# Patient Record
Sex: FEMALE | Race: WHITE | Employment: FULL TIME | URBAN - METROPOLITAN AREA
[De-identification: names, ages, dates, MRNs, and addresses within clinical notes are randomized per-mention and may not be internally consistent; named-entity substitution may affect disease eponyms.]

---

## 2017-01-12 DIAGNOSIS — R09.89 LABILE HYPERTENSION: Primary | ICD-10-CM

## 2017-01-12 DIAGNOSIS — E03.9 HYPOTHYROIDISM, UNSPECIFIED TYPE: ICD-10-CM

## 2017-01-12 RX ORDER — LISINOPRIL 10 MG/1
10 TABLET ORAL 2 TIMES DAILY
Qty: 180 TABLET | Refills: 0 | Status: SHIPPED | OUTPATIENT
Start: 2017-01-12 | End: 2017-01-13

## 2017-01-12 RX ORDER — LEVOTHYROXINE SODIUM 0.2 MG/1
200 TABLET ORAL
Qty: 90 TABLET | Refills: 0 | Status: SHIPPED | OUTPATIENT
Start: 2017-01-12 | End: 2017-01-13

## 2017-01-13 DIAGNOSIS — R09.89 LABILE HYPERTENSION: Primary | ICD-10-CM

## 2017-01-13 DIAGNOSIS — E03.9 HYPOTHYROIDISM, UNSPECIFIED TYPE: ICD-10-CM

## 2017-01-13 NOTE — TELEPHONE ENCOUNTER
Fax request received from [address] [place] [address] requesting refills on Lisinopril 10mg & Levothyroxine 200mcg. These were just filled 1-12-17 to [facility] in [place]. Phone call to [facility]. Patient has not picked up. Cancelled Rx's.   Last office visi

## 2017-01-16 RX ORDER — LISINOPRIL 10 MG/1
10 TABLET ORAL 2 TIMES DAILY
Qty: 180 TABLET | Refills: 0 | OUTPATIENT
Start: 2017-01-16 | End: 2017-12-02

## 2017-01-16 RX ORDER — LEVOTHYROXINE SODIUM 0.2 MG/1
200 TABLET ORAL
Qty: 90 TABLET | Refills: 0 | OUTPATIENT
Start: 2017-01-16 | End: 2017-12-02

## 2017-01-16 NOTE — TELEPHONE ENCOUNTER
Pharmacist calls inquiring about status of refills. Advised we have been trying to get ahold of patient. She is overdue for fasting labs. Advised can authorize #30 until labs are done.   States her insurance will not cover 30, only 90 because these are m

## 2017-01-19 ENCOUNTER — HOSPITAL ENCOUNTER (OUTPATIENT)
Age: 57
Discharge: HOME | End: 2017-01-19
Attending: FAMILY MEDICINE
Payer: COMMERCIAL

## 2017-01-19 ENCOUNTER — TELEPHONE (OUTPATIENT)
Dept: FAMILY MEDICINE CLINIC | Facility: CLINIC | Age: 57
End: 2017-01-19

## 2017-01-19 ENCOUNTER — APPOINTMENT (OUTPATIENT)
Dept: CT IMAGING | Age: 57
End: 2017-01-19
Attending: FAMILY MEDICINE
Payer: COMMERCIAL

## 2017-01-19 VITALS
DIASTOLIC BLOOD PRESSURE: 75 MMHG | WEIGHT: 239 LBS | HEART RATE: 78 BPM | SYSTOLIC BLOOD PRESSURE: 124 MMHG | BODY MASS INDEX: 36.22 KG/M2 | OXYGEN SATURATION: 95 % | HEIGHT: 68 IN | TEMPERATURE: 99 F | RESPIRATION RATE: 16 BRPM

## 2017-01-19 DIAGNOSIS — R10.9 ABDOMINAL PAIN, ACUTE: Primary | ICD-10-CM

## 2017-01-19 DIAGNOSIS — R19.7 DIARRHEA, UNSPECIFIED TYPE: Primary | ICD-10-CM

## 2017-01-19 DIAGNOSIS — R11.0 NAUSEA: ICD-10-CM

## 2017-01-19 DIAGNOSIS — R19.7 DIARRHEA, UNSPECIFIED TYPE: ICD-10-CM

## 2017-01-19 DIAGNOSIS — R50.9 FEVER, UNSPECIFIED FEVER CAUSE: ICD-10-CM

## 2017-01-19 LAB
#LYMPHOCYTE IC: 1.1 X10ˆ3/UL (ref 0.9–3.2)
#MXD IC: 0.7 X10ˆ3/UL (ref 0.1–1)
#NEUTROPHIL IC: 2 X10ˆ3/UL (ref 1.3–6.7)
HCT IC: 43.3 % (ref 37–54)
HGB IC: 14.5 G/DL (ref 11.7–16)
MCH IC: 28 PG (ref 27–33.2)
MCHC IC: 33.5 G/DL (ref 31–37)
MCV IC: 83.6 FL (ref 81–100)
PLT IC: 191 X10ˆ3/UL (ref 150–450)
POCT BILIRUBIN URINE: NEGATIVE
POCT BLOOD URINE: NEGATIVE
POCT GLUCOSE URINE: NEGATIVE MG/DL
POCT KETONE URINE: NEGATIVE MG/DL
POCT NITRITE URINE: NEGATIVE
POCT PH URINE: 5 (ref 5–8)
POCT PROTEIN URINE: NEGATIVE MG/DL
POCT SPECIFIC GRAVITY URINE: 1.03
POCT URINE CLARITY: CLEAR
POCT URINE COLOR: YELLOW
POCT UROBILINOGEN URINE: 0.2 MG/DL
RBC IC: 5.18 X10ˆ6/UL (ref 3.8–5.1)
WBC IC: 3.8 X10ˆ3/UL (ref 4–13)

## 2017-01-19 PROCEDURE — 87086 URINE CULTURE/COLONY COUNT: CPT | Performed by: FAMILY MEDICINE

## 2017-01-19 PROCEDURE — 96374 THER/PROPH/DIAG INJ IV PUSH: CPT

## 2017-01-19 PROCEDURE — 81002 URINALYSIS NONAUTO W/O SCOPE: CPT | Performed by: FAMILY MEDICINE

## 2017-01-19 PROCEDURE — 85025 COMPLETE CBC W/AUTO DIFF WBC: CPT | Performed by: FAMILY MEDICINE

## 2017-01-19 PROCEDURE — 74176 CT ABD & PELVIS W/O CONTRAST: CPT

## 2017-01-19 PROCEDURE — 99215 OFFICE O/P EST HI 40 MIN: CPT

## 2017-01-19 PROCEDURE — 96361 HYDRATE IV INFUSION ADD-ON: CPT

## 2017-01-19 PROCEDURE — 99204 OFFICE O/P NEW MOD 45 MIN: CPT

## 2017-01-19 RX ORDER — ONDANSETRON 8 MG/1
8 TABLET, ORALLY DISINTEGRATING ORAL EVERY 12 HOURS PRN
Qty: 10 TABLET | Refills: 0 | Status: SHIPPED | OUTPATIENT
Start: 2017-01-19 | End: 2017-01-29

## 2017-01-19 RX ORDER — METRONIDAZOLE 500 MG/1
500 TABLET ORAL 3 TIMES DAILY
Qty: 30 TABLET | Refills: 0 | Status: SHIPPED | OUTPATIENT
Start: 2017-01-19 | End: 2017-01-29

## 2017-01-19 RX ORDER — CIPROFLOXACIN 500 MG/1
500 TABLET, FILM COATED ORAL 2 TIMES DAILY
Qty: 20 TABLET | Refills: 0 | Status: SHIPPED | OUTPATIENT
Start: 2017-01-19 | End: 2017-01-29

## 2017-01-19 RX ORDER — SODIUM CHLORIDE 9 MG/ML
1000 INJECTION, SOLUTION INTRAVENOUS ONCE
Status: COMPLETED | OUTPATIENT
Start: 2017-01-19 | End: 2017-01-19

## 2017-01-19 RX ORDER — ONDANSETRON 2 MG/ML
4 INJECTION INTRAMUSCULAR; INTRAVENOUS ONCE
Status: COMPLETED | OUTPATIENT
Start: 2017-01-19 | End: 2017-01-19

## 2017-01-19 NOTE — ED INITIAL ASSESSMENT (HPI)
Since Sunday night fever 102, diarrhea, nausea . States not eating or drinking. Diarrhea has slowed down since she is not eating much. [facility] has been caring for her granddaughter who was diagnosed with cdiff after 4 months of antibiotics.  [facility] she has d

## 2017-01-19 NOTE — TELEPHONE ENCOUNTER
Has had diarrhea since Sunday, ran fever as high as 102 on Sun and Mon. This morning drenched in sweat, feels weak. Ate an egg and then had diarrhea again. Her grandaughter tested positive for c-diff, and she has been babysitting her.   She does feel she

## 2017-01-20 NOTE — ED PROVIDER NOTES
Patient Seen in: [address] [facility]    History   Patient presents with:  Diarrhea  Fever  Nausea    Stated Complaint: diarrhea/dehydration    HPI    58-year-old female who presents to the clinic today with chief complaints of fever, chills, ENDOSCOPY,DIAGNOSIS  1995    Comment minimla gastritis [address]    Comment wnl    OTHER SURGICAL HISTORY  10/2007    Comment C2-6 spine fusion w/ plate    UPPER GI ENDOSCOPY,DIAGNOSIS  5/27/08    Comment wnl    COLONOSCOPY,DIAG hours as needed for Wheezing.    Levothyroxine Sodium (SYNTHROID, LEVOTHROID) 25 MCG Oral Tab,  Take 1 tablet (25 mcg total) by mouth before breakfast.   PRILOSEC 40 MG OR CPDR,  1 CAPSULE DAILY       Family History   Problem Relation Age of Onset   • Hyper without murmur  GI: soft, non distended. No visible peristalsis. No masses. No organomegaly. Tenderness in left side of the abdomen, this infraumbilical/suprapubic area,. Umbilical area. Normoactive bowel sounds. No guarding or rigidity.   Normal to per AORTA/VASCULAR:  Normal.  No aneurysm. RETROPERITONEUM:  Normal.  No mass or adenopathy. BOWEL/MESENTERY:  Normal.  No visible mass, obstruction, or bowel wall thickening.  There is no free intraperitoneal air, free fluid or inflammatory changes of the pe intra-abdominal or pelvic process. However a small rounded encapsulated focus of fat necrosis was identified just superior to the bladder. Discussed the case with Dr. Miguel Winchester who is on-call for surgery.   She recommends that I also speak to urology to R-0    Ciprofloxacin HCl 500 MG Oral Tab  Take 1 tablet (500 mg total) by mouth 2 (two) times daily. , Normal, Disp-20 tablet, R-0    ondansetron 8 MG Oral Tablet Dispersible  Take 1 tablet (8 mg total) by mouth every 12 (twelve) hours as needed for Nausea.

## 2017-01-20 NOTE — ED NOTES
Discussed discharge instructions and need for hydration. Given stool packet. Will return stool tomorrow. States she feels better.

## 2017-01-21 ENCOUNTER — LAB ENCOUNTER (OUTPATIENT)
Dept: LAB | Age: 57
End: 2017-01-21
Attending: FAMILY MEDICINE
Payer: COMMERCIAL

## 2017-01-21 DIAGNOSIS — R19.7 DIARRHEA, UNSPECIFIED TYPE: ICD-10-CM

## 2017-01-21 DIAGNOSIS — R10.9 ABDOMINAL PAIN, UNSPECIFIED LOCATION: Primary | ICD-10-CM

## 2017-01-21 PROCEDURE — 87015 SPECIMEN INFECT AGNT CONCNTJ: CPT

## 2017-01-21 PROCEDURE — 89055 LEUKOCYTE ASSESSMENT FECAL: CPT

## 2017-01-21 PROCEDURE — 87045 FECES CULTURE AEROBIC BACT: CPT

## 2017-01-21 PROCEDURE — 87427 SHIGA-LIKE TOXIN AG IA: CPT

## 2017-01-21 PROCEDURE — 87077 CULTURE AEROBIC IDENTIFY: CPT

## 2017-01-21 PROCEDURE — 87493 C DIFF AMPLIFIED PROBE: CPT

## 2017-01-21 PROCEDURE — 87046 STOOL CULTR AEROBIC BACT EA: CPT

## 2017-01-25 ENCOUNTER — TELEPHONE (OUTPATIENT)
Dept: FAMILY MEDICINE CLINIC | Facility: CLINIC | Age: 57
End: 2017-01-25

## 2017-01-25 NOTE — TELEPHONE ENCOUNTER
Left detailed message advising patient of below including Dr. Vilma Allen phone number (from discharge summary).

## 2017-01-25 NOTE — TELEPHONE ENCOUNTER
Confused about her discharge instructions from immediate care. Was told to see surgeon, but wasn't sure if she could just follow up with Dr. Yan Calero. Advised will have Dr. Yan Calero review and will get back to her.

## 2017-02-02 ENCOUNTER — HOSPITAL ENCOUNTER (OUTPATIENT)
Dept: CT IMAGING | Age: 57
Discharge: HOME | End: 2017-02-02
Attending: SURGERY
Payer: COMMERCIAL

## 2017-02-02 DIAGNOSIS — R10.9 ABDOMINAL PAIN, UNSPECIFIED LOCATION: ICD-10-CM

## 2017-02-02 PROCEDURE — 74177 CT ABD & PELVIS W/CONTRAST: CPT

## 2017-02-03 NOTE — PROGRESS NOTES
Quick Note:    Sherice Roberts, have him make an appointment with me to go over the CT scan  I have never met the patient in person.  He was an outpatient ER consult  ______

## 2017-03-07 ENCOUNTER — OFFICE VISIT (OUTPATIENT)
Dept: FAMILY MEDICINE CLINIC | Facility: CLINIC | Age: 57
End: 2017-03-07

## 2017-03-07 ENCOUNTER — APPOINTMENT (OUTPATIENT)
Dept: LAB | Age: 57
End: 2017-03-07
Attending: FAMILY MEDICINE
Payer: COMMERCIAL

## 2017-03-07 VITALS
BODY MASS INDEX: 36.68 KG/M2 | DIASTOLIC BLOOD PRESSURE: 82 MMHG | HEIGHT: 68 IN | TEMPERATURE: 98 F | SYSTOLIC BLOOD PRESSURE: 132 MMHG | WEIGHT: 242 LBS | RESPIRATION RATE: 16 BRPM

## 2017-03-07 DIAGNOSIS — E03.9 HYPOTHYROIDISM, UNSPECIFIED TYPE: ICD-10-CM

## 2017-03-07 DIAGNOSIS — Z51.81 ENCOUNTER FOR THERAPEUTIC DRUG MONITORING: ICD-10-CM

## 2017-03-07 DIAGNOSIS — M67.879 MASS OF ACHILLES TENDON: ICD-10-CM

## 2017-03-07 DIAGNOSIS — E78.00 HYPERCHOLESTEREMIA: ICD-10-CM

## 2017-03-07 DIAGNOSIS — Z12.31 ENCOUNTER FOR SCREENING MAMMOGRAM FOR BREAST CANCER: ICD-10-CM

## 2017-03-07 DIAGNOSIS — Z51.81 ENCOUNTER FOR THERAPEUTIC DRUG MONITORING: Primary | ICD-10-CM

## 2017-03-07 DIAGNOSIS — N95.1 VASOMOTOR SYMPTOMS DUE TO MENOPAUSE: ICD-10-CM

## 2017-03-07 PROBLEM — K21.9 GERD (GASTROESOPHAGEAL REFLUX DISEASE): Status: ACTIVE | Noted: 2017-03-07

## 2017-03-07 LAB
ALBUMIN SERPL-MCNC: 3.9 G/DL (ref 3.5–4.8)
ALP LIVER SERPL-CCNC: 87 U/L (ref 46–118)
ALT SERPL-CCNC: 17 U/L (ref 14–54)
AST SERPL-CCNC: 16 U/L (ref 15–41)
BILIRUB SERPL-MCNC: 0.4 MG/DL (ref 0.1–2)
BUN BLD-MCNC: 11 MG/DL (ref 8–20)
CALCIUM BLD-MCNC: 8.8 MG/DL (ref 8.3–10.3)
CHLORIDE: 106 MMOL/L (ref 101–111)
CHOLEST SMN-MCNC: 201 MG/DL (ref ?–200)
CK: 143 IU/L (ref 26–192)
CO2: 25 MMOL/L (ref 22–32)
CREAT BLD-MCNC: 0.87 MG/DL (ref 0.55–1.02)
FREE T4: 1.1 NG/DL (ref 0.9–1.8)
GLUCOSE BLD-MCNC: 87 MG/DL (ref 70–99)
HDLC SERPL-MCNC: 49 MG/DL (ref 45–?)
HDLC SERPL: 4.1 {RATIO} (ref ?–4.44)
LDLC SERPL CALC-MCNC: 114 MG/DL (ref ?–130)
M PROTEIN MFR SERPL ELPH: 8 G/DL (ref 6.1–8.3)
NONHDLC SERPL-MCNC: 152 MG/DL (ref ?–130)
POTASSIUM SERPL-SCNC: 3.7 MMOL/L (ref 3.6–5.1)
SODIUM SERPL-SCNC: 140 MMOL/L (ref 136–144)
TRIGLYCERIDES: 190 MG/DL (ref ?–150)
TSI SER-ACNC: 1.79 MIU/ML (ref 0.35–5.5)
VLDL: 38 MG/DL (ref 5–40)

## 2017-03-07 PROCEDURE — 84443 ASSAY THYROID STIM HORMONE: CPT

## 2017-03-07 PROCEDURE — 99215 OFFICE O/P EST HI 40 MIN: CPT | Performed by: FAMILY MEDICINE

## 2017-03-07 PROCEDURE — 80053 COMPREHEN METABOLIC PANEL: CPT

## 2017-03-07 PROCEDURE — 82550 ASSAY OF CK (CPK): CPT

## 2017-03-07 PROCEDURE — 36415 COLL VENOUS BLD VENIPUNCTURE: CPT

## 2017-03-07 PROCEDURE — 80061 LIPID PANEL: CPT

## 2017-03-07 PROCEDURE — 84439 ASSAY OF FREE THYROXINE: CPT

## 2017-03-07 RX ORDER — CLONIDINE HYDROCHLORIDE 0.1 MG/1
0.1 TABLET ORAL 2 TIMES DAILY
Qty: 60 TABLET | Refills: 0 | Status: SHIPPED | OUTPATIENT
Start: 2017-03-07 | End: 2017-05-23

## 2017-03-07 NOTE — PROGRESS NOTES
Brian Barber is a 61year old female. HPI:   Here for thyroid test. taking synthroid 200 mcg daily. Want to have mass on achilles evaluated. Has bad night hot flashes. Struggles with weight loss. Rarely eats all day. Did stop drinking pop.   Denies eati Take 25 mcg by mouth before breakfast. Pt only taking 200mcg ) Disp: 90 tablet Rfl: 0      Past Medical History   Diagnosis Date   • Chronic pain syndrome 10/07     post C-spine surgery   • Restless legs syndrome (RLS)    • ASTHMA    • GERD    • HYPERTENSI with tender palpable mass    ASSESSMENT AND PLAN:   Encounter for therapeutic drug monitoring  (primary encounter diagnosis)  Mass of achilles tendon  Hypothyroidism, unspecified type  Encounter for screening mammogram for breast cancer  Hypercholesteremia

## 2017-03-07 NOTE — PATIENT INSTRUCTIONS
Hypothyroidism       You have hypothyroidism. This means your thyroid gland is not making enough thyroid hormone. This hormone is vital to body growth and metabolism. If you don’t make enough, many body processes slow down.  This can cause symptoms throug · Tell your provider if you have any side effects from your medicines that bother you.   · Never change the dosage or stop taking your thyroid pills without talking to your provider first.  General care  · Always talk with your provider before trying other · Urinary changes including incontinence and frequency  Postmenopause  After menopause, you make very little estrogen. As a result, the uterine lining does not thicken and your periods have ended.   Symptoms you may have:  · No periods  · Vaginal dryness  ·

## 2017-03-20 ENCOUNTER — TELEPHONE (OUTPATIENT)
Dept: FAMILY MEDICINE CLINIC | Facility: CLINIC | Age: 57
End: 2017-03-20

## 2017-03-20 NOTE — TELEPHONE ENCOUNTER
Future Appointments  Date Time Provider Jania Montoya   3/21/2017 2:00 PM Cary Espinoza, DO [facility] [place]   3/28/2017 9:00 AM Dorothy Tamez, DO [facility] [place]     Pt states that she typically gets prednisone called in for her.  Advised pt wilfredo

## 2017-03-21 ENCOUNTER — OFFICE VISIT (OUTPATIENT)
Dept: FAMILY MEDICINE CLINIC | Facility: CLINIC | Age: 57
End: 2017-03-21

## 2017-03-21 VITALS
RESPIRATION RATE: 16 BRPM | SYSTOLIC BLOOD PRESSURE: 134 MMHG | TEMPERATURE: 99 F | DIASTOLIC BLOOD PRESSURE: 84 MMHG | WEIGHT: 242 LBS | BODY MASS INDEX: 37 KG/M2

## 2017-03-21 DIAGNOSIS — E78.00 PURE HYPERCHOLESTEROLEMIA: Primary | ICD-10-CM

## 2017-03-21 DIAGNOSIS — J01.40 ACUTE NON-RECURRENT PANSINUSITIS: Primary | ICD-10-CM

## 2017-03-21 DIAGNOSIS — I10 ESSENTIAL HYPERTENSION WITH GOAL BLOOD PRESSURE LESS THAN 140/90: ICD-10-CM

## 2017-03-21 DIAGNOSIS — J45.20 REACTIVE AIRWAY DISEASE, MILD INTERMITTENT, UNCOMPLICATED: ICD-10-CM

## 2017-03-21 DIAGNOSIS — S86.012S ACHILLES TENDON TEAR, LEFT, SEQUELA: ICD-10-CM

## 2017-03-21 PROCEDURE — 99214 OFFICE O/P EST MOD 30 MIN: CPT | Performed by: FAMILY MEDICINE

## 2017-03-21 RX ORDER — CEFDINIR 300 MG/1
300 CAPSULE ORAL 2 TIMES DAILY
Qty: 20 CAPSULE | Refills: 0 | Status: SHIPPED | OUTPATIENT
Start: 2017-03-21 | End: 2017-03-31

## 2017-03-21 RX ORDER — ACETAMINOPHEN AND CODEINE PHOSPHATE 300; 30 MG/1; MG/1
TABLET ORAL
Refills: 1 | COMMUNITY
Start: 2017-03-09 | End: 2017-12-08 | Stop reason: ALTCHOICE

## 2017-03-21 NOTE — PROGRESS NOTES
HPI:   Maggi Joseph is a 61year old female who presents for upper respiratory symptoms for  7  days.  Patient reports congestion, fever with Tmax to 100, cough is keeping pt up at night, sinus pain, wheezing doing albuterol nebs and prednisone 40 mg helpe Take 1 tablet (25 mcg total) by mouth before breakfast. (Patient taking differently: Take 25 mcg by mouth before breakfast. Pt only taking 200mcg ) Disp: 90 tablet Rfl: 0   Acetaminophen-Codeine #3 300-30 MG Oral Tab TK ONE T PO Q 4 TO 6 H PRN P Disp:  Rfl Hypertension Mother    • Other Mother      S/P krys   • Cancer Maternal Grandmother      colon cancer   • Cancer Sister      rectal cancer   • Other Sister      GERD        Smoking Status: Never Smoker                      Smokeless Status: Never Used worsen.

## 2017-03-24 ENCOUNTER — TELEPHONE (OUTPATIENT)
Dept: FAMILY MEDICINE CLINIC | Facility: CLINIC | Age: 57
End: 2017-03-24

## 2017-03-27 NOTE — PROGRESS NOTES
HPI:   Gareth Freeman is a 61year old female who presents for upper respiratory symptoms - seens 3/21/2017 for sinusitis. Treated with predniosne 20 mg bid x 5 days, duoneb, and albuterol. Here for follow up. Has asthma and has flared up .  Did get treated (2.5 mg total) by nebulization every 4 (four) hours as needed for Wheezing.  Disp: 50 ampule Rfl: 3   Levothyroxine Sodium (SYNTHROID, LEVOTHROID) 25 MCG Oral Tab Take 1 tablet (25 mcg total) by mouth before breakfast. (Patient taking differently: Take 25 m (CORONARY)        Family History   Problem Relation Age of Onset   • Hypertension Mother    • Other Mother      S/P krys   • Cancer Maternal Grandmother      colon cancer   • Cancer Sister      rectal cancer   • Other Sister      GERD        Smoking Statu bid x 10 days  Continue singulair 10 mg  Phenergan with codeine 5 ml every 4-6 hour      The patient indicates understanding of these issues and agrees to the plan. The patient is asked to return if sx's persist or worsen.

## 2017-03-28 ENCOUNTER — OFFICE VISIT (OUTPATIENT)
Dept: FAMILY MEDICINE CLINIC | Facility: CLINIC | Age: 57
End: 2017-03-28

## 2017-03-28 VITALS
TEMPERATURE: 98 F | SYSTOLIC BLOOD PRESSURE: 114 MMHG | OXYGEN SATURATION: 98 % | DIASTOLIC BLOOD PRESSURE: 72 MMHG | HEART RATE: 84 BPM

## 2017-03-28 DIAGNOSIS — J01.40 ACUTE NON-RECURRENT PANSINUSITIS: ICD-10-CM

## 2017-03-28 DIAGNOSIS — J45.41 ASTHMATIC BRONCHITIS, MODERATE PERSISTENT, WITH ACUTE EXACERBATION: Primary | ICD-10-CM

## 2017-03-28 PROCEDURE — 99213 OFFICE O/P EST LOW 20 MIN: CPT | Performed by: FAMILY MEDICINE

## 2017-03-28 RX ORDER — AMOXICILLIN AND CLAVULANATE POTASSIUM 875; 125 MG/1; MG/1
1 TABLET, FILM COATED ORAL 2 TIMES DAILY
Qty: 20 TABLET | Refills: 0 | Status: SHIPPED | OUTPATIENT
Start: 2017-03-28 | End: 2017-05-23

## 2017-03-28 RX ORDER — PROMETHAZINE HYDROCHLORIDE AND CODEINE PHOSPHATE 6.25; 1 MG/5ML; MG/5ML
2.5 SYRUP ORAL EVERY 4 HOURS PRN
Qty: 118 ML | Refills: 0 | Status: SHIPPED | OUTPATIENT
Start: 2017-03-28 | End: 2018-01-17 | Stop reason: ALTCHOICE

## 2017-03-28 RX ORDER — PREDNISONE 20 MG/1
TABLET ORAL
Qty: 21 TABLET | Refills: 0 | Status: SHIPPED | OUTPATIENT
Start: 2017-03-28 | End: 2017-06-09

## 2017-03-28 NOTE — PATIENT INSTRUCTIONS
Prednisone taper  duoneb q6  Albuterol q 3-4 hours prn   Saline nares  Finish augmentin 875 mg bid x 10 days  Continue singulair 10 mg  Phenergan with codeine 5 ml every 4-6 hour

## 2017-04-07 RX ORDER — OMEPRAZOLE 40 MG/1
40 CAPSULE, DELAYED RELEASE ORAL DAILY
Qty: 90 CAPSULE | Refills: 0 | Status: SHIPPED | OUTPATIENT
Start: 2017-04-07 | End: 2017-11-25

## 2017-04-07 RX ORDER — SIMVASTATIN 20 MG
20 TABLET ORAL NIGHTLY
Qty: 90 TABLET | Refills: 1 | Status: SHIPPED | OUTPATIENT
Start: 2017-04-07 | End: 2018-07-11

## 2017-05-16 ENCOUNTER — TELEPHONE (OUTPATIENT)
Dept: FAMILY MEDICINE CLINIC | Facility: CLINIC | Age: 57
End: 2017-05-16

## 2017-05-16 RX ORDER — AZITHROMYCIN 250 MG/1
TABLET, FILM COATED ORAL
Qty: 6 TABLET | Refills: 0 | Status: SHIPPED | OUTPATIENT
Start: 2017-05-16 | End: 2017-06-09

## 2017-05-16 RX ORDER — BENZONATATE 200 MG/1
200 CAPSULE ORAL 3 TIMES DAILY PRN
Qty: 20 CAPSULE | Refills: 0 | Status: SHIPPED | OUTPATIENT
Start: 2017-05-16 | End: 2017-12-08 | Stop reason: ALTCHOICE

## 2017-05-16 NOTE — TELEPHONE ENCOUNTER
Patient states that she continues to have issues with asthma. Wheezing, green/yellow mucous. She is using inhalers (Qvar, and advair), singulair with no relief. Patient is asking for permission to start prednisone, she has at home.   She is also requesti

## 2017-05-19 ENCOUNTER — TELEPHONE (OUTPATIENT)
Dept: FAMILY MEDICINE CLINIC | Facility: CLINIC | Age: 57
End: 2017-05-19

## 2017-05-23 ENCOUNTER — OFFICE VISIT (OUTPATIENT)
Dept: FAMILY MEDICINE CLINIC | Facility: CLINIC | Age: 57
End: 2017-05-23

## 2017-05-23 VITALS
SYSTOLIC BLOOD PRESSURE: 136 MMHG | DIASTOLIC BLOOD PRESSURE: 88 MMHG | OXYGEN SATURATION: 98 % | HEART RATE: 86 BPM | TEMPERATURE: 98 F

## 2017-05-23 DIAGNOSIS — M76.62 TENDONITIS, ACHILLES, LEFT: ICD-10-CM

## 2017-05-23 DIAGNOSIS — J01.01 ACUTE RECURRENT MAXILLARY SINUSITIS: ICD-10-CM

## 2017-05-23 DIAGNOSIS — D22.72 MELANOCYTIC NEVUS OF LEFT LOWER EXTREMITY: Primary | ICD-10-CM

## 2017-05-23 DIAGNOSIS — K66.8 MESENTERIC CYST: ICD-10-CM

## 2017-05-23 DIAGNOSIS — J45.41 ASTHMATIC BRONCHITIS, MODERATE PERSISTENT, WITH ACUTE EXACERBATION: ICD-10-CM

## 2017-05-23 PROCEDURE — 99214 OFFICE O/P EST MOD 30 MIN: CPT | Performed by: FAMILY MEDICINE

## 2017-05-23 NOTE — PROGRESS NOTES
HPI:   Andrez Jimenes is a 61year old female who presents for upper respiratory symptoms for  2  weeks.  Patient reports congestion, cough is keeping pt up at night, sinus pain, wheezing, prior history of bronchitis, prior history of sinusitis started zpack MG Oral Tab Take 1 tablet (10 mg total) by mouth 3 (three) times daily. Disp: 90 tablet Rfl: 0   ipratropium-albuterol (DUONEB) 0.5-2.5 (3) MG/3ML Inhalation Solution Take 3 mL by nebulization 4 (four) times daily as needed.  Disp: 120 mL Rfl: 0   Monteluka gastritis TARAN -    COLONOSCOPY,DIAGNOSTIC  1999    Comment wnl    OTHER SURGICAL HISTORY  10/2007    Comment C2-6 spine fusion w/ plate    UPPER GI ENDOSCOPY,DIAGNOSIS  5/27/08    Comment wnl    COLONOSCOPY,DIAGNOSTIC  5/27/08    Comment wnl    CHOLECYSTEC with Dr. Chantelle Mondragon for surgery 6/21/2017    3. Mesenteric cyst  - call urology to get follow up MRI to assess cyst on bladder    4.  Asthmatic bronchitis, moderate persistent, with acute exacerbation  - prednisone 20 mg bid  - symbicort 1 puff bid  - duoneb

## 2017-05-23 NOTE — PATIENT INSTRUCTIONS
ASSESSMENT AND PLAN:   Ha Davies is a 61year old female who presents with     1. Melanocytic nevus of left lower extremity    - Referral to Dermatology- [facility])    2.  Tendonitis, Achilles, left  - follow up with Dr. Shaheen Bragg

## 2017-06-01 ENCOUNTER — TELEPHONE (OUTPATIENT)
Dept: FAMILY MEDICINE CLINIC | Facility: CLINIC | Age: 57
End: 2017-06-01

## 2017-06-01 NOTE — TELEPHONE ENCOUNTER
Patient returned phone call and states that she felt better for about 3 days after finishing antibiotics. Patient was seen in office on 5/23/17.   Runny nose started again yesterday, last night patient had shortness of breath states she had to sleep sitting

## 2017-06-02 RX ORDER — AZITHROMYCIN 250 MG/1
TABLET, FILM COATED ORAL
Qty: 6 TABLET | Refills: 0 | Status: SHIPPED | OUTPATIENT
Start: 2017-06-02 | End: 2017-06-10 | Stop reason: ALTCHOICE

## 2017-06-02 RX ORDER — PREDNISONE 20 MG/1
20 TABLET ORAL 2 TIMES DAILY
Qty: 10 TABLET | Refills: 0 | Status: SHIPPED | OUTPATIENT
Start: 2017-06-02 | End: 2017-06-07

## 2017-06-02 NOTE — TELEPHONE ENCOUNTER
Patient notified that per wilfredo Alvarez to send in prescription for zpak and prednisone 20mg BID x 5 days. If not better, patient should be seen. Patient verbalized understanding.

## 2017-06-08 ENCOUNTER — TELEPHONE (OUTPATIENT)
Dept: FAMILY MEDICINE CLINIC | Facility: CLINIC | Age: 57
End: 2017-06-08

## 2017-06-08 NOTE — TELEPHONE ENCOUNTER
C/o cold symptoms cough xs 4 weeks nasal fullness,chest congestion, has been going on for 4 weeks , could not lay down to have MRI head fullness , could not breathe, felt claustrophobic they recommended the open  MRI, there is one in [place] , MRI order

## 2017-06-10 ENCOUNTER — HOSPITAL ENCOUNTER (OUTPATIENT)
Dept: GENERAL RADIOLOGY | Age: 57
Discharge: HOME | End: 2017-06-10
Attending: FAMILY MEDICINE
Payer: COMMERCIAL

## 2017-06-10 ENCOUNTER — OFFICE VISIT (OUTPATIENT)
Dept: FAMILY MEDICINE CLINIC | Facility: CLINIC | Age: 57
End: 2017-06-10

## 2017-06-10 VITALS
HEART RATE: 80 BPM | OXYGEN SATURATION: 98 % | DIASTOLIC BLOOD PRESSURE: 80 MMHG | SYSTOLIC BLOOD PRESSURE: 130 MMHG | TEMPERATURE: 98 F | WEIGHT: 236 LBS | BODY MASS INDEX: 36 KG/M2

## 2017-06-10 DIAGNOSIS — J45.41 ASTHMATIC BRONCHITIS, MODERATE PERSISTENT, WITH ACUTE EXACERBATION: ICD-10-CM

## 2017-06-10 DIAGNOSIS — J01.01 ACUTE RECURRENT MAXILLARY SINUSITIS: ICD-10-CM

## 2017-06-10 DIAGNOSIS — I10 ESSENTIAL HYPERTENSION WITH GOAL BLOOD PRESSURE LESS THAN 140/90: ICD-10-CM

## 2017-06-10 DIAGNOSIS — J45.41 ASTHMATIC BRONCHITIS, MODERATE PERSISTENT, WITH ACUTE EXACERBATION: Primary | ICD-10-CM

## 2017-06-10 PROCEDURE — 71020 XR CHEST PA + LAT CHEST (CPT=71020): CPT | Performed by: FAMILY MEDICINE

## 2017-06-10 PROCEDURE — 99214 OFFICE O/P EST MOD 30 MIN: CPT | Performed by: FAMILY MEDICINE

## 2017-06-10 RX ORDER — CLARITHROMYCIN 500 MG/1
500 TABLET, COATED ORAL 2 TIMES DAILY
Qty: 20 TABLET | Refills: 0 | Status: SHIPPED | OUTPATIENT
Start: 2017-06-10 | End: 2017-06-16

## 2017-06-10 RX ORDER — PREDNISONE 20 MG/1
TABLET ORAL
Qty: 21 TABLET | Refills: 0 | Status: SHIPPED | OUTPATIENT
Start: 2017-06-10 | End: 2017-12-08 | Stop reason: ALTCHOICE

## 2017-06-10 RX ORDER — BENZONATATE 100 MG/1
100 CAPSULE ORAL 3 TIMES DAILY PRN
Qty: 30 CAPSULE | Refills: 0 | Status: SHIPPED | OUTPATIENT
Start: 2017-06-10 | End: 2017-12-08 | Stop reason: ALTCHOICE

## 2017-06-10 NOTE — PATIENT INSTRUCTIONS
Prednisone 60 mg x 3, 40 mg x 3  30 mg x 3, 20 mg x 3  breo sample given once a day  Finish biaxin  Hold simvastatin.  - can cause cardiac arrythmia with biaxin  duoneb q 6  Albuterol q3 prn.  singulair 10 mg  Follow up with allergist.    New nebulizer give

## 2017-06-10 NOTE — PROGRESS NOTES
HPI:   Faby Alejandro is a 61year old female who presents for upper respiratory symptoms for  4  weeks.  Patient reports congestion, cough is keeping pt up at night, wheezing, prior history of bronchitis, prior history of pneumonia, prior history of sinusit tablet (25 mcg total) by mouth before breakfast. (Patient taking differently: Take 25 mcg by mouth before breakfast. Pt only taking 200mcg ) Disp: 90 tablet Rfl: 0   PRILOSEC 40 MG OR CPDR 1 CAPSULE DAILY Disp:  Rfl:    azithromycin (Abram Uribe) 250 M LAMINECTOMY,CERVICAL  2007/2008    Comment x 2 ( and a Redo with Dr.Doug Vidhya Avery in 2008)    Damari Freeman 994 w/ RSO for DUB    ANGIOPLASTY (CORONARY)        Family History   Problem Relation Age of Onset   • Hypertension Mother    • day  Finish biaxin  Hold simvastatin. - can cause cardiac arrythmia with biaxin  duoneb q 6  Albuterol q3 prn.  singulair 10 mg  Follow up with allergist.    New nebulizer given    The patient indicates understanding of these issues and agrees to the plan.

## 2017-06-14 ENCOUNTER — TELEPHONE (OUTPATIENT)
Dept: FAMILY MEDICINE CLINIC | Facility: CLINIC | Age: 57
End: 2017-06-14

## 2017-06-14 RX ORDER — ALPRAZOLAM 0.5 MG/1
TABLET ORAL
Qty: 5 TABLET | Refills: 0 | Status: SHIPPED | OUTPATIENT
Start: 2017-06-14 | End: 2017-12-08 | Stop reason: ALTCHOICE

## 2017-06-14 NOTE — PROGRESS NOTES
Magaly Hernandez is a 61year old female who presents for a pre-operative physical exam. Patient is to have achilles tendon repair, to be done by Dr. Rick Canchola at [facility] on 6/21/2017.       HPI:   Pt complains of needing pre op physical for achilles tendon repa nebulization 4 (four) times daily as needed. Disp: 120 mL Rfl: 0   Montelukast Sodium (SINGULAIR) 10 MG Oral Tab Take 1 tablet (10 mg total) by mouth every evening.  Disp: 180 tablet Rfl: 3   fluticasone-salmeterol 250-50 MCG/DOSE Inhalation Aerosol Powder, ENDOSCOPY,DIAGNOSIS  5/27/08    Comment wnl    COLONOSCOPY,DIAGNOSTIC  5/27/08    Comment wnl    CHOLECYSTECTOMY      TMJ RECONSTRUCTION      Comment Mandibuler reconstruction     LAMINECTOMY,CERVICAL  2007/2008    Comment x 2 ( and a Redo with D RRR without murmur  GI: good BS's,no masses, HSM or tenderness  : deferred  RECTAL: deferred  MUSCULOSKELETAL: back is not tender,FROM of the back  EXTREMITIES: no cyanosis, clubbing or edema, boot on left foot  NEURO: Oriented times three,cranial nerves

## 2017-06-15 ENCOUNTER — TELEPHONE (OUTPATIENT)
Dept: FAMILY MEDICINE CLINIC | Facility: CLINIC | Age: 57
End: 2017-06-15

## 2017-06-16 ENCOUNTER — LAB ENCOUNTER (OUTPATIENT)
Dept: LAB | Age: 57
End: 2017-06-16
Attending: FAMILY MEDICINE
Payer: COMMERCIAL

## 2017-06-16 ENCOUNTER — OFFICE VISIT (OUTPATIENT)
Dept: FAMILY MEDICINE CLINIC | Facility: CLINIC | Age: 57
End: 2017-06-16

## 2017-06-16 VITALS
HEART RATE: 80 BPM | HEIGHT: 68 IN | OXYGEN SATURATION: 98 % | BODY MASS INDEX: 35.77 KG/M2 | WEIGHT: 236 LBS | TEMPERATURE: 98 F | DIASTOLIC BLOOD PRESSURE: 86 MMHG | SYSTOLIC BLOOD PRESSURE: 144 MMHG

## 2017-06-16 DIAGNOSIS — S86.012S ACHILLES TENDON TEAR, LEFT, SEQUELA: ICD-10-CM

## 2017-06-16 DIAGNOSIS — R09.89 LABILE HYPERTENSION: ICD-10-CM

## 2017-06-16 DIAGNOSIS — Z01.818 PREOPERATIVE CLEARANCE: ICD-10-CM

## 2017-06-16 DIAGNOSIS — J45.20 REACTIVE AIRWAY DISEASE, MILD INTERMITTENT, UNCOMPLICATED: ICD-10-CM

## 2017-06-16 DIAGNOSIS — Z01.818 PREOPERATIVE CLEARANCE: Primary | ICD-10-CM

## 2017-06-16 DIAGNOSIS — E78.00 HYPERCHOLESTEREMIA: ICD-10-CM

## 2017-06-16 PROCEDURE — 99214 OFFICE O/P EST MOD 30 MIN: CPT | Performed by: FAMILY MEDICINE

## 2017-06-16 PROCEDURE — 36415 COLL VENOUS BLD VENIPUNCTURE: CPT | Performed by: FAMILY MEDICINE

## 2017-06-16 PROCEDURE — 93000 ELECTROCARDIOGRAM COMPLETE: CPT | Performed by: FAMILY MEDICINE

## 2017-06-16 PROCEDURE — 85025 COMPLETE CBC W/AUTO DIFF WBC: CPT | Performed by: FAMILY MEDICINE

## 2017-06-16 NOTE — PROGRESS NOTES
PRE-OP Physical   What testing is needed for this surgery/patient? CBC and EKG    What is the full name of procedure/ surgery? 1. Repair of Achilles Tendon with graft, left. 2. excision of soft tissue mass, left. 3. gastroc recession, left.      Date being

## 2017-06-16 NOTE — PATIENT INSTRUCTIONS
Understanding Achilles Tendon Repair Surgery  The Achilles tendon is a strong, fibrous cord in the back of your lower leg. It connects the muscles of your calf to your heel. It’s the largest tendon in your body. It helps you walk, run, and jump.  Achilles Risks depend on factors such as your age, your overall health, and the type of surgery. They also depend on the shape of your foot, muscles, and tendons. Ask your healthcare provider which risks apply most to you.  Talk with him or her about any concerns yo

## 2017-06-20 ENCOUNTER — TELEPHONE (OUTPATIENT)
Dept: FAMILY MEDICINE CLINIC | Facility: CLINIC | Age: 57
End: 2017-06-20

## 2017-08-15 DIAGNOSIS — J45.21 EXACERBATION OF INTERMITTENT ASTHMA: ICD-10-CM

## 2017-08-15 RX ORDER — ALBUTEROL SULFATE 2.5 MG/3ML
SOLUTION RESPIRATORY (INHALATION)
Qty: 150 ML | Refills: 0 | Status: SHIPPED | OUTPATIENT
Start: 2017-08-15 | End: 2017-12-08 | Stop reason: ALTCHOICE

## 2017-10-26 ENCOUNTER — TELEPHONE (OUTPATIENT)
Dept: FAMILY MEDICINE CLINIC | Facility: CLINIC | Age: 57
End: 2017-10-26

## 2017-10-26 NOTE — TELEPHONE ENCOUNTER
She said Dr Manjinder Spangler needs to sign off on EKG she had in June that it was normal. Copy to Dr Manjinder Spangler' desk.

## 2017-10-26 NOTE — TELEPHONE ENCOUNTER
DR FILLED OUT FORMS FOR RESPIRATOR SINCE PT SOMETIMES HAS TO WEAR A N91 MASK AT WORK, THEY WILL NOT LET HER WEAR MASK UNTIL THEY HAVE COPY OF HER STRESS TEST FROM JUNE & A NOTE THAT SHE IS CLEARED TO WEAR MASK, FAX TO Tino @ [phone]

## 2017-11-14 ENCOUNTER — LABORATORY ENCOUNTER (OUTPATIENT)
Dept: LAB | Age: 57
End: 2017-11-14
Attending: ORTHOPAEDIC SURGERY
Payer: COMMERCIAL

## 2017-11-14 DIAGNOSIS — M25.572 LEFT ANKLE PAIN, UNSPECIFIED CHRONICITY: ICD-10-CM

## 2017-11-14 DIAGNOSIS — S86.012A PARTIAL TEAR OF LEFT ACHILLES TENDON, INITIAL ENCOUNTER: ICD-10-CM

## 2017-11-14 DIAGNOSIS — M21.862 GASTROCNEMIUS EQUINUS OF LEFT LOWER EXTREMITY: ICD-10-CM

## 2017-11-14 DIAGNOSIS — E78.00 PURE HYPERCHOLESTEROLEMIA: ICD-10-CM

## 2017-11-14 PROCEDURE — 82550 ASSAY OF CK (CPK): CPT | Performed by: FAMILY MEDICINE

## 2017-11-14 PROCEDURE — 36415 COLL VENOUS BLD VENIPUNCTURE: CPT | Performed by: FAMILY MEDICINE

## 2017-11-14 PROCEDURE — 84450 TRANSFERASE (AST) (SGOT): CPT | Performed by: FAMILY MEDICINE

## 2017-11-14 PROCEDURE — 80061 LIPID PANEL: CPT | Performed by: FAMILY MEDICINE

## 2017-11-15 DIAGNOSIS — E03.9 HYPOTHYROIDISM, UNSPECIFIED TYPE: Primary | ICD-10-CM

## 2017-11-15 DIAGNOSIS — E78.00 PURE HYPERCHOLESTEROLEMIA: ICD-10-CM

## 2017-11-15 DIAGNOSIS — I10 ESSENTIAL HYPERTENSION: ICD-10-CM

## 2017-11-27 RX ORDER — OMEPRAZOLE 40 MG/1
40 CAPSULE, DELAYED RELEASE ORAL DAILY
Qty: 90 CAPSULE | Refills: 0 | Status: SHIPPED | OUTPATIENT
Start: 2017-11-27 | End: 2017-12-08

## 2017-11-29 ENCOUNTER — TELEPHONE (OUTPATIENT)
Dept: FAMILY MEDICINE CLINIC | Facility: CLINIC | Age: 57
End: 2017-11-29

## 2017-11-29 NOTE — TELEPHONE ENCOUNTER
Patient advised of below and verbalizes understanding.   Future Appointments  Date Time Provider Jania Montoya   11/30/2017 10:00 AM [facility] [place]

## 2017-11-29 NOTE — TELEPHONE ENCOUNTER
Symptoms x a couple days. Back discomfort, burning with urination. Back is really tender. Urine is not dark, but is cloudy. Nauseous. Has had one in the past, but it's been awhile. Just doesn't feel good. Allergies to Sulfa and Biaxin.   Lisandro Sails

## 2017-11-29 NOTE — TELEPHONE ENCOUNTER
Yes needs sample before starting antibiotic. If can get one tonight and place in frig then ok to bring in am. We can send over macrobid 100 mg bid x 10 days.

## 2017-11-30 ENCOUNTER — NURSE ONLY (OUTPATIENT)
Dept: FAMILY MEDICINE CLINIC | Facility: CLINIC | Age: 57
End: 2017-11-30

## 2017-11-30 DIAGNOSIS — M54.9 BACK PAIN, UNSPECIFIED BACK LOCATION, UNSPECIFIED BACK PAIN LATERALITY, UNSPECIFIED CHRONICITY: Primary | ICD-10-CM

## 2017-11-30 DIAGNOSIS — R30.0 BURNING WITH URINATION: ICD-10-CM

## 2017-11-30 DIAGNOSIS — R82.90 CLOUDY URINE: ICD-10-CM

## 2017-11-30 DIAGNOSIS — R11.0 NAUSEA: ICD-10-CM

## 2017-11-30 PROCEDURE — 87086 URINE CULTURE/COLONY COUNT: CPT | Performed by: FAMILY MEDICINE

## 2017-11-30 PROCEDURE — 81003 URINALYSIS AUTO W/O SCOPE: CPT | Performed by: FAMILY MEDICINE

## 2017-11-30 RX ORDER — NITROFURANTOIN 25; 75 MG/1; MG/1
100 CAPSULE ORAL 2 TIMES DAILY
Qty: 20 CAPSULE | Refills: 0 | Status: SHIPPED | OUTPATIENT
Start: 2017-11-30 | End: 2018-01-17 | Stop reason: ALTCHOICE

## 2017-11-30 NOTE — PROGRESS NOTES
Patient dropped off urine specimen, urine dip and culture completed. Macrobid sent to pharmacy per DS.

## 2017-12-02 DIAGNOSIS — R09.89 LABILE HYPERTENSION: ICD-10-CM

## 2017-12-02 DIAGNOSIS — E03.9 HYPOTHYROIDISM, UNSPECIFIED TYPE: ICD-10-CM

## 2017-12-02 RX ORDER — LISINOPRIL 10 MG/1
10 TABLET ORAL 2 TIMES DAILY
Qty: 180 TABLET | Refills: 0 | Status: SHIPPED | OUTPATIENT
Start: 2017-12-02 | End: 2018-10-23

## 2017-12-02 RX ORDER — LEVOTHYROXINE SODIUM 0.2 MG/1
200 TABLET ORAL
Qty: 90 TABLET | Refills: 0 | Status: SHIPPED | OUTPATIENT
Start: 2017-12-02 | End: 2018-08-21

## 2017-12-07 NOTE — PROGRESS NOTES
Derrick Knight is a 61year old female. HPI:   June 22 had ankle surgery. 5 days later felt had right ankle pain medial to foot. Contacted Dr. Roxy Estrada. Foot swollen achy on off since surgery. States cut off bandage , leg swollen and red.  Went to mere [facility] evening. Disp: 180 tablet Rfl: 3   PRILOSEC 40 MG OR CPDR 1 CAPSULE DAILY Disp:  Rfl:    promethazine-codeine 6.25-10 MG/5ML Oral Syrup Take 2.5 mL by mouth every 4 (four) hours as needed for cough.  Disp: 118 mL Rfl: 0   fluticasone-salmeterol 250-50 MCG/D vasculitis both shins. Back of left mid lower leg with scar and kadie erythema 1 inch in periphery of inicison site.  Lower heal incision well healed without erythema  HEENT: ears and throat are clear  NECK: supple,no adenopathy,  LUNGS: clear to auscultati

## 2017-12-08 ENCOUNTER — OFFICE VISIT (OUTPATIENT)
Dept: FAMILY MEDICINE CLINIC | Facility: CLINIC | Age: 57
End: 2017-12-08

## 2017-12-08 VITALS
DIASTOLIC BLOOD PRESSURE: 88 MMHG | SYSTOLIC BLOOD PRESSURE: 138 MMHG | WEIGHT: 249 LBS | BODY MASS INDEX: 38 KG/M2 | TEMPERATURE: 98 F

## 2017-12-08 DIAGNOSIS — G89.29 CHRONIC PAIN OF LEFT LOWER EXTREMITY: Primary | ICD-10-CM

## 2017-12-08 DIAGNOSIS — M79.605 CHRONIC PAIN OF LEFT LOWER EXTREMITY: Primary | ICD-10-CM

## 2017-12-08 DIAGNOSIS — N95.1 HOT FLASHES DUE TO MENOPAUSE: ICD-10-CM

## 2017-12-08 PROCEDURE — 99214 OFFICE O/P EST MOD 30 MIN: CPT | Performed by: FAMILY MEDICINE

## 2017-12-08 RX ORDER — DULOXETIN HYDROCHLORIDE 20 MG/1
20 CAPSULE, DELAYED RELEASE ORAL DAILY
Qty: 30 CAPSULE | Refills: 0 | Status: SHIPPED | OUTPATIENT
Start: 2017-12-08 | End: 2018-02-01

## 2017-12-08 NOTE — PATIENT INSTRUCTIONS
Leg Swelling in a Single Leg  Swelling of the arms, feet, ankles, and legs is called edema. It is caused by extra fluid collecting in the tissues. Because of gravity, extra fluid in the body settles to the lowest part.  That is why the legs and feet are m · Weakness or dizziness  · Shaking chills  · Drenching sweats  Date Last Reviewed: 4/11/2016  © 8385-7494 [employer]. [address] [facility], [address] [place] [address]. All rights reserved.  This information is not intended as a substitute for vernon

## 2018-01-17 ENCOUNTER — OFFICE VISIT (OUTPATIENT)
Dept: FAMILY MEDICINE CLINIC | Facility: CLINIC | Age: 58
End: 2018-01-17

## 2018-01-17 VITALS — TEMPERATURE: 98 F | DIASTOLIC BLOOD PRESSURE: 88 MMHG | SYSTOLIC BLOOD PRESSURE: 132 MMHG

## 2018-01-17 DIAGNOSIS — I10 ESSENTIAL HYPERTENSION: ICD-10-CM

## 2018-01-17 DIAGNOSIS — K13.0 LIP LESION: Primary | ICD-10-CM

## 2018-01-17 DIAGNOSIS — Z12.31 VISIT FOR SCREENING MAMMOGRAM: ICD-10-CM

## 2018-01-17 PROCEDURE — 99214 OFFICE O/P EST MOD 30 MIN: CPT | Performed by: FAMILY MEDICINE
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
## 2018-02-01 DIAGNOSIS — M79.605 CHRONIC PAIN OF LEFT LOWER EXTREMITY: ICD-10-CM

## 2018-02-01 DIAGNOSIS — G89.29 CHRONIC PAIN OF LEFT LOWER EXTREMITY: ICD-10-CM

## 2018-02-01 DIAGNOSIS — N95.1 HOT FLASHES DUE TO MENOPAUSE: ICD-10-CM

## 2018-02-01 RX ORDER — DULOXETIN HYDROCHLORIDE 20 MG/1
20 CAPSULE, DELAYED RELEASE ORAL DAILY
Qty: 30 CAPSULE | Refills: 0 | Status: SHIPPED | OUTPATIENT
Start: 2018-02-01 | End: 2018-07-11 | Stop reason: ALTCHOICE

## 2018-02-01 NOTE — TELEPHONE ENCOUNTER
LOV- 1/17/2018  Last refill-   DULoxetine HCl 20 MG Oral Cap DR Particles 30 capsule 0 12/8/2017   Medication pending in encounter. Received fax from [employer]

## 2018-02-08 ENCOUNTER — OFFICE VISIT (OUTPATIENT)
Dept: FAMILY MEDICINE CLINIC | Facility: CLINIC | Age: 58
End: 2018-02-08

## 2018-02-08 VITALS
WEIGHT: 249 LBS | OXYGEN SATURATION: 98 % | BODY MASS INDEX: 38 KG/M2 | TEMPERATURE: 98 F | DIASTOLIC BLOOD PRESSURE: 80 MMHG | HEART RATE: 72 BPM | SYSTOLIC BLOOD PRESSURE: 138 MMHG

## 2018-02-08 DIAGNOSIS — J45.20 MILD INTERMITTENT ASTHMA WITHOUT COMPLICATION: ICD-10-CM

## 2018-02-08 DIAGNOSIS — J02.9 PHARYNGITIS, UNSPECIFIED ETIOLOGY: Primary | ICD-10-CM

## 2018-02-08 PROCEDURE — 99214 OFFICE O/P EST MOD 30 MIN: CPT | Performed by: FAMILY MEDICINE

## 2018-02-08 RX ORDER — PREDNISONE 20 MG/1
20 TABLET ORAL 2 TIMES DAILY
Qty: 10 TABLET | Refills: 0 | Status: SHIPPED | OUTPATIENT
Start: 2018-02-08 | End: 2018-02-15

## 2018-02-08 RX ORDER — AZITHROMYCIN 250 MG/1
TABLET, FILM COATED ORAL
Qty: 6 TABLET | Refills: 0 | Status: SHIPPED | OUTPATIENT
Start: 2018-02-08 | End: 2018-07-11 | Stop reason: ALTCHOICE

## 2018-02-08 NOTE — PROGRESS NOTES
HPI:    Patient ID: Gareth Freeman is a 61year old female. X 4 days  + strep contact  + medina  + cough  HPI    Review of Systems   Constitutional: Positive for chills, fatigue and fever (? low grade).    HENT: Positive for congestion, rhinorrhea, sinus pres Iodine                Radiology Contrast *    Rash   PHYSICAL EXAM:   Physical Exam   Constitutional: She is oriented to person, place, and time. She appears well-developed and well-nourished.    Mild ill appearing   HENT:   Right Ear: External ear normal.

## 2018-02-08 NOTE — PATIENT INSTRUCTIONS
REST,FLUIDS,ADVIL / TYLENOL PRN fever / body aches  CALL NO CHANGE WORSENING  DISCUSSED WARNING SIGNS  F/U No change 2-3 days  Humidifier, Vicks.

## 2018-07-09 ENCOUNTER — TELEPHONE (OUTPATIENT)
Dept: FAMILY MEDICINE CLINIC | Facility: CLINIC | Age: 58
End: 2018-07-09

## 2018-07-09 NOTE — TELEPHONE ENCOUNTER
Pt was called regarding labs. Orders were placed. There is a mammogram order from 1/17/2018. Unsure if a new order needs to be placed.

## 2018-07-09 NOTE — TELEPHONE ENCOUNTER
NEED MAMMO ORDER, CAN SHE GO TO [facility]? HAS PX APPT HERE 8/7, CAN LAB ORDERS BE PUT IN COMPUTER SO SHE CAN GET LABS DONE PRIOR TO PX? CALL PT TO SCHED LAB APPT & LET HER KNOW ABOUT MAMMO

## 2018-07-11 ENCOUNTER — OFFICE VISIT (OUTPATIENT)
Dept: FAMILY MEDICINE CLINIC | Facility: CLINIC | Age: 58
End: 2018-07-11

## 2018-07-11 ENCOUNTER — HOSPITAL ENCOUNTER (OUTPATIENT)
Dept: GENERAL RADIOLOGY | Age: 58
Discharge: HOME | End: 2018-07-11
Attending: FAMILY MEDICINE
Payer: COMMERCIAL

## 2018-07-11 VITALS
WEIGHT: 249.31 LBS | BODY MASS INDEX: 39.6 KG/M2 | TEMPERATURE: 99 F | SYSTOLIC BLOOD PRESSURE: 128 MMHG | DIASTOLIC BLOOD PRESSURE: 84 MMHG | HEIGHT: 66.5 IN

## 2018-07-11 DIAGNOSIS — M75.41 SHOULDER IMPINGEMENT SYNDROME, RIGHT: ICD-10-CM

## 2018-07-11 DIAGNOSIS — M75.41 SHOULDER IMPINGEMENT SYNDROME, RIGHT: Primary | ICD-10-CM

## 2018-07-11 DIAGNOSIS — Z12.31 VISIT FOR SCREENING MAMMOGRAM: ICD-10-CM

## 2018-07-11 DIAGNOSIS — E78.00 PURE HYPERCHOLESTEROLEMIA: ICD-10-CM

## 2018-07-11 PROCEDURE — 73030 X-RAY EXAM OF SHOULDER: CPT | Performed by: FAMILY MEDICINE

## 2018-07-11 PROCEDURE — 99214 OFFICE O/P EST MOD 30 MIN: CPT | Performed by: FAMILY MEDICINE

## 2018-07-11 RX ORDER — SIMVASTATIN 20 MG
20 TABLET ORAL NIGHTLY
Qty: 90 TABLET | Refills: 1 | Status: SHIPPED | OUTPATIENT
Start: 2018-07-11 | End: 2018-10-23
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
## 2018-07-11 NOTE — PATIENT INSTRUCTIONS
Understanding Shoulder Impingement Syndrome    Shoulder impingement syndrome is a problem with the shoulder joint. It occurs when certain parts within the joint swell and are pinched. This can cause nagging pain and problems with moving the arm.   What ca · Active rest. This allows the shoulder to heal. It means using the arm and shoulder, but avoiding activities that cause pain. These likely include reaching overhead or sleeping on your shoulder. · Cold packs. These help reduce swelling and relieve pain.

## 2018-07-17 ENCOUNTER — TELEPHONE (OUTPATIENT)
Dept: FAMILY MEDICINE CLINIC | Facility: CLINIC | Age: 58
End: 2018-07-17

## 2018-07-17 NOTE — TELEPHONE ENCOUNTER
BILL CANNOT GET HER IN SOON, PLEASE SEND ORDER FOR OPEN MRI RT SHOULDER   TO GO TO [facility]  [facility] [facility]  [address] [phone]

## 2018-07-25 ENCOUNTER — TELEPHONE (OUTPATIENT)
Dept: FAMILY MEDICINE CLINIC | Facility: CLINIC | Age: 58
End: 2018-07-25

## 2018-07-25 ENCOUNTER — HOSPITAL ENCOUNTER (OUTPATIENT)
Dept: MAMMOGRAPHY | Age: 58
Discharge: HOME | End: 2018-07-25
Attending: FAMILY MEDICINE
Payer: COMMERCIAL

## 2018-07-25 DIAGNOSIS — Z12.31 VISIT FOR SCREENING MAMMOGRAM: ICD-10-CM

## 2018-07-25 PROCEDURE — 77067 SCR MAMMO BI INCL CAD: CPT | Performed by: FAMILY MEDICINE

## 2018-07-25 NOTE — TELEPHONE ENCOUNTER
Navya Sanchez, your MRI of your right shoulder shows a moderate to high grade partial thickness tear of the supraspinatus tendon. I am referring you to Dr. Jazmine Gore. He is a shoulder expert. Dr. Becky Olivarez is also excellent and another option at [facility] orthopedics.

## 2018-07-26 NOTE — TELEPHONE ENCOUNTER
Left detailed message on identified vm. Numbers of Dr. Isabela Valadez ([phone]) and Dr. Isabela Bailey ([phone]) provided.  Office provided as well for call back if needed

## 2018-07-27 ENCOUNTER — TELEPHONE (OUTPATIENT)
Dept: FAMILY MEDICINE CLINIC | Facility: CLINIC | Age: 58
End: 2018-07-27

## 2018-07-27 RX ORDER — MELOXICAM 15 MG/1
15 TABLET ORAL DAILY
Qty: 30 TABLET | Refills: 0 | Status: SHIPPED | OUTPATIENT
Start: 2018-07-27 | End: 2018-08-21

## 2018-07-27 RX ORDER — ONDANSETRON 4 MG/1
4 TABLET, FILM COATED ORAL EVERY 8 HOURS PRN
Qty: 20 TABLET | Refills: 1 | Status: SHIPPED | OUTPATIENT
Start: 2018-07-27 | End: 2018-11-30

## 2018-07-27 NOTE — TELEPHONE ENCOUNTER
Left message for patient to return phone call. MRI results: Moderate to high-grade partial-thickness tearing involving the supraspinatus tendon as outlined above.   A very subtle full thickness tear would be difficult to completely exclude from this ex

## 2018-07-27 NOTE — TELEPHONE ENCOUNTER
Patient is having a lot of pain which is making her nauseated. She is asking for zofran and any other recommendations Dr. Maryam Moore. Patient does not want pain medications. Per Dr. Maryam Moore, send script for mobic 15mg #30 and zofran 4mg #20.   May try heat or i

## 2018-08-07 ENCOUNTER — LAB ENCOUNTER (OUTPATIENT)
Dept: LAB | Age: 58
End: 2018-08-07
Attending: FAMILY MEDICINE
Payer: COMMERCIAL

## 2018-08-07 ENCOUNTER — HOSPITAL ENCOUNTER (OUTPATIENT)
Dept: GENERAL RADIOLOGY | Age: 58
Discharge: HOME | End: 2018-08-07
Attending: FAMILY MEDICINE
Payer: COMMERCIAL

## 2018-08-07 ENCOUNTER — OFFICE VISIT (OUTPATIENT)
Dept: FAMILY MEDICINE CLINIC | Facility: CLINIC | Age: 58
End: 2018-08-07
Payer: COMMERCIAL

## 2018-08-07 VITALS
HEART RATE: 80 BPM | SYSTOLIC BLOOD PRESSURE: 134 MMHG | TEMPERATURE: 98 F | DIASTOLIC BLOOD PRESSURE: 88 MMHG | OXYGEN SATURATION: 98 % | WEIGHT: 247.13 LBS | BODY MASS INDEX: 38.34 KG/M2 | HEIGHT: 67.5 IN

## 2018-08-07 DIAGNOSIS — J45.21 MILD INTERMITTENT ASTHMA WITH EXACERBATION: ICD-10-CM

## 2018-08-07 DIAGNOSIS — E78.00 HYPERCHOLESTEREMIA: ICD-10-CM

## 2018-08-07 DIAGNOSIS — G90.521 REFLEX SYMPATHETIC DYSTROPHY OF RIGHT LOWER EXTREMITY: ICD-10-CM

## 2018-08-07 DIAGNOSIS — Z01.419 WELL WOMAN EXAM WITH ROUTINE GYNECOLOGICAL EXAM: ICD-10-CM

## 2018-08-07 DIAGNOSIS — K21.9 GASTROESOPHAGEAL REFLUX DISEASE, ESOPHAGITIS PRESENCE NOT SPECIFIED: ICD-10-CM

## 2018-08-07 DIAGNOSIS — M75.101 ROTATOR CUFF TEAR ARTHROPATHY, RIGHT: ICD-10-CM

## 2018-08-07 DIAGNOSIS — N95.1 HOT FLASHES DUE TO MENOPAUSE: ICD-10-CM

## 2018-08-07 DIAGNOSIS — I10 ESSENTIAL HYPERTENSION: ICD-10-CM

## 2018-08-07 DIAGNOSIS — M31.0 LEUCOCYTOCLASTIC VASCULITIS (HCC): ICD-10-CM

## 2018-08-07 DIAGNOSIS — M62.830 MUSCLE SPASM OF BACK: ICD-10-CM

## 2018-08-07 DIAGNOSIS — M25.861 IMPINGEMENT OF RIGHT KNEE JOINT: ICD-10-CM

## 2018-08-07 DIAGNOSIS — Z11.59 ENCOUNTER FOR HCV SCREENING TEST FOR LOW RISK PATIENT: ICD-10-CM

## 2018-08-07 DIAGNOSIS — Z01.419 WELL WOMAN EXAM WITH ROUTINE GYNECOLOGICAL EXAM: Primary | ICD-10-CM

## 2018-08-07 DIAGNOSIS — E03.9 HYPOTHYROIDISM, UNSPECIFIED TYPE: ICD-10-CM

## 2018-08-07 DIAGNOSIS — Z23 NEED FOR VACCINATION: ICD-10-CM

## 2018-08-07 DIAGNOSIS — E78.00 PURE HYPERCHOLESTEROLEMIA: ICD-10-CM

## 2018-08-07 DIAGNOSIS — M12.811 ROTATOR CUFF TEAR ARTHROPATHY, RIGHT: ICD-10-CM

## 2018-08-07 DIAGNOSIS — J45.20 MILD INTERMITTENT ASTHMA WITHOUT COMPLICATION: ICD-10-CM

## 2018-08-07 DIAGNOSIS — M62.838 MUSCLE SPASM: ICD-10-CM

## 2018-08-07 DIAGNOSIS — J30.89 SEASONAL ALLERGIC RHINITIS DUE TO OTHER ALLERGIC TRIGGER: ICD-10-CM

## 2018-08-07 LAB
ALBUMIN SERPL-MCNC: 4.2 G/DL (ref 3.5–4.8)
ALBUMIN/GLOB SERPL: 1 {RATIO} (ref 1–2)
ALP LIVER SERPL-CCNC: 84 U/L (ref 46–118)
ALT SERPL-CCNC: 20 U/L (ref 14–54)
ANION GAP SERPL CALC-SCNC: 9 MMOL/L (ref 0–18)
AST SERPL-CCNC: 22 U/L (ref 15–41)
BASOPHILS # BLD AUTO: 0.04 X10(3) UL (ref 0–0.1)
BASOPHILS NFR BLD AUTO: 0.8 %
BILIRUB SERPL-MCNC: 0.6 MG/DL (ref 0.1–2)
BUN BLD-MCNC: 11 MG/DL (ref 8–20)
BUN/CREAT SERPL: 10.9 (ref 10–20)
CALCIUM BLD-MCNC: 9.2 MG/DL (ref 8.3–10.3)
CHLORIDE SERPL-SCNC: 108 MMOL/L (ref 101–111)
CHOLEST SMN-MCNC: 204 MG/DL (ref ?–200)
CK SERPL-CCNC: 143 IU/L (ref 26–192)
CO2 SERPL-SCNC: 24 MMOL/L (ref 22–32)
CREAT BLD-MCNC: 1.01 MG/DL (ref 0.55–1.02)
EOSINOPHIL # BLD AUTO: 0.08 X10(3) UL (ref 0–0.3)
EOSINOPHIL NFR BLD AUTO: 1.6 %
ERYTHROCYTE [DISTWIDTH] IN BLOOD BY AUTOMATED COUNT: 12.8 % (ref 11.5–16)
GLOBULIN PLAS-MCNC: 4.1 G/DL (ref 2.5–3.7)
GLUCOSE BLD-MCNC: 93 MG/DL (ref 70–99)
HCT VFR BLD AUTO: 42.9 % (ref 34–50)
HCV AB SERPL QL IA: NONREACTIVE
HDLC SERPL-MCNC: 48 MG/DL (ref 40–59)
HGB BLD-MCNC: 14.4 G/DL (ref 12–16)
IMMATURE GRANULOCYTE COUNT: 0.01 X10(3) UL (ref 0–1)
IMMATURE GRANULOCYTE RATIO %: 0.2 %
LDLC SERPL CALC-MCNC: 132 MG/DL (ref ?–100)
LYMPHOCYTES # BLD AUTO: 1.53 X10(3) UL (ref 0.9–4)
LYMPHOCYTES NFR BLD AUTO: 30.8 %
M PROTEIN MFR SERPL ELPH: 8.3 G/DL (ref 6.1–8.3)
MCH RBC QN AUTO: 28.3 PG (ref 27–33.2)
MCHC RBC AUTO-ENTMCNC: 33.6 G/DL (ref 31–37)
MCV RBC AUTO: 84.4 FL (ref 81–100)
MONOCYTES # BLD AUTO: 0.66 X10(3) UL (ref 0.1–1)
MONOCYTES NFR BLD AUTO: 13.3 %
NEUTROPHIL ABS PRELIM: 2.64 X10 (3) UL (ref 1.3–6.7)
NEUTROPHILS # BLD AUTO: 2.64 X10(3) UL (ref 1.3–6.7)
NEUTROPHILS NFR BLD AUTO: 53.3 %
NONHDLC SERPL-MCNC: 156 MG/DL (ref ?–130)
OSMOLALITY SERPL CALC.SUM OF ELEC: 291 MOSM/KG (ref 275–295)
PLATELET # BLD AUTO: 223 10(3)UL (ref 150–450)
POTASSIUM SERPL-SCNC: 4.2 MMOL/L (ref 3.6–5.1)
RBC # BLD AUTO: 5.08 X10(6)UL (ref 3.8–5.1)
RED CELL DISTRIBUTION WIDTH-SD: 38.9 FL (ref 35.1–46.3)
SODIUM SERPL-SCNC: 141 MMOL/L (ref 136–144)
T4 FREE SERPL-MCNC: 0.9 NG/DL (ref 0.9–1.8)
TRIGL SERPL-MCNC: 122 MG/DL (ref 30–149)
TSI SER-ACNC: 10.1 MIU/ML (ref 0.35–5.5)
VIT D+METAB SERPL-MCNC: 15.3 NG/ML (ref 30–100)
VLDLC SERPL CALC-MCNC: 24 MG/DL (ref 0–30)
WBC # BLD AUTO: 5 X10(3) UL (ref 4–13)

## 2018-08-07 PROCEDURE — 82550 ASSAY OF CK (CPK): CPT | Performed by: FAMILY MEDICINE

## 2018-08-07 PROCEDURE — 80061 LIPID PANEL: CPT | Performed by: FAMILY MEDICINE

## 2018-08-07 PROCEDURE — 36415 COLL VENOUS BLD VENIPUNCTURE: CPT | Performed by: FAMILY MEDICINE

## 2018-08-07 PROCEDURE — 86803 HEPATITIS C AB TEST: CPT | Performed by: FAMILY MEDICINE

## 2018-08-07 PROCEDURE — 88175 CYTOPATH C/V AUTO FLUID REDO: CPT | Performed by: FAMILY MEDICINE

## 2018-08-07 PROCEDURE — 99396 PREV VISIT EST AGE 40-64: CPT | Performed by: FAMILY MEDICINE

## 2018-08-07 PROCEDURE — 99213 OFFICE O/P EST LOW 20 MIN: CPT | Performed by: FAMILY MEDICINE

## 2018-08-07 PROCEDURE — 90715 TDAP VACCINE 7 YRS/> IM: CPT | Performed by: FAMILY MEDICINE

## 2018-08-07 PROCEDURE — 90472 IMMUNIZATION ADMIN EACH ADD: CPT | Performed by: FAMILY MEDICINE

## 2018-08-07 PROCEDURE — 90471 IMMUNIZATION ADMIN: CPT | Performed by: FAMILY MEDICINE

## 2018-08-07 PROCEDURE — 84439 ASSAY OF FREE THYROXINE: CPT | Performed by: FAMILY MEDICINE

## 2018-08-07 PROCEDURE — 73560 X-RAY EXAM OF KNEE 1 OR 2: CPT | Performed by: FAMILY MEDICINE

## 2018-08-07 PROCEDURE — 82306 VITAMIN D 25 HYDROXY: CPT | Performed by: FAMILY MEDICINE

## 2018-08-07 PROCEDURE — 80050 GENERAL HEALTH PANEL: CPT | Performed by: FAMILY MEDICINE

## 2018-08-07 PROCEDURE — 90732 PPSV23 VACC 2 YRS+ SUBQ/IM: CPT | Performed by: FAMILY MEDICINE

## 2018-08-07 RX ORDER — FLUTICASONE PROPIONATE AND SALMETEROL 250; 50 UG/1; UG/1
1 POWDER RESPIRATORY (INHALATION) EVERY 12 HOURS SCHEDULED
Qty: 60 EACH | Refills: 0 | Status: SHIPPED | OUTPATIENT
Start: 2018-08-07 | End: 2020-02-08

## 2018-08-07 RX ORDER — IPRATROPIUM BROMIDE AND ALBUTEROL SULFATE 2.5; .5 MG/3ML; MG/3ML
3 SOLUTION RESPIRATORY (INHALATION) 4 TIMES DAILY PRN
Qty: 120 ML | Refills: 0 | Status: SHIPPED | OUTPATIENT
Start: 2018-08-07 | End: 2019-08-20

## 2018-08-07 RX ORDER — HYDROCODONE BITARTRATE AND ACETAMINOPHEN 10; 325 MG/1; MG/1
1 TABLET ORAL EVERY 6 HOURS PRN
Qty: 15 TABLET | Refills: 0 | Status: SHIPPED | OUTPATIENT
Start: 2018-08-07 | End: 2019-05-28 | Stop reason: ALTCHOICE

## 2018-08-07 RX ORDER — CYCLOBENZAPRINE HCL 10 MG
10 TABLET ORAL 3 TIMES DAILY
Qty: 90 TABLET | Refills: 0 | Status: SHIPPED | OUTPATIENT
Start: 2018-08-07 | End: 2018-11-30

## 2018-08-07 RX ORDER — ALBUTEROL SULFATE 90 UG/1
2 AEROSOL, METERED RESPIRATORY (INHALATION) EVERY 4 HOURS PRN
Qty: 1 INHALER | Refills: 6 | Status: SHIPPED | OUTPATIENT
Start: 2018-08-07

## 2018-08-07 NOTE — PATIENT INSTRUCTIONS
Prevention Guidelines, Women Ages 48 to 59  Screening tests and vaccines are an important part of managing your health. A screening test is done to find possible disorders or diseases in people who don't have any symptoms.  The goal is to find a disease e Chlamydia Women at increased risk for infection At routine exams   Colorectal cancer All women in this age group Flexible sigmoidoscopy every 5 years, or colonoscopy every 10 years, or double-contrast barium enema every 5 years; yearly fecal occult blood t Hepatitis B Women at increased risk for infection – talk with your healthcare provider 3 doses over 6 months; second dose should be given 1 month after the first dose; the third dose should be given at least 2 months after the second dose and at least 4 mo Use of daily aspirin Women ages 54 and up in this age group who are at risk for cardiovascular health problems such as stroke When your risk is known   Use of tobacco and the health effects it can cause All women in this age group Every exam   1Amerjanet Ca · Stay off the injured leg as much as possible until you can walk on it without pain. If you have a lot of pain when walking, crutches or a walker may be prescribed.  (These can be rented or bought at [employer] and surgical or orthopedic supply stores · Check with your healthcare provider before returning to sports or full work duties. Follow-up care  Follow up with your healthcare provider, or as advised. This is usually within 1 to 2 weeks.  Further testing may be required to check the extent of your

## 2018-08-07 NOTE — H&P
HPI:   Minnie Devlin is a 60year old female who presents for a complete physical exam. Symptoms: is menopausal. Patient complains of  Right shoulder pain  Due to rotator cuff tear and has appt with Freya Munoz in 2 weeks. Frustrated cant get in earlier.  He (four) times daily as needed. Disp: 120 mL Rfl: 0   fluticasone-salmeterol 250-50 MCG/DOSE Inhalation Aerosol Powder, Breath Activated Inhale 1 puff into the lungs every 12 (twelve) hours.  Disp: 60 each Rfl: 0   Albuterol Sulfate HFA (PROAIR HFA) 108 (90 B PONV (postoperative nausea and vomiting)    • Pure hypercholesterolemia 10/18/2007   • Restless legs syndrome (RLS)    • Symptomatic menopausal or female climacteric states 2/7/2009   • Thyroid disease    • Ulcer    • Vasomotor symptoms due to menopause headaches  PSYCHE: denies depression or anxiety  HEMATOLOGIC: denies hx of anemia  ENDOCRINE: + thyroid history  ALL/ASTHMA:  hx of allergy  And asthma - currently stable    EXAM:   BP (!) 148/102   Pulse 80   Temp 98 °F (36.7 °C) (Tympanic)   Ht 67.5\" mg bid    6. Hypothyroidism, unspecified type  - synthroid 200 mcg daily  - TSH and T4    7. Hypercholesteremia  - simvastatin 20 mg daily   - CK, lipids, Comp  -  Low chol diet    8. Leucocytoclastic vasculitis [facility]  - follow up with travis    9.  Reflex sym

## 2018-08-08 DIAGNOSIS — E55.9 VITAMIN D DEFICIENCY: Primary | ICD-10-CM

## 2018-08-08 DIAGNOSIS — E03.9 HYPOTHYROIDISM, UNSPECIFIED TYPE: ICD-10-CM

## 2018-08-08 RX ORDER — LEVOTHYROXINE AND LIOTHYRONINE 57; 13.5 UG/1; UG/1
90 TABLET ORAL DAILY
Qty: 90 TABLET | Refills: 0 | Status: SHIPPED | OUTPATIENT
Start: 2018-08-08 | End: 2018-10-23

## 2018-08-09 LAB
LAST PAP RESULT: NORMAL
PAP HISTORY (OTHER THAN LAST PAP): NORMAL

## 2018-08-14 ENCOUNTER — HOSPITAL ENCOUNTER (OUTPATIENT)
Dept: MRI IMAGING | Age: 58
Discharge: HOME | End: 2018-08-14
Attending: FAMILY MEDICINE
Payer: COMMERCIAL

## 2018-08-14 DIAGNOSIS — M25.861 IMPINGEMENT OF RIGHT KNEE JOINT: ICD-10-CM

## 2018-08-14 PROCEDURE — 73721 MRI JNT OF LWR EXTRE W/O DYE: CPT | Performed by: FAMILY MEDICINE

## 2018-08-20 ENCOUNTER — TELEPHONE (OUTPATIENT)
Dept: FAMILY MEDICINE CLINIC | Facility: CLINIC | Age: 58
End: 2018-08-20

## 2018-08-20 NOTE — TELEPHONE ENCOUNTER
I spoke to the pt and she is aware and v/u. She states Dr Kate Bragg office needs a call back from us to let them know this time frame will be ok. Pt will come in around 1 pm today to have a knee immobilizer placed.  tanesha    I called Dr Kate Bragg office and spoke to

## 2018-08-20 NOTE — TELEPHONE ENCOUNTER
SHE SAID THAT DR EDMUNDO DE SOUZA WILL NOT BE IN THE OFFICE UNTIL NEXT WEEK AND WON'T EVEN PUT HER ON THE APPOINTMENT BOOK UNTIL DR Pardeep Llamas SAYS THAT IT IS OK TO WAIT THAT LONG.  MONI SAID THAT YOU CAN CALL HER [phone] OR HER DIRECT LINE AT WORK -

## 2018-08-20 NOTE — TELEPHONE ENCOUNTER
Notes recorded by Marcela Angelo DO on 8/17/2018 at 11:57 AM CDT  Ney, your MRI does not show a meniscal tear. It appears there may be a stress fracture to the anterior medial tibial plateau. Keep follow up with ortho for this.  If the wait is long we ca

## 2018-09-13 ENCOUNTER — TELEPHONE (OUTPATIENT)
Dept: FAMILY MEDICINE CLINIC | Facility: CLINIC | Age: 58
End: 2018-09-13

## 2018-09-13 NOTE — TELEPHONE ENCOUNTER
BUG OR SPIDER BITE ON LEG, HAS BEEN THERE FOR ABOUT A WEEK, SWOLLEN, TENDER, LEG PAIN, HEADACHES, NAUSEA, CALL PT

## 2018-10-01 PROBLEM — M75.111 INCOMPLETE TEAR OF RIGHT ROTATOR CUFF: Status: ACTIVE | Noted: 2018-10-01

## 2018-10-01 PROBLEM — M84.30XA STRESS REACTION OF BONE: Status: ACTIVE | Noted: 2018-10-01

## 2018-10-01 PROBLEM — M75.81 TENDINITIS OF RIGHT ROTATOR CUFF: Status: ACTIVE | Noted: 2018-10-01

## 2018-10-09 ENCOUNTER — APPOINTMENT (OUTPATIENT)
Dept: LAB | Age: 58
End: 2018-10-09
Attending: FAMILY MEDICINE
Payer: COMMERCIAL

## 2018-10-09 DIAGNOSIS — E03.9 HYPOTHYROIDISM, UNSPECIFIED TYPE: Primary | ICD-10-CM

## 2018-10-09 DIAGNOSIS — E03.9 HYPOTHYROIDISM, UNSPECIFIED TYPE: ICD-10-CM

## 2018-10-09 PROCEDURE — 84443 ASSAY THYROID STIM HORMONE: CPT | Performed by: FAMILY MEDICINE

## 2018-10-09 PROCEDURE — 36415 COLL VENOUS BLD VENIPUNCTURE: CPT | Performed by: FAMILY MEDICINE

## 2018-10-09 PROCEDURE — 84439 ASSAY OF FREE THYROXINE: CPT | Performed by: FAMILY MEDICINE

## 2018-10-23 ENCOUNTER — TELEPHONE (OUTPATIENT)
Dept: FAMILY MEDICINE CLINIC | Facility: CLINIC | Age: 58
End: 2018-10-23

## 2018-10-23 DIAGNOSIS — E03.9 HYPOTHYROIDISM, UNSPECIFIED TYPE: ICD-10-CM

## 2018-10-23 DIAGNOSIS — R09.89 LABILE HYPERTENSION: ICD-10-CM

## 2018-10-23 DIAGNOSIS — E78.00 PURE HYPERCHOLESTEROLEMIA: ICD-10-CM

## 2018-10-23 RX ORDER — LISINOPRIL 10 MG/1
TABLET ORAL
Qty: 90 TABLET | Refills: 0 | Status: SHIPPED | OUTPATIENT
Start: 2018-10-23 | End: 2018-11-29

## 2018-10-23 RX ORDER — SIMVASTATIN 20 MG
20 TABLET ORAL NIGHTLY
Qty: 90 TABLET | Refills: 0 | Status: SHIPPED | OUTPATIENT
Start: 2018-10-23 | End: 2018-11-29

## 2018-10-23 RX ORDER — OMEPRAZOLE 40 MG/1
40 CAPSULE, DELAYED RELEASE ORAL
Qty: 90 CAPSULE | Refills: 0 | Status: SHIPPED | OUTPATIENT
Start: 2018-10-23 | End: 2018-11-29

## 2018-10-23 RX ORDER — LEVOTHYROXINE AND LIOTHYRONINE 57; 13.5 UG/1; UG/1
90 TABLET ORAL DAILY
Qty: 90 TABLET | Refills: 0 | Status: SHIPPED | OUTPATIENT
Start: 2018-10-23 | End: 2018-11-29

## 2018-10-23 NOTE — TELEPHONE ENCOUNTER
Simvastatin 7/11/18 #90x1  Prilosec 9/2/16 #90x0  Lisinopril 12/2/17 #180x0  [place] 8/8/18 #90x0  Last ov 8/7/18  Last TSH 10/9/18 re ck in 6 mo  Last labs 8/7/18 re ck in 6 mo    No future appointments.

## 2018-10-25 ENCOUNTER — TELEPHONE (OUTPATIENT)
Dept: FAMILY MEDICINE CLINIC | Facility: CLINIC | Age: 58
End: 2018-10-25

## 2018-10-25 RX ORDER — AMOXICILLIN 875 MG/1
875 TABLET, COATED ORAL 2 TIMES DAILY
Qty: 20 TABLET | Refills: 0 | Status: SHIPPED | OUTPATIENT
Start: 2018-10-25 | End: 2018-11-04

## 2018-10-25 NOTE — TELEPHONE ENCOUNTER
Pt. Davian Lo but cannot be seen today wanted to know if she can get some meds.  Congestion, raw throat,

## 2018-10-25 NOTE — TELEPHONE ENCOUNTER
Called and spoke to patient notified of information. Instructed to call back if no improvement after abx. Patient agreeable.

## 2018-10-25 NOTE — TELEPHONE ENCOUNTER
Returned phone call to patient states that symptoms atarted 2 days ago, people at work with ST dx'd with Strep states she is to busy and unable to come in for appt. Wanting to know if you would give her something to start and she would make appt Saturday.

## 2018-11-29 DIAGNOSIS — M62.838 MUSCLE SPASM: ICD-10-CM

## 2018-11-29 DIAGNOSIS — M62.830 MUSCLE SPASM OF BACK: ICD-10-CM

## 2018-11-29 DIAGNOSIS — E03.9 HYPOTHYROIDISM, UNSPECIFIED TYPE: ICD-10-CM

## 2018-11-29 DIAGNOSIS — E78.00 PURE HYPERCHOLESTEROLEMIA: ICD-10-CM

## 2018-11-29 DIAGNOSIS — R09.89 LABILE HYPERTENSION: ICD-10-CM

## 2018-11-29 RX ORDER — SIMVASTATIN 20 MG
20 TABLET ORAL NIGHTLY
Qty: 90 TABLET | Refills: 1 | Status: SHIPPED | OUTPATIENT
Start: 2018-11-29 | End: 2019-02-27

## 2018-11-29 RX ORDER — LEVOTHYROXINE AND LIOTHYRONINE 57; 13.5 UG/1; UG/1
90 TABLET ORAL DAILY
Qty: 90 TABLET | Refills: 1 | Status: SHIPPED | OUTPATIENT
Start: 2018-11-29 | End: 2019-01-29

## 2018-11-29 RX ORDER — LISINOPRIL 10 MG/1
TABLET ORAL
Qty: 90 TABLET | Refills: 1 | Status: SHIPPED | OUTPATIENT
Start: 2018-11-29 | End: 2019-01-29

## 2018-11-29 RX ORDER — OMEPRAZOLE 40 MG/1
40 CAPSULE, DELAYED RELEASE ORAL
Qty: 90 CAPSULE | Refills: 1 | Status: SHIPPED | OUTPATIENT
Start: 2018-11-29 | End: 2019-05-28 | Stop reason: ALTCHOICE

## 2018-11-29 NOTE — TELEPHONE ENCOUNTER
Called and spoke to patient to verify what she was needing refills on.    LOV- 8/7/2018   Last labs- 8/7/2018  Omeprazole, simvastatin, Lisinopril Amour refilled per protocols #90 x 1 refill  Patient is also needing refills on zofran and her flexeril - medi

## 2018-11-30 RX ORDER — CYCLOBENZAPRINE HCL 10 MG
10 TABLET ORAL 3 TIMES DAILY
Qty: 90 TABLET | Refills: 0 | Status: SHIPPED | OUTPATIENT
Start: 2018-11-30 | End: 2019-08-20 | Stop reason: ALTCHOICE

## 2018-11-30 RX ORDER — ONDANSETRON 4 MG/1
4 TABLET, FILM COATED ORAL EVERY 8 HOURS PRN
Qty: 20 TABLET | Refills: 1 | Status: SHIPPED | OUTPATIENT
Start: 2018-11-30 | End: 2019-08-20 | Stop reason: ALTCHOICE

## 2018-12-03 ENCOUNTER — OFFICE VISIT (OUTPATIENT)
Dept: FAMILY MEDICINE CLINIC | Facility: CLINIC | Age: 58
End: 2018-12-03
Payer: COMMERCIAL

## 2018-12-03 VITALS
DIASTOLIC BLOOD PRESSURE: 88 MMHG | OXYGEN SATURATION: 96 % | HEART RATE: 78 BPM | SYSTOLIC BLOOD PRESSURE: 138 MMHG | TEMPERATURE: 98 F

## 2018-12-03 DIAGNOSIS — J01.90 ACUTE RHINOSINUSITIS: Primary | ICD-10-CM

## 2018-12-03 PROCEDURE — 99213 OFFICE O/P EST LOW 20 MIN: CPT | Performed by: FAMILY MEDICINE

## 2018-12-03 RX ORDER — AZITHROMYCIN 250 MG/1
TABLET, FILM COATED ORAL
Qty: 6 TABLET | Refills: 0 | Status: SHIPPED | OUTPATIENT
Start: 2018-12-03 | End: 2018-12-08

## 2018-12-03 NOTE — PROGRESS NOTES
HPI:   Tricia Mercado is a 60year old female who presents for upper respiratory symptoms for  3  weeks.  Patient reports sore throat, congestion, low grade fever, cough with mildly productive colored sputum, ear pain, sinus pain, OTC cold meds have not been Montelukast Sodium (SINGULAIR) 10 MG Oral Tab Take 1 tablet (10 mg total) by mouth every evening.  Disp: 180 tablet Rfl: 3      Past Medical History:   Diagnosis Date   • Allergic rhinitis, cause unspecified 4/4/2011   • ASTHMA    • Asthma    • Chronic pa status: Never Smoker      Smokeless tobacco: Never Used      Tobacco comment: Non smoker     Alcohol use: No      Alcohol/week: 0.0 oz    Drug use: No        REVIEW OF SYSTEMS:   GENERAL: feels well otherwise  SKIN: no rashes  EYES:denies blurred vision or

## 2018-12-03 NOTE — PATIENT INSTRUCTIONS
.  Azithromycin tablets  Brand Names: Zithromax, Zithromax Tri-Vincent, Zithromax Z-Vincent  What is this medicine? AZITHROMYCIN (az ith robert MYE sin) is a macrolide antibiotic. It is used to treat or prevent certain kinds of bacterial infections.  It will not work This medicine may also interact with the following medications:  · amiodarone  · antacids  · birth control pills  · cyclosporine  · digoxin  · magnesium  · nelfinavir  · phenytoin  · warfarin  What if I miss a dose?   If you miss a dose, take it as soon as

## 2018-12-08 RX ORDER — THYROID,PORK 90 MG
TABLET ORAL
Qty: 90 TABLET | Refills: 1 | Status: SHIPPED | OUTPATIENT
Start: 2018-12-08 | End: 2019-05-30

## 2019-01-07 ENCOUNTER — TELEPHONE (OUTPATIENT)
Dept: FAMILY MEDICINE CLINIC | Facility: CLINIC | Age: 59
End: 2019-01-07

## 2019-01-07 NOTE — TELEPHONE ENCOUNTER
Pt is in-pt at [facility]. She had an MRI on Friday. She states she went to  and they transferred her to [facility]. She states she got some really upsetting news.  She states they placed a stent and if Dr. Norm Carrera would like to call the pt so she can update her

## 2019-01-07 NOTE — TELEPHONE ENCOUNTER
MONI IS AT [facility], SHE HAD AN MI ON FRIDAY. SHE WOULD LIKE TO SPEAK TO Nirav Simon.

## 2019-01-09 NOTE — TELEPHONE ENCOUNTER
Srinivasa Leone was discharged to home 2 days ago for MI with stent at circumflex. Srinivasa Leone states that her granddaughter Ines Mckoy is in a custody maldonado. On Saskia day was with Margarita and she shared that she had some bruising to her legs. Complained of leg pain.  Myla Lucio

## 2019-01-11 ENCOUNTER — TELEPHONE (OUTPATIENT)
Dept: FAMILY MEDICINE CLINIC | Facility: CLINIC | Age: 59
End: 2019-01-11

## 2019-01-11 NOTE — TELEPHONE ENCOUNTER
MONI STATED SHE NEEDS TO SEE DR BEST TODAY OR TOMORROW FOR STRESS.   SHE RECENTLY HAD A HEART ATTACK AS WELL

## 2019-01-18 PROBLEM — I21.4 NSTEMI (NON-ST ELEVATED MYOCARDIAL INFARCTION) (HCC): Status: ACTIVE | Noted: 2018-01-16

## 2019-01-29 ENCOUNTER — HOSPITAL ENCOUNTER (OUTPATIENT)
Dept: GENERAL RADIOLOGY | Age: 59
Discharge: HOME | End: 2019-01-29
Attending: FAMILY MEDICINE
Payer: OTHER MISCELLANEOUS

## 2019-01-29 ENCOUNTER — OFFICE VISIT (OUTPATIENT)
Dept: FAMILY MEDICINE CLINIC | Facility: CLINIC | Age: 59
End: 2019-01-29
Payer: COMMERCIAL

## 2019-01-29 VITALS
BODY MASS INDEX: 38 KG/M2 | TEMPERATURE: 98 F | SYSTOLIC BLOOD PRESSURE: 130 MMHG | WEIGHT: 243.38 LBS | OXYGEN SATURATION: 99 % | HEART RATE: 88 BPM | DIASTOLIC BLOOD PRESSURE: 90 MMHG

## 2019-01-29 DIAGNOSIS — M25.562 ACUTE PAIN OF LEFT KNEE: Primary | ICD-10-CM

## 2019-01-29 DIAGNOSIS — M79.601 PAIN OF RIGHT UPPER EXTREMITY: ICD-10-CM

## 2019-01-29 DIAGNOSIS — M25.562 ACUTE PAIN OF LEFT KNEE: ICD-10-CM
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
## 2019-01-29 PROCEDURE — 73060 X-RAY EXAM OF HUMERUS: CPT | Performed by: FAMILY MEDICINE

## 2019-01-29 PROCEDURE — 99214 OFFICE O/P EST MOD 30 MIN: CPT | Performed by: FAMILY MEDICINE

## 2019-01-29 PROCEDURE — 73560 X-RAY EXAM OF KNEE 1 OR 2: CPT | Performed by: FAMILY MEDICINE

## 2019-01-29 RX ORDER — ASPIRIN 325 MG
325 TABLET ORAL
COMMUNITY
Start: 2019-01-16

## 2019-01-29 RX ORDER — ISOSORBIDE MONONITRATE 30 MG/1
0.5 TABLET, EXTENDED RELEASE ORAL
COMMUNITY
Start: 2019-01-07 | End: 2021-07-27

## 2019-01-29 RX ORDER — CLOPIDOGREL BISULFATE 75 MG/1
1 TABLET ORAL
COMMUNITY
Start: 2019-01-07 | End: 2021-04-16

## 2019-01-29 RX ORDER — FAMOTIDINE 20 MG/1
TABLET ORAL
COMMUNITY
End: 2020-10-23

## 2019-01-29 RX ORDER — CARVEDILOL 6.25 MG/1
4 TABLET ORAL 2 TIMES DAILY WITH MEALS
COMMUNITY
Start: 2019-01-07 | End: 2020-10-23

## 2019-01-29 NOTE — PROGRESS NOTES
Mery Wilson is a 60year old female. Patient presents with: Worker's Comp: fell yesterday. . hurt left shoulder. . landed on hands and left knee injury. . room 5    Subjective   HPI:   In  Her office yesterday  A strap of a bag was around her ankle and sh Aerosol Powder, Breath Activated Inhale 1 puff into the lungs every 12 (twelve) hours.  Disp: 60 each Rfl: 0   Albuterol Sulfate HFA (PROAIR HFA) 108 (90 Base) MCG/ACT Inhalation Aero Soln Inhale 2 puffs into the lungs every 4 (four) hours as needed for [facility] No       BP Readings from Last 6 Encounters:  01/29/19 : 130/90  12/03/18 : 138/88  08/07/18 : 134/88  07/11/18 : 128/84  02/08/18 : 138/80  01/17/18 : 132/88      Wt Readings from Last 6 Encounters:  01/29/19 : 243 lb 6 oz  08/07/18 : 247 lb 2 oz  07/11/1 recommended,    Relevant Orders    XR HUMERUS (MIN 2 VIEWS), RIGHT (CPT=73060)    Fall, initial encounter        Relevant Orders    XR KNEE (1 OR 2 VIEWS), LEFT (CPT=73560)            Return if symptoms worsen or fail to improve.           Meds & Refills fo

## 2019-05-09 ENCOUNTER — PATIENT OUTREACH (OUTPATIENT)
Dept: FAMILY MEDICINE CLINIC | Facility: CLINIC | Age: 59
End: 2019-05-09

## 2019-05-28 ENCOUNTER — OFFICE VISIT (OUTPATIENT)
Dept: FAMILY MEDICINE CLINIC | Facility: CLINIC | Age: 59
End: 2019-05-28
Payer: COMMERCIAL

## 2019-05-28 VITALS
DIASTOLIC BLOOD PRESSURE: 90 MMHG | SYSTOLIC BLOOD PRESSURE: 160 MMHG | BODY MASS INDEX: 38.61 KG/M2 | HEART RATE: 74 BPM | OXYGEN SATURATION: 98 % | HEIGHT: 67 IN | TEMPERATURE: 98 F | WEIGHT: 246 LBS

## 2019-05-28 DIAGNOSIS — R55 VASOVAGAL SYNCOPE: ICD-10-CM

## 2019-05-28 DIAGNOSIS — I21.4 NSTEMI (NON-ST ELEVATED MYOCARDIAL INFARCTION) (HCC): ICD-10-CM

## 2019-05-28 DIAGNOSIS — T88.7XXA MEDICATION SIDE EFFECT: ICD-10-CM

## 2019-05-28 DIAGNOSIS — J45.21 EXACERBATION OF INTERMITTENT ASTHMA: ICD-10-CM

## 2019-05-28 DIAGNOSIS — J30.89 OTHER ALLERGIC RHINITIS: ICD-10-CM

## 2019-05-28 DIAGNOSIS — I25.10 CORONARY ARTERIOSCLEROSIS: ICD-10-CM

## 2019-05-28 DIAGNOSIS — M75.41 IMPINGEMENT SYNDROME OF RIGHT SHOULDER: ICD-10-CM

## 2019-05-28 DIAGNOSIS — E78.00 PURE HYPERCHOLESTEROLEMIA: ICD-10-CM

## 2019-05-28 DIAGNOSIS — Z12.31 VISIT FOR SCREENING MAMMOGRAM: Primary | ICD-10-CM

## 2019-05-28 PROCEDURE — 99214 OFFICE O/P EST MOD 30 MIN: CPT | Performed by: FAMILY MEDICINE

## 2019-05-28 RX ORDER — MONTELUKAST SODIUM 10 MG/1
10 TABLET ORAL EVERY EVENING
Qty: 180 TABLET | Refills: 3 | Status: SHIPPED | OUTPATIENT
Start: 2019-05-28

## 2019-05-28 NOTE — PATIENT INSTRUCTIONS
Avoiding Slips, Trips, and Falls for Healthcare Workers    Common hazards like water spills and burned-out light bulbs can lead to serious, painful injuries—and could also limit your ability to respond to emergencies.  Protect yourself, your coworkers, an © 7574-4721 [address]. [address] [facility], [facility] [place]. All rights reserved. This information is not intended as a substitute for professional medical care. Always follow your healthcare professional's instructions.

## 2019-05-28 NOTE — PROGRESS NOTES
Esther Jacques is a 60year old female. HPI:   Earle Grijlava is here for evaluation after a fall x 2   Feels medication induced. On carvedilol 6.25 mg tid ( was on qid and took dose away). Was pushing a iron arbor away from a window.  got dizzy and fell onto arbor 180 tablet Rfl: 3   famoTIDine 20 MG Oral Tab Take by mouth. Disp:  Rfl:    Isosorbide Mononitrate ER 30 MG Oral Tablet 24 Hr Take 0.5 tablets by mouth.  Disp:  Rfl:       Past Medical History:   Diagnosis Date   • Allergic rhinitis, cause unspecified 4/4/2 normocephalic,ears and throat are clear  NECK: supple,no adenopathy, anterior cicatrix   LUNGS: clear to auscultation  CARDIO: RRR without murmur  GI: good BS's,no masses, HSM or tenderness  EXTREMITIES: right shoulder with crepitance. She is guarding.  Becky Vallejo

## 2019-05-29 ENCOUNTER — HOSPITAL ENCOUNTER (OUTPATIENT)
Dept: GENERAL RADIOLOGY | Age: 59
Discharge: HOME | End: 2019-05-29
Attending: FAMILY MEDICINE
Payer: COMMERCIAL

## 2019-05-29 DIAGNOSIS — M75.41 IMPINGEMENT SYNDROME OF RIGHT SHOULDER: ICD-10-CM

## 2019-05-29 DIAGNOSIS — S43.431A TEAR OF RIGHT GLENOID LABRUM, INITIAL ENCOUNTER: Primary | ICD-10-CM

## 2019-05-29 PROCEDURE — 73030 X-RAY EXAM OF SHOULDER: CPT | Performed by: FAMILY MEDICINE

## 2019-05-30 RX ORDER — THYROID,PORK 90 MG
90 TABLET ORAL
Qty: 90 TABLET | Refills: 3 | Status: SHIPPED | OUTPATIENT
Start: 2019-05-30 | End: 2019-10-07

## 2019-05-30 NOTE — TELEPHONE ENCOUNTER
Received refill request from [facility] pharmacy per written order on paper DS states okay to refill #90 with 3 refills. Mediation sent to pharmacy.

## 2019-06-03 ENCOUNTER — TELEPHONE (OUTPATIENT)
Dept: FAMILY MEDICINE CLINIC | Facility: CLINIC | Age: 59
End: 2019-06-03

## 2019-06-03 DIAGNOSIS — R55 VASOVAGAL SYNCOPE: Primary | ICD-10-CM

## 2019-06-04 ENCOUNTER — HOSPITAL ENCOUNTER (OUTPATIENT)
Dept: CV DIAGNOSTICS | Facility: HOSPITAL | Age: 59
Discharge: HOME | End: 2019-06-04
Attending: FAMILY MEDICINE
Payer: COMMERCIAL

## 2019-06-04 DIAGNOSIS — R55 VASOVAGAL SYNCOPE: ICD-10-CM

## 2019-06-04 PROCEDURE — 93271 ECG/MONITORING AND ANALYSIS: CPT | Performed by: FAMILY MEDICINE

## 2019-06-04 PROCEDURE — 93272 ECG/REVIEW INTERPRET ONLY: CPT | Performed by: FAMILY MEDICINE

## 2019-06-04 PROCEDURE — 93270 REMOTE 30 DAY ECG REV/REPORT: CPT | Performed by: FAMILY MEDICINE

## 2019-06-06 ENCOUNTER — TELEPHONE (OUTPATIENT)
Dept: FAMILY MEDICINE CLINIC | Facility: CLINIC | Age: 59
End: 2019-06-06

## 2019-06-06 NOTE — TELEPHONE ENCOUNTER
Returned phone call to patient she states fever started 2 days ago, fever was between 101- 102. Fever broke last night has a sore throat, cough which is not productive she has been doing neb treatments every 6 hours and using her albuterol every 4 hours.

## 2019-06-06 NOTE — TELEPHONE ENCOUNTER
HAD A FEVER, COUGHS ALL NIGHT, SWOLLEN THROAT, FEELS IT IN HER CHEST, USING NEBULIZER & INHALERS, NOT SURE IF SHE NEEDS PREDNISONE?  BP ELEV AS WELL, HAS HEART MONITOR, CALL PT

## 2019-06-10 ENCOUNTER — TELEPHONE (OUTPATIENT)
Dept: FAMILY MEDICINE CLINIC | Facility: CLINIC | Age: 59
End: 2019-06-10

## 2019-06-17 ENCOUNTER — TELEPHONE (OUTPATIENT)
Dept: FAMILY MEDICINE CLINIC | Facility: CLINIC | Age: 59
End: 2019-06-17

## 2019-06-17 RX ORDER — ATORVASTATIN CALCIUM 40 MG/1
40 TABLET, FILM COATED ORAL NIGHTLY
Qty: 90 TABLET | Refills: 0 | Status: SHIPPED | OUTPATIENT
Start: 2019-06-17 | End: 2019-12-10

## 2019-06-17 RX ORDER — ATORVASTATIN CALCIUM 40 MG/1
40 TABLET, FILM COATED ORAL NIGHTLY
COMMUNITY
Start: 2019-05-30 | End: 2019-06-17

## 2019-06-17 NOTE — TELEPHONE ENCOUNTER
----- Message from Domi Vidal MA sent at 6/17/2019  2:52 PM CDT -----  PATIENT RETURNING NURSE CALL    Pt states the medication was changed from Simvastatin to Atorvastatin due to the Simvastatin interacted with her Plavix.  So they changed her medicati

## 2019-06-17 NOTE — TELEPHONE ENCOUNTER
She needs a #90 day of the atorvastatin today because her mother is passing away and she has to leave first thing in the morning.    [facility]

## 2019-06-17 NOTE — TELEPHONE ENCOUNTER
Last office visit 5-28-19  Last refill Simvastatin 11-29-18 #90 with 1  Labs done 8-7-18   [phone] (home) Left message for patient to call back. Need to confirm she wants SIMvastatin, not ATORvastatin.   Also needs labs in August.

## 2019-06-19 NOTE — TELEPHONE ENCOUNTER
I tried to call the pt and the phone rings once then states to enter in the 10 digit number for the person I am trying to reach. I spoke to the pt yesterday regarding a medication and she did not mention the symptoms in this message.  tanesha

## 2019-06-27 ENCOUNTER — TELEPHONE (OUTPATIENT)
Dept: FAMILY MEDICINE CLINIC | Facility: CLINIC | Age: 59
End: 2019-06-27

## 2019-06-27 NOTE — TELEPHONE ENCOUNTER
Mammogram ordered 5/28/19. MRI Shoulder ordered 5/29/19. Patient has not scheduled. Please call patient to follow up.

## 2019-07-01 NOTE — TELEPHONE ENCOUNTER
Spoke with pt who states that she she is setting up MRI and apologized as she has been out of state helping her mother

## 2019-07-11 ENCOUNTER — TELEPHONE (OUTPATIENT)
Dept: FAMILY MEDICINE CLINIC | Facility: CLINIC | Age: 59
End: 2019-07-11

## 2019-07-11 NOTE — TELEPHONE ENCOUNTER
JAGDISH Manzano Nurse   Caller: Unspecified (Today, 11:36 AM)             PT RETURNING CALL. SHE STATES SHE WAS TO HAVE MRI BUT HAS BEEN OUT OF TOWN, SHE WOULD LIKE THE ORDER TO GO TO [facility] FOR OPEN MRI      Order faxed.

## 2019-07-11 NOTE — TELEPHONE ENCOUNTER
LUZ JUST RECEIVED ORDERS FOR THIS PATIENT FOR MRI RT SHOULDER  DX SUSPECT LABRAL TEAR AND LABRUM TEAR. RADIOLOGIST RECOMMENDS MRI ARTHROGRAM TO BE DONE TO DETECT ANY TEARS.    PLEASE ADVISE       CAN FAX ORDER TO Fax [phone]

## 2019-07-16 NOTE — TELEPHONE ENCOUNTER
Per Dr Singh Section ok to order the MRI Arthrogram. Order faxed.  chrisw    After placing the order it gave me a hard stop due to the pt's allergy to contrast. Dr Singh Section is asking if we can call radiology and see if they would be ok just doing the MRI right now to l

## 2019-07-23 ENCOUNTER — TELEPHONE (OUTPATIENT)
Dept: FAMILY MEDICINE CLINIC | Facility: CLINIC | Age: 59
End: 2019-07-23

## 2019-07-23 NOTE — TELEPHONE ENCOUNTER
PT HAD AN MRI DONE THIS AM FOR HER RIGHT SHOULDER, WHO DOES DR BEST WANT HER TO SEE - ORTHOPEDIC SURGEON? IS IT DR Kory Klein?  PLEASE CALL

## 2019-07-30 ENCOUNTER — TELEPHONE (OUTPATIENT)
Dept: FAMILY MEDICINE CLINIC | Facility: CLINIC | Age: 59
End: 2019-07-30

## 2019-07-31 NOTE — TELEPHONE ENCOUNTER
Margarita's MRI of her shoulder shows an extensive anterior  labral tear from 1 to 5 oclock position. She has a healing high grade partial thickness eve of supraspinatus tendon.     I recommend she follow up with Dr. Jass Radford or Dr. Giselle Gentile for further treatm

## 2019-07-31 NOTE — TELEPHONE ENCOUNTER
Melia Mims MD   Orthopedic Surgery   [facility]   Phone: [phone]     Nicole Tyson MD   Orthopedic Surgery, Sports Medicine   [facility]   Phone: [phone]     Brianda Fofana

## 2019-08-12 NOTE — TELEPHONE ENCOUNTER
Future Appointments   Date Time Provider Jania Montoya   8/20/2019  8:30 AM Jorgito Ingram, DO [facility] [place]   9/6/2019  8:30 AM Zachariah Tamez, DO [facility].

## 2019-08-19 NOTE — H&P
HPI:   Brian Barber is a 60year old female who presents for a complete physical exam. Symptoms: is menopausal. Patient complains of needing physical .  Had NSTEMI 1/2019 with stent and balloon on plavix for 1 year. Sees  Dr. David Castillo and Lennette Bosworth.  BP stable legs 120 mL Rfl: 0   hydrochlorothiazide 25 MG Oral Tab Take 1 tablet (25 mg total) by mouth daily. Disp: 90 tablet Rfl: 0   atorvastatin 40 MG Oral Tab Take 1 tablet (40 mg total) by mouth nightly.  Disp: 90 tablet Rfl: 0   ARMOUR THYROID 90 MG Oral Tab Take 1 (RLS)    • Symptomatic menopausal or female climacteric states 2/7/2009   • Thyroid disease    • Ulcer    • Vasomotor symptoms due to menopause       Past Surgical History:   Procedure Laterality Date   • ANGIOPLASTY (CORONARY)     • ARTHROSCOPY KNEE LEFT allergy or asthma asthma flares up this time of year. EXAM:   /86   Pulse 76   Temp 98.2 °F (36.8 °C) (Temporal)   Resp 20   Ht 67\"   Wt 250 lb 6 oz   LMP  (LMP Unknown)   BMI 39.21 kg/m²   Body mass index is 39.21 kg/m².    GENERAL: well develope for vaccination  - discussed prevnar  - ZOSTER VACC RECOMBINANT IM Dre Blas Peoples 83 will put on wait list.     2. Well woman exam with routine gynecological exam  - colonoscopy due - referred to Dr. Sonido Taylor  -  monthly SBE  - work on weight loss  - mammogram  - vaccines

## 2019-08-20 ENCOUNTER — OFFICE VISIT (OUTPATIENT)
Dept: FAMILY MEDICINE CLINIC | Facility: CLINIC | Age: 59
End: 2019-08-20
Payer: COMMERCIAL

## 2019-08-20 ENCOUNTER — LAB ENCOUNTER (OUTPATIENT)
Dept: LAB | Age: 59
End: 2019-08-20
Attending: FAMILY MEDICINE
Payer: COMMERCIAL

## 2019-08-20 VITALS
WEIGHT: 250.38 LBS | HEIGHT: 67 IN | TEMPERATURE: 98 F | RESPIRATION RATE: 20 BRPM | BODY MASS INDEX: 39.3 KG/M2 | SYSTOLIC BLOOD PRESSURE: 136 MMHG | HEART RATE: 76 BPM | DIASTOLIC BLOOD PRESSURE: 86 MMHG

## 2019-08-20 DIAGNOSIS — J45.21 MILD INTERMITTENT ASTHMA WITH EXACERBATION: ICD-10-CM

## 2019-08-20 DIAGNOSIS — Z12.31 ENCOUNTER FOR SCREENING MAMMOGRAM FOR BREAST CANCER: ICD-10-CM

## 2019-08-20 DIAGNOSIS — Z23 NEED FOR VACCINATION: ICD-10-CM

## 2019-08-20 DIAGNOSIS — I25.10 CORONARY ARTERIOSCLEROSIS: ICD-10-CM

## 2019-08-20 DIAGNOSIS — E03.9 HYPOTHYROIDISM, UNSPECIFIED TYPE: ICD-10-CM

## 2019-08-20 DIAGNOSIS — G90.521 REFLEX SYMPATHETIC DYSTROPHY OF RIGHT LOWER EXTREMITY: ICD-10-CM

## 2019-08-20 DIAGNOSIS — I10 ESSENTIAL HYPERTENSION: ICD-10-CM

## 2019-08-20 DIAGNOSIS — Z01.419 WELL WOMAN EXAM WITH ROUTINE GYNECOLOGICAL EXAM: ICD-10-CM

## 2019-08-20 DIAGNOSIS — K21.9 GASTROESOPHAGEAL REFLUX DISEASE, ESOPHAGITIS PRESENCE NOT SPECIFIED: ICD-10-CM

## 2019-08-20 DIAGNOSIS — Z01.419 WELL WOMAN EXAM WITH ROUTINE GYNECOLOGICAL EXAM: Primary | ICD-10-CM

## 2019-08-20 DIAGNOSIS — E66.9 OBESITY (BMI 35.0-39.9 WITHOUT COMORBIDITY): ICD-10-CM

## 2019-08-20 DIAGNOSIS — I21.4 NSTEMI (NON-ST ELEVATED MYOCARDIAL INFARCTION) (HCC): ICD-10-CM

## 2019-08-20 DIAGNOSIS — R60.9 PERIPHERAL EDEMA: ICD-10-CM

## 2019-08-20 DIAGNOSIS — E03.9 ACQUIRED HYPOTHYROIDISM: ICD-10-CM

## 2019-08-20 DIAGNOSIS — E78.00 PURE HYPERCHOLESTEROLEMIA: ICD-10-CM

## 2019-08-20 DIAGNOSIS — F32.A DEPRESSION, UNSPECIFIED DEPRESSION TYPE: ICD-10-CM

## 2019-08-20 DIAGNOSIS — M24.111 LABRAL TEAR OF SHOULDER, DEGENERATIVE, RIGHT: ICD-10-CM

## 2019-08-20 DIAGNOSIS — J30.89 SEASONAL ALLERGIC RHINITIS DUE TO OTHER ALLERGIC TRIGGER: ICD-10-CM

## 2019-08-20 DIAGNOSIS — Z12.11 COLON CANCER SCREENING: ICD-10-CM

## 2019-08-20 DIAGNOSIS — Z12.31 VISIT FOR SCREENING MAMMOGRAM: ICD-10-CM

## 2019-08-20 DIAGNOSIS — M31.0 LEUCOCYTOCLASTIC VASCULITIS (HCC): ICD-10-CM

## 2019-08-20 LAB
ALBUMIN SERPL-MCNC: 3.8 G/DL (ref 3.4–5)
ALBUMIN/GLOB SERPL: 1 {RATIO} (ref 1–2)
ALP LIVER SERPL-CCNC: 100 U/L (ref 46–118)
ALT SERPL-CCNC: 21 U/L (ref 13–56)
ANION GAP SERPL CALC-SCNC: 4 MMOL/L (ref 0–18)
AST SERPL-CCNC: 18 U/L (ref 15–37)
BASOPHILS # BLD AUTO: 0.05 X10(3) UL (ref 0–0.2)
BASOPHILS NFR BLD AUTO: 1.1 %
BILIRUB SERPL-MCNC: 0.5 MG/DL (ref 0.1–2)
BUN BLD-MCNC: 9 MG/DL (ref 7–18)
BUN/CREAT SERPL: 10.1 (ref 10–20)
CALCIUM BLD-MCNC: 8.9 MG/DL (ref 8.5–10.1)
CHLORIDE SERPL-SCNC: 110 MMOL/L (ref 98–112)
CHOLEST SMN-MCNC: 169 MG/DL (ref ?–200)
CK SERPL-CCNC: 278 U/L (ref 26–192)
CO2 SERPL-SCNC: 27 MMOL/L (ref 21–32)
CREAT BLD-MCNC: 0.89 MG/DL (ref 0.55–1.02)
DEPRECATED RDW RBC AUTO: 40.6 FL (ref 35.1–46.3)
EOSINOPHIL # BLD AUTO: 0.13 X10(3) UL (ref 0–0.7)
EOSINOPHIL NFR BLD AUTO: 2.9 %
ERYTHROCYTE [DISTWIDTH] IN BLOOD BY AUTOMATED COUNT: 13.3 % (ref 11–15)
GLOBULIN PLAS-MCNC: 3.8 G/DL (ref 2.8–4.4)
GLUCOSE BLD-MCNC: 96 MG/DL (ref 70–99)
HCT VFR BLD AUTO: 41.6 % (ref 35–48)
HDLC SERPL-MCNC: 42 MG/DL (ref 40–59)
HGB BLD-MCNC: 13.4 G/DL (ref 12–16)
IMM GRANULOCYTES # BLD AUTO: 0.01 X10(3) UL (ref 0–1)
IMM GRANULOCYTES NFR BLD: 0.2 %
LDLC SERPL CALC-MCNC: 99 MG/DL (ref ?–100)
LYMPHOCYTES # BLD AUTO: 1.43 X10(3) UL (ref 1–4)
LYMPHOCYTES NFR BLD AUTO: 32.1 %
M PROTEIN MFR SERPL ELPH: 7.6 G/DL (ref 6.4–8.2)
MCH RBC QN AUTO: 26.9 PG (ref 26–34)
MCHC RBC AUTO-ENTMCNC: 32.2 G/DL (ref 31–37)
MCV RBC AUTO: 83.4 FL (ref 80–100)
MONOCYTES # BLD AUTO: 0.5 X10(3) UL (ref 0.1–1)
MONOCYTES NFR BLD AUTO: 11.2 %
NEUTROPHILS # BLD AUTO: 2.34 X10 (3) UL (ref 1.5–7.7)
NEUTROPHILS # BLD AUTO: 2.34 X10(3) UL (ref 1.5–7.7)
NEUTROPHILS NFR BLD AUTO: 52.5 %
NONHDLC SERPL-MCNC: 127 MG/DL (ref ?–130)
OSMOLALITY SERPL CALC.SUM OF ELEC: 291 MOSM/KG (ref 275–295)
PLATELET # BLD AUTO: 200 10(3)UL (ref 150–450)
POTASSIUM SERPL-SCNC: 4.1 MMOL/L (ref 3.5–5.1)
RBC # BLD AUTO: 4.99 X10(6)UL (ref 3.8–5.3)
SODIUM SERPL-SCNC: 141 MMOL/L (ref 136–145)
T4 FREE SERPL-MCNC: 0.7 NG/DL (ref 0.8–1.7)
TRIGL SERPL-MCNC: 141 MG/DL (ref 30–149)
TSI SER-ACNC: 6.15 MIU/ML (ref 0.36–3.74)
VLDLC SERPL CALC-MCNC: 28 MG/DL (ref 0–30)
WBC # BLD AUTO: 4.5 X10(3) UL (ref 4–11)

## 2019-08-20 PROCEDURE — 99213 OFFICE O/P EST LOW 20 MIN: CPT | Performed by: FAMILY MEDICINE

## 2019-08-20 PROCEDURE — 80050 GENERAL HEALTH PANEL: CPT | Performed by: FAMILY MEDICINE

## 2019-08-20 PROCEDURE — 82550 ASSAY OF CK (CPK): CPT | Performed by: FAMILY MEDICINE

## 2019-08-20 PROCEDURE — 80061 LIPID PANEL: CPT | Performed by: FAMILY MEDICINE

## 2019-08-20 PROCEDURE — 36415 COLL VENOUS BLD VENIPUNCTURE: CPT | Performed by: FAMILY MEDICINE

## 2019-08-20 PROCEDURE — 99396 PREV VISIT EST AGE 40-64: CPT | Performed by: FAMILY MEDICINE

## 2019-08-20 PROCEDURE — 84439 ASSAY OF FREE THYROXINE: CPT | Performed by: FAMILY MEDICINE

## 2019-08-20 RX ORDER — IPRATROPIUM BROMIDE AND ALBUTEROL SULFATE 2.5; .5 MG/3ML; MG/3ML
3 SOLUTION RESPIRATORY (INHALATION) 4 TIMES DAILY PRN
Qty: 120 ML | Refills: 0 | Status: SHIPPED | OUTPATIENT
Start: 2019-08-20

## 2019-08-20 RX ORDER — HYDROCHLOROTHIAZIDE 25 MG/1
25 TABLET ORAL DAILY
Qty: 90 TABLET | Refills: 0 | Status: SHIPPED | OUTPATIENT
Start: 2019-08-20 | End: 2020-10-23

## 2019-08-20 NOTE — PATIENT INSTRUCTIONS
Coronary Angioplasty  Your healthcare team will talk to you about your heart problem and explain how angioplasty can help. Angioplasty relieves symptoms of coronary artery disease by improving blood flow to your heart.  Chest pain or angina can be caused · A member of the healthcare team will tell you how long to lie down and keep the insertion site still. The amount of time may depend on whether a closure device such as a stitch or collagen plug was used to close the opening that was made in your artery. A fatty material (plaque) can build up in your arteries. This makes it harder for blood to flow through them. A blood clot can then form on the plaque. This may block the artery, cutting off blood flow.  This can cause conditions such as coronary artery dis · Keep track of what you take. A pillbox with days of the week can help, especially if you take several medicines. Or use a list or chart to keep track. When to call your healthcare provider  Side effects of aspirin are not usually serious.  If you do ha

## 2019-09-17 ENCOUNTER — TELEPHONE (OUTPATIENT)
Dept: FAMILY MEDICINE CLINIC | Facility: CLINIC | Age: 59
End: 2019-09-17

## 2019-09-17 ENCOUNTER — OFFICE VISIT (OUTPATIENT)
Dept: FAMILY MEDICINE CLINIC | Facility: CLINIC | Age: 59
End: 2019-09-17
Payer: COMMERCIAL

## 2019-09-17 VITALS
TEMPERATURE: 99 F | RESPIRATION RATE: 24 BRPM | HEART RATE: 79 BPM | DIASTOLIC BLOOD PRESSURE: 94 MMHG | BODY MASS INDEX: 38.01 KG/M2 | SYSTOLIC BLOOD PRESSURE: 154 MMHG | OXYGEN SATURATION: 98 % | HEIGHT: 68 IN | WEIGHT: 250.81 LBS

## 2019-09-17 DIAGNOSIS — J44.1 COPD WITH ACUTE EXACERBATION (HCC): Primary | ICD-10-CM

## 2019-09-17 PROCEDURE — 99214 OFFICE O/P EST MOD 30 MIN: CPT | Performed by: FAMILY MEDICINE

## 2019-09-17 RX ORDER — DOXYCYCLINE HYCLATE 100 MG/1
100 CAPSULE ORAL 2 TIMES DAILY
Qty: 20 CAPSULE | Refills: 0 | Status: SHIPPED | OUTPATIENT
Start: 2019-09-17 | End: 2019-09-27

## 2019-09-17 RX ORDER — BENZONATATE 100 MG/1
100 CAPSULE ORAL 3 TIMES DAILY PRN
Qty: 15 CAPSULE | Refills: 0 | Status: SHIPPED | OUTPATIENT
Start: 2019-09-17 | End: 2020-06-09 | Stop reason: ALTCHOICE

## 2019-09-17 NOTE — PATIENT INSTRUCTIONS
albuterol nebs every 3-4 hours  Doxycyline 100 mg bid x 10 day  Tessalon perles up to 3 times a day  Saline hourly  Prednisone 20 mg bid x 5 days

## 2019-09-17 NOTE — PROGRESS NOTES
HPI:   Tiffany Lackey is a 60year old female who presents for upper respiratory symptoms for  4  days. Patient reports sore throat, congestion, low grade fever feels short of breath. Exposed to several patientts and co workers sick with [facility].         Current HYPERTENSION    • Hypertension    • HYPOTHYROIDISM    • IBS    • NSTEMI (non-ST elevated myocardial infarction) ([facility] [address] 1/16/2018   • Obesity, unspecified    • Osteoarthrosis, unspecified whether generalized or localized, unspecified site    • Other road veh headaches    EXAM:   BP (!) 154/94 (BP Location: Right arm, Patient Position: Sitting, Cuff Size: adult)   Pulse 79   Temp 98.8 °F (37.1 °C) (Temporal)   Resp 24   Ht 68\"   Wt 250 lb 12.8 oz   LMP  (LMP Unknown)   SpO2 98%   Breastfeeding?  No   BMI 38.13

## 2019-09-17 NOTE — TELEPHONE ENCOUNTER
I called the pt to get her symptoms. Pt states Saturday night she felt horrible. Fever and chills. Pt was also nauseated. She now has a tight cough, slight wheezing, chest feels heavy, hard to catch her breath. Dr Maryam Moore would like to see the pt today.  Davion

## 2019-10-07 ENCOUNTER — TELEPHONE (OUTPATIENT)
Dept: FAMILY MEDICINE CLINIC | Facility: CLINIC | Age: 59
End: 2019-10-07

## 2019-10-07 RX ORDER — THYROID,PORK 90 MG
90 TABLET ORAL
Qty: 90 TABLET | Refills: 0 | Status: SHIPPED | OUTPATIENT
Start: 2019-10-07 | End: 2020-10-23 | Stop reason: ALTCHOICE

## 2019-11-18 ENCOUNTER — APPOINTMENT (OUTPATIENT)
Dept: OTHER | Age: 59
End: 2019-11-18
Attending: FAMILY MEDICINE

## 2019-12-03 ENCOUNTER — OFFICE VISIT (OUTPATIENT)
Dept: FAMILY MEDICINE CLINIC | Facility: CLINIC | Age: 59
End: 2019-12-03
Payer: COMMERCIAL

## 2019-12-03 VITALS — TEMPERATURE: 98 F | BODY MASS INDEX: 38 KG/M2 | HEART RATE: 76 BPM | WEIGHT: 250 LBS | RESPIRATION RATE: 20 BRPM

## 2019-12-03 DIAGNOSIS — J06.9 VIRAL UPPER RESPIRATORY TRACT INFECTION: ICD-10-CM

## 2019-12-03 DIAGNOSIS — J02.9 PHARYNGITIS, UNSPECIFIED ETIOLOGY: Primary | ICD-10-CM

## 2019-12-03 PROCEDURE — 99213 OFFICE O/P EST LOW 20 MIN: CPT | Performed by: FAMILY MEDICINE

## 2019-12-03 PROCEDURE — 87880 STREP A ASSAY W/OPTIC: CPT | Performed by: FAMILY MEDICINE

## 2019-12-03 NOTE — PROGRESS NOTES
HPI:   Esther Jacques is a 60year old female who presents for upper respiratory symptoms for  4  days. Patient reports sore throat, sore throat only at the beginning of sx's throat feels raw. . Exposed to son who has strep. Has frontal headache to day.  + c • Displacement of cervical intervertebral disc without myelopathy 5/9/2007   • GERD    • GERD (gastroesophageal reflux disease) 3/7/2017   • Hypercholesterolemia    • HYPERTENSION    • Hypertension    • HYPOTHYROIDISM    • IBS    • NSTEMI (non-ST elevate discharge  HEENT: congested  LUNGS: no wheeze  CARDIOVASCULAR no palpitations  GI: no emesis  NEURO: sinus  headaches    EXAM:   Pulse 76   Temp 98.4 °F (36.9 °C) (Temporal)   Resp 20   Wt 250 lb (113.4 kg)   LMP  (LMP Unknown)   BMI 38.01 kg/m²   GENERAL:

## 2019-12-05 ENCOUNTER — TELEPHONE (OUTPATIENT)
Dept: FAMILY MEDICINE CLINIC | Facility: CLINIC | Age: 59
End: 2019-12-05

## 2019-12-05 RX ORDER — PREDNISONE 20 MG/1
40 TABLET ORAL DAILY
Qty: 14 TABLET | Refills: 0 | Status: SHIPPED | OUTPATIENT
Start: 2019-12-05 | End: 2019-12-12

## 2019-12-05 NOTE — TELEPHONE ENCOUNTER
Patient seen Dr Shania Serrano 2 days ago. Chest is tight and has some sob. Would like some prednisone to help with this. Please call patient back to advise if  can call some prednisone in for her.

## 2019-12-05 NOTE — TELEPHONE ENCOUNTER
Was seen 12/3. Patient under the understanding she was to call if sx worsen and Dr. Karren Halsted would order prednisone. Reports chest feeling tight, current meds not helping. Has a long hx of asthma.

## 2019-12-06 ENCOUNTER — TELEPHONE (OUTPATIENT)
Dept: FAMILY MEDICINE CLINIC | Facility: CLINIC | Age: 59
End: 2019-12-06

## 2019-12-06 RX ORDER — AZITHROMYCIN 250 MG/1
TABLET, FILM COATED ORAL
Qty: 6 TABLET | Refills: 0 | Status: SHIPPED | OUTPATIENT
Start: 2019-12-06 | End: 2020-06-09 | Stop reason: ALTCHOICE

## 2019-12-06 NOTE — TELEPHONE ENCOUNTER
Patient states that Dr Zenon Spatz ordered Prednisone for her. Not any better this morning. Everything is in her chest now. Burning in her chest when she coughs. Having family over this weekend and she has to go to work today.   Is there anything else Dr Antonio Koo ca

## 2019-12-06 NOTE — TELEPHONE ENCOUNTER
Pt states the cough is productive and wet. Feels it's in her chest now. Has to sit in recliner chair at night to sleep and has been using inhaler every 4 hrs.  Advised pt that there is a viral respiratory infection going around and it may last up to at [facility] [address]

## 2019-12-10 RX ORDER — ATORVASTATIN CALCIUM 40 MG/1
TABLET, FILM COATED ORAL
Qty: 90 TABLET | Refills: 0 | Status: SHIPPED | OUTPATIENT
Start: 2019-12-10 | End: 2020-03-04

## 2019-12-10 NOTE — TELEPHONE ENCOUNTER
Last refill #90 on 6/17/19  Last office visit on 12/3/19  No future appointments.   BP Readings from Last 3 Encounters:  09/17/19 : (!) 154/94  08/20/19 : 136/86  05/28/19 : 160/90

## 2019-12-16 ENCOUNTER — TELEPHONE (OUTPATIENT)
Dept: FAMILY MEDICINE CLINIC | Facility: CLINIC | Age: 59
End: 2019-12-16

## 2019-12-16 NOTE — TELEPHONE ENCOUNTER
Pt was put on list 8/20/19. 1st Shingrix dose is available. Calling pt to make appt. Pt was unaware that she was on the Shingrix waiting list. She is able to get the vaccine through work.  Will take pt off of list.         MAYKEL Cole ----Also, pt

## 2020-03-04 RX ORDER — ATORVASTATIN CALCIUM 40 MG/1
TABLET, FILM COATED ORAL
Qty: 90 TABLET | Refills: 0 | Status: SHIPPED | OUTPATIENT
Start: 2020-03-04 | End: 2020-06-08

## 2020-03-04 NOTE — TELEPHONE ENCOUNTER
LOV 8/20/19 Joi The patient is asked to return for CPX in 1 year.             12/3/19 acute    LAST LAB 8/20/19    LAST RX 12/10/19 90 tabs 0 refills    Next OV No future appointments.       PROTOCOL    Cholesterol Medication Protocol Passed3/4 3:05 PM

## 2020-03-17 DIAGNOSIS — I10 ESSENTIAL (PRIMARY) HYPERTENSION: ICD-10-CM

## 2020-03-17 RX ORDER — CARVEDILOL 12.5 MG/1
TABLET ORAL
Qty: 60 TABLET | Refills: 0 | Status: SHIPPED | OUTPATIENT
Start: 2020-03-17 | End: 2020-03-18

## 2020-03-17 RX ORDER — CLOPIDOGREL BISULFATE 75 MG/1
TABLET ORAL
Qty: 90 TABLET | Refills: 3 | OUTPATIENT
Start: 2020-03-17

## 2020-03-17 NOTE — TELEPHONE ENCOUNTER
Patient states is no longer taking Plavix. Requesting refill of coreg. LOV  12/3/19 acute            8/20/19   The patient is asked to return for CPX in 1 year.     LAST LAB    LAST RX  Carvediol                   Clopidogrel    Next OV    PROTOCOL

## 2020-03-18 ENCOUNTER — TELEPHONE (OUTPATIENT)
Dept: FAMILY MEDICINE CLINIC | Facility: CLINIC | Age: 60
End: 2020-03-18

## 2020-03-18 DIAGNOSIS — I10 ESSENTIAL (PRIMARY) HYPERTENSION: ICD-10-CM

## 2020-03-18 RX ORDER — CARVEDILOL 12.5 MG/1
TABLET ORAL
Qty: 90 TABLET | Refills: 0 | Status: SHIPPED | OUTPATIENT
Start: 2020-03-18 | End: 2020-03-23

## 2020-03-23 ENCOUNTER — TELEPHONE (OUTPATIENT)
Dept: FAMILY MEDICINE CLINIC | Facility: CLINIC | Age: 60
End: 2020-03-23

## 2020-03-23 DIAGNOSIS — I10 ESSENTIAL (PRIMARY) HYPERTENSION: ICD-10-CM

## 2020-03-23 RX ORDER — CARVEDILOL 12.5 MG/1
TABLET ORAL
Qty: 180 TABLET | Refills: 0 | Status: SHIPPED | OUTPATIENT
Start: 2020-03-23 | End: 2020-06-19

## 2020-03-23 RX ORDER — CARVEDILOL 12.5 MG/1
TABLET ORAL
Qty: 90 TABLET | Refills: 0 | Status: SHIPPED | OUTPATIENT
Start: 2020-03-23 | End: 2020-03-23

## 2020-03-23 NOTE — TELEPHONE ENCOUNTER
Last refill: 03/18/20  Qty: 90  W/ 0 refills  Last ov: 12/03/19    Requested Prescriptions     Pending Prescriptions Disp Refills   • carvedilol 12.5 MG Oral Tab 90 tablet 0     Sig: TAKE 1 TABLET BY MOUTH TWO TIMES DAILY. BEST IF TAKEN WITH FOOD.      No f

## 2020-05-29 ENCOUNTER — TELEPHONE (OUTPATIENT)
Dept: FAMILY MEDICINE CLINIC | Facility: CLINIC | Age: 60
End: 2020-05-29

## 2020-05-29 DIAGNOSIS — Z01.419 WELL WOMAN EXAM: Primary | ICD-10-CM

## 2020-05-29 NOTE — TELEPHONE ENCOUNTER
Last labs done 8/20/19; CBC,CK,Lipid,CMP,TSH. Last OV w/Dr Heredia Mantle 12/3/19-COPD. Due for annual physical 8/20/20 or after.

## 2020-05-29 NOTE — TELEPHONE ENCOUNTER
Patient advised. She said that she was hoping to get labs done before she come in so she can discuss with her. Can you order these ahead of time. She made an appointment for lab work 6/9/20.

## 2020-06-08 RX ORDER — ATORVASTATIN CALCIUM 40 MG/1
TABLET, FILM COATED ORAL
Qty: 90 TABLET | Refills: 0 | Status: SHIPPED | OUTPATIENT
Start: 2020-06-08 | End: 2020-09-11

## 2020-06-08 NOTE — TELEPHONE ENCOUNTER
Last Office Visit: 12/3/19  Last Refill: 3/4/20 #90 no refill  Last Labs: 8/20/19, scheduled for labs 6/9/20

## 2020-06-09 ENCOUNTER — OFFICE VISIT (OUTPATIENT)
Dept: FAMILY MEDICINE CLINIC | Facility: CLINIC | Age: 60
End: 2020-06-09
Payer: COMMERCIAL

## 2020-06-09 ENCOUNTER — APPOINTMENT (OUTPATIENT)
Dept: LAB | Age: 60
End: 2020-06-09
Attending: FAMILY MEDICINE
Payer: COMMERCIAL

## 2020-06-09 ENCOUNTER — HOSPITAL ENCOUNTER (OUTPATIENT)
Dept: GENERAL RADIOLOGY | Age: 60
Discharge: HOME | End: 2020-06-09
Attending: FAMILY MEDICINE
Payer: COMMERCIAL

## 2020-06-09 VITALS
BODY MASS INDEX: 38 KG/M2 | SYSTOLIC BLOOD PRESSURE: 160 MMHG | DIASTOLIC BLOOD PRESSURE: 108 MMHG | TEMPERATURE: 98 F | HEART RATE: 71 BPM | HEIGHT: 68 IN | RESPIRATION RATE: 20 BRPM | OXYGEN SATURATION: 97 %

## 2020-06-09 DIAGNOSIS — M25.571 ACUTE RIGHT ANKLE PAIN: ICD-10-CM

## 2020-06-09 DIAGNOSIS — M25.571 ACUTE RIGHT ANKLE PAIN: Primary | ICD-10-CM

## 2020-06-09 DIAGNOSIS — I10 ESSENTIAL HYPERTENSION: ICD-10-CM

## 2020-06-09 DIAGNOSIS — Z01.419 WELL WOMAN EXAM: ICD-10-CM

## 2020-06-09 DIAGNOSIS — M10.071 ACUTE IDIOPATHIC GOUT OF RIGHT ANKLE: ICD-10-CM

## 2020-06-09 DIAGNOSIS — M10.471 OTHER SECONDARY ACUTE GOUT OF RIGHT FOOT: ICD-10-CM

## 2020-06-09 PROCEDURE — 80061 LIPID PANEL: CPT | Performed by: FAMILY MEDICINE

## 2020-06-09 PROCEDURE — 36415 COLL VENOUS BLD VENIPUNCTURE: CPT | Performed by: FAMILY MEDICINE

## 2020-06-09 PROCEDURE — 84550 ASSAY OF BLOOD/URIC ACID: CPT | Performed by: FAMILY MEDICINE

## 2020-06-09 PROCEDURE — 99214 OFFICE O/P EST MOD 30 MIN: CPT | Performed by: FAMILY MEDICINE

## 2020-06-09 PROCEDURE — 73610 X-RAY EXAM OF ANKLE: CPT | Performed by: FAMILY MEDICINE

## 2020-06-09 PROCEDURE — 84439 ASSAY OF FREE THYROXINE: CPT | Performed by: FAMILY MEDICINE

## 2020-06-09 PROCEDURE — 80053 COMPREHEN METABOLIC PANEL: CPT | Performed by: FAMILY MEDICINE

## 2020-06-09 PROCEDURE — 82550 ASSAY OF CK (CPK): CPT | Performed by: FAMILY MEDICINE

## 2020-06-09 PROCEDURE — 84443 ASSAY THYROID STIM HORMONE: CPT | Performed by: FAMILY MEDICINE

## 2020-06-09 RX ORDER — INDOMETHACIN 50 MG/1
50 CAPSULE ORAL 2 TIMES DAILY WITH MEALS
Qty: 14 CAPSULE | Refills: 0 | Status: SHIPPED | OUTPATIENT
Start: 2020-06-09 | End: 2020-06-16

## 2020-06-09 RX ORDER — TORSEMIDE 20 MG/1
20 TABLET ORAL DAILY
Qty: 90 TABLET | Refills: 0 | Status: SHIPPED | OUTPATIENT
Start: 2020-06-09 | End: 2020-09-10

## 2020-06-09 RX ORDER — PREDNISONE 20 MG/1
20 TABLET ORAL 2 TIMES DAILY
Qty: 10 TABLET | Refills: 0 | Status: SHIPPED | OUTPATIENT
Start: 2020-06-09 | End: 2020-06-14

## 2020-06-09 NOTE — PATIENT INSTRUCTIONS
Treating Gout Attacks     Raising the joint above the level of your heart can help reduce gout symptoms. Gout is a disease that affects the joints. It is caused by excess uric acid in your blood that may lead to crystals forming in your joints.  Left ? Shellfish (lobster, crab, shrimp, scallop, mussel)  ? Certain fish (anchovy, sardine, herring, mackerel)  · Take any medicines prescribed by your healthcare provider. · Lose weight if you need to. · Reduce high fructose corn syrup in meals and drinks. · Contact your healthcare provider for advice and therapy options at the early stages of a gout flare. Treating the flare right away can prevent it from getting worse. · Rest painful joints.  If gout affects the joints of your foot or leg, you may want to · Protect the joint from injury. Wear good fitting socks and shoes. Injury can trigger a gout attack.     Follow-up care  Follow up with your healthcare provider, or as advised.   When to seek medical advice  Call your healthcare provider if you have any of

## 2020-06-09 NOTE — PROGRESS NOTES
Mario Hillman is a 63year old female. HPI:   rigth foot and ankle are swollen and tender. Was in the garden and felt an acute pain to foot and ankle. Not sure what she did. This occurred 2 weeks. More tender and more painful.  Having a bed sheet on the fo without myelopathy 5/9/2007   • GERD    • GERD (gastroesophageal reflux disease) 3/7/2017   • Hypercholesterolemia    • HYPERTENSION    • Hypertension    • HYPOTHYROIDISM    • IBS    • [facility]   • Obes 20 MG Oral Tab; Take 1 tablet (20 mg total) by mouth 2 (two) times daily for 5 days. Dispense: 10 tablet; Refill: 0  - indomethacin 50 MG Oral Cap; Take 1 capsule (50 mg total) by mouth 2 (two) times daily with meals for 7 days. Dispense: 14 capsule;  Ref

## 2020-06-12 DIAGNOSIS — E03.9 ACQUIRED HYPOTHYROIDISM: Primary | ICD-10-CM

## 2020-06-12 RX ORDER — THYROID,PORK 90 MG
90 TABLET ORAL
Qty: 90 TABLET | Refills: 3 | OUTPATIENT
Start: 2020-06-12

## 2020-06-12 RX ORDER — LEVOTHYROXINE SODIUM 0.1 MG/1
100 TABLET ORAL DAILY
Qty: 90 TABLET | Refills: 3 | Status: SHIPPED | OUTPATIENT
Start: 2020-06-12 | End: 2020-12-09

## 2020-06-12 NOTE — TELEPHONE ENCOUNTER
LOV:  6/9/2020     LAB:    6/9/2020   TSH 6.200    Free T40.8     LRX:    SULAIMAN THYROID 90 MG Oral Tab 90 tablet 0 refill 10/7/2019    NOV:     No future appointments. PROTOCOL:    Thyroid Supplements Protocol Failed6/12 12:15 AM   TSH value between 0.

## 2020-06-19 DIAGNOSIS — I10 ESSENTIAL (PRIMARY) HYPERTENSION: ICD-10-CM

## 2020-06-19 RX ORDER — CARVEDILOL 12.5 MG/1
TABLET ORAL
Qty: 180 TABLET | Refills: 0 | Status: SHIPPED | OUTPATIENT
Start: 2020-06-19 | End: 2020-09-11

## 2020-06-19 NOTE — TELEPHONE ENCOUNTER
LOV:  6/9/2020     LAB:    6/9/2020     LRX:    carvedilol 12.5 MG Oral Tab 180 tablet   0 refill 3/23/2020        NOV:   No future appointments.     PROTOCOL:    Hypertension Medications Protocol Passed6/19 12:13 AM   CMP or BMP in past 12 months    Last s

## 2020-09-10 DIAGNOSIS — I10 ESSENTIAL HYPERTENSION: ICD-10-CM

## 2020-09-10 RX ORDER — TORSEMIDE 20 MG/1
TABLET ORAL
Qty: 90 TABLET | Refills: 0 | Status: SHIPPED | OUTPATIENT
Start: 2020-09-10 | End: 2020-12-17

## 2020-09-10 NOTE — TELEPHONE ENCOUNTER
Last refill #90 on 6/9/2020  Last office visit on 6/9/2020  No future appointments.   BP Readings from Last 3 Encounters:  06/09/20 : (!) 160/108  09/17/19 : (!) 154/94  08/20/19 : 136/86    Labs current  Refilled per protocol

## 2020-09-11 DIAGNOSIS — I10 ESSENTIAL (PRIMARY) HYPERTENSION: ICD-10-CM

## 2020-09-11 RX ORDER — ATORVASTATIN CALCIUM 40 MG/1
TABLET, FILM COATED ORAL
Qty: 90 TABLET | Refills: 1 | Status: SHIPPED | OUTPATIENT
Start: 2020-09-11 | End: 2021-04-03

## 2020-09-11 RX ORDER — CARVEDILOL 12.5 MG/1
TABLET ORAL
Qty: 180 TABLET | Refills: 0 | Status: SHIPPED | OUTPATIENT
Start: 2020-09-11 | End: 2020-12-17

## 2020-10-23 ENCOUNTER — HOSPITAL ENCOUNTER (OUTPATIENT)
Dept: GENERAL RADIOLOGY | Age: 60
Discharge: HOME | End: 2020-10-23
Attending: FAMILY MEDICINE
Payer: COMMERCIAL

## 2020-10-23 ENCOUNTER — OFFICE VISIT (OUTPATIENT)
Dept: FAMILY MEDICINE CLINIC | Facility: CLINIC | Age: 60
End: 2020-10-23
Payer: COMMERCIAL

## 2020-10-23 ENCOUNTER — LAB ENCOUNTER (OUTPATIENT)
Dept: LAB | Age: 60
End: 2020-10-23
Attending: FAMILY MEDICINE
Payer: COMMERCIAL

## 2020-10-23 VITALS
TEMPERATURE: 98 F | SYSTOLIC BLOOD PRESSURE: 150 MMHG | HEART RATE: 64 BPM | DIASTOLIC BLOOD PRESSURE: 92 MMHG | RESPIRATION RATE: 12 BRPM

## 2020-10-23 DIAGNOSIS — E03.8 OTHER SPECIFIED HYPOTHYROIDISM: ICD-10-CM

## 2020-10-23 DIAGNOSIS — M24.812 INTERNAL DERANGEMENT OF LEFT SHOULDER: ICD-10-CM

## 2020-10-23 DIAGNOSIS — S50.02XA CONTUSION OF LEFT ELBOW, INITIAL ENCOUNTER: ICD-10-CM

## 2020-10-23 DIAGNOSIS — S80.01XA CONTUSION OF RIGHT KNEE, INITIAL ENCOUNTER: ICD-10-CM

## 2020-10-23 DIAGNOSIS — I10 ESSENTIAL HYPERTENSION: ICD-10-CM
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
## 2020-10-23 DIAGNOSIS — M79.645 PAIN IN FINGER OF LEFT HAND: ICD-10-CM

## 2020-10-23 DIAGNOSIS — Z12.31 BREAST CANCER SCREENING BY MAMMOGRAM: ICD-10-CM

## 2020-10-23 PROCEDURE — 73560 X-RAY EXAM OF KNEE 1 OR 2: CPT | Performed by: FAMILY MEDICINE

## 2020-10-23 PROCEDURE — 99214 OFFICE O/P EST MOD 30 MIN: CPT | Performed by: FAMILY MEDICINE

## 2020-10-23 PROCEDURE — 3077F SYST BP >= 140 MM HG: CPT | Performed by: FAMILY MEDICINE

## 2020-10-23 PROCEDURE — 84439 ASSAY OF FREE THYROXINE: CPT | Performed by: FAMILY MEDICINE

## 2020-10-23 PROCEDURE — 84443 ASSAY THYROID STIM HORMONE: CPT | Performed by: FAMILY MEDICINE

## 2020-10-23 PROCEDURE — 73030 X-RAY EXAM OF SHOULDER: CPT | Performed by: FAMILY MEDICINE

## 2020-10-23 PROCEDURE — 3080F DIAST BP >= 90 MM HG: CPT | Performed by: FAMILY MEDICINE

## 2020-10-23 PROCEDURE — 73140 X-RAY EXAM OF FINGER(S): CPT | Performed by: FAMILY MEDICINE

## 2020-10-23 PROCEDURE — 36415 COLL VENOUS BLD VENIPUNCTURE: CPT | Performed by: FAMILY MEDICINE

## 2020-10-23 RX ORDER — CYCLOBENZAPRINE HCL 10 MG
10 TABLET ORAL 3 TIMES DAILY
Qty: 30 TABLET | Refills: 1 | Status: SHIPPED | OUTPATIENT
Start: 2020-10-23 | End: 2020-11-12

## 2020-10-23 NOTE — PROGRESS NOTES
Brian Barber is a 63year old female. HPI:   Minesh Perez is here for evaluation after a fall Tuesday. Was in the garage. Was heading to work Tuesday am and hands full with bags and computer. was on 2nd stop from the bottom.  Landed on right knee on to cement fl of cervical intervertebral disc without myelopathy 5/9/2007   • GERD    • GERD (gastroesophageal reflux disease) 3/7/2017   • Hypercholesterolemia    • HYPERTENSION    • Hypertension    • HYPOTHYROIDISM    • IBS    • NSTEMI (non-ST elevated myocardial infa left shoulder unable to abduct past 50Degrees laterally. Flexion to 180 but can not raise past shoulder. No crepitance. ASSESSMENT AND PLAN:   1.  Fall (on) (from) other stairs and steps, initial encounter  - reviewed fall and events up until today  - h

## 2020-10-23 NOTE — PATIENT INSTRUCTIONS
Treatment for Bone Bruise (Bone Contusion)  A bone bruise is an injury to a bone that is less severe than a bone fracture. Bone bruises are fairly common. They can happen to people of all ages. Any type of bone in your body can get a bone bruise.  Other i © 1232-8797 [address]. [address] [facility], [facility] [place]. All rights reserved. This information is not intended as a substitute for professional medical care. Always follow your healthcare professional's instructions.         Karon Pinedo · Medicines. Prescription or over-the-counter medicines can help ease pain and swelling. NSAIDs (nonsteroidal anti-inflammatory drugs) are the most common medicines used. They may be taken as pills. Or they may be put on the skin as a gel, cream, or patch.

## 2020-10-27 RX ORDER — LEVOTHYROXINE SODIUM 0.03 MG/1
25 TABLET ORAL
Qty: 90 TABLET | Refills: 0 | Status: SHIPPED | OUTPATIENT
Start: 2020-10-27 | End: 2020-12-09

## 2020-11-28 ENCOUNTER — HOSPITAL ENCOUNTER (OUTPATIENT)
Dept: MRI IMAGING | Age: 60
Discharge: HOME | End: 2020-11-28
Attending: FAMILY MEDICINE
Payer: COMMERCIAL

## 2020-11-28 DIAGNOSIS — M24.812 INTERNAL DERANGEMENT OF LEFT SHOULDER: ICD-10-CM

## 2020-11-28 PROCEDURE — 73221 MRI JOINT UPR EXTREM W/O DYE: CPT | Performed by: FAMILY MEDICINE

## 2020-11-30 ENCOUNTER — TELEPHONE (OUTPATIENT)
Dept: FAMILY MEDICINE CLINIC | Facility: CLINIC | Age: 60
End: 2020-11-30

## 2020-11-30 NOTE — TELEPHONE ENCOUNTER
Spoke with patient and she did review MRI result in [facility] and your recommendation to see Dr. Shanti Weeks. Patient has seen Dr. Montana Rojas in the past. She is asking if you chose Dr. Shanti Weeks because of the type of injury she has.  I advised that she follow with whoever

## 2020-11-30 NOTE — TELEPHONE ENCOUNTER
Spoke with patient and advised that she can see Dr. Drusilla Simmonds as well. Gave patient contact information.

## 2020-12-08 ENCOUNTER — TELEPHONE (OUTPATIENT)
Dept: FAMILY MEDICINE CLINIC | Facility: CLINIC | Age: 60
End: 2020-12-08

## 2020-12-08 RX ORDER — ONDANSETRON 4 MG/1
4 TABLET, FILM COATED ORAL EVERY 8 HOURS PRN
Qty: 15 TABLET | Refills: 0 | Status: SHIPPED | OUTPATIENT
Start: 2020-12-08 | End: 2021-04-03

## 2020-12-08 RX ORDER — ONDANSETRON 4 MG/1
4 TABLET, FILM COATED ORAL EVERY 8 HOURS PRN
Qty: 15 TABLET | Refills: 0 | Status: CANCELLED | OUTPATIENT
Start: 2020-12-08

## 2020-12-08 NOTE — TELEPHONE ENCOUNTER
Spoke with patient who states she is experiencing some pain but mostly nausea from left shoulder tear. Patient is taking Tylenol for pain, states does not do well with anti-inflammatories due to stomach upset.  Patient has appt on Friday 12/11/20 with ortho

## 2020-12-09 ENCOUNTER — TELEPHONE (OUTPATIENT)
Dept: FAMILY MEDICINE CLINIC | Facility: CLINIC | Age: 60
End: 2020-12-09

## 2020-12-09 DIAGNOSIS — E03.9 ACQUIRED HYPOTHYROIDISM: Primary | ICD-10-CM

## 2020-12-09 RX ORDER — LEVOTHYROXINE SODIUM 0.12 MG/1
125 TABLET ORAL
Qty: 90 TABLET | Refills: 0 | Status: SHIPPED | OUTPATIENT
Start: 2020-12-09 | End: 2021-03-05

## 2020-12-09 NOTE — TELEPHONE ENCOUNTER
Fax received from pharmacy requesting Levothyroxine 125 mcg to replace Levothyroxine 100 mcg and 25 mcg tabs together daily. Left msg on patient's voicemail to schedule lab visit.      LOV  10/23/20    LAST LAB 10/23/20 due for TSH 12/23/20    LAST RX

## 2020-12-11 ENCOUNTER — TELEPHONE (OUTPATIENT)
Dept: FAMILY MEDICINE CLINIC | Facility: CLINIC | Age: 60
End: 2020-12-11

## 2020-12-17 DIAGNOSIS — I10 ESSENTIAL (PRIMARY) HYPERTENSION: ICD-10-CM

## 2020-12-17 DIAGNOSIS — I10 ESSENTIAL HYPERTENSION: ICD-10-CM

## 2020-12-17 RX ORDER — CARVEDILOL 12.5 MG/1
TABLET ORAL
Qty: 180 TABLET | Refills: 0 | Status: SHIPPED | OUTPATIENT
Start: 2020-12-17 | End: 2022-01-31

## 2020-12-17 RX ORDER — TORSEMIDE 20 MG/1
TABLET ORAL
Qty: 90 TABLET | Refills: 0 | Status: SHIPPED | OUTPATIENT
Start: 2020-12-17 | End: 2022-01-31

## 2020-12-17 NOTE — TELEPHONE ENCOUNTER
Last refill on cardedilol #180 on 9/11/2020  Last refill on torsemide #90 on 9/10/2020  Last office visit pertaining to refills on 10/23/2020  No future appointments.   BP Readings from Last 3 Encounters:  10/23/20 : (!) 150/92  06/09/20 : (!) 160/108  09/1

## 2021-01-03 NOTE — LETTER
[facility] IN [place]  [address] [address]  Dept: [phone]  Dept Fax: [phone]         January 19, 2017    Patient: Ha Davies   YOB: 1960   Date of Visit: 1/19/2017       To Whom It May Concern:    Ter bilateral upper extremity ROM was WFL (within functional limits)/bilateral lower extremity ROM was WFL (within functional limits) bilateral upper extremity ROM was WFL (within functional limits)/bilateral lower extremity ROM was WFL (within functional limits)

## 2021-03-05 RX ORDER — LEVOTHYROXINE SODIUM 0.12 MG/1
125 TABLET ORAL
Qty: 30 TABLET | Refills: 0 | Status: SHIPPED | OUTPATIENT
Start: 2021-03-05 | End: 2021-04-03

## 2021-03-05 NOTE — TELEPHONE ENCOUNTER
Last refill #90 on 12/9/2020  Last office visit pertaining to refill on 10/23/2020  No future appointments.   Patient was to recheck TSH in 6-8 weeks after blood draw on 10/23/2020  Reminder letter sent

## 2021-03-22 RX ORDER — LEVOTHYROXINE SODIUM 0.12 MG/1
125 TABLET ORAL
Qty: 30 TABLET | Refills: 0 | OUTPATIENT
Start: 2021-03-22

## 2021-03-24 ENCOUNTER — TELEPHONE (OUTPATIENT)
Dept: FAMILY MEDICINE CLINIC | Facility: CLINIC | Age: 61
End: 2021-03-24

## 2021-03-24 RX ORDER — LEVOTHYROXINE SODIUM 0.12 MG/1
125 TABLET ORAL
Qty: 30 TABLET | Refills: 0 | OUTPATIENT
Start: 2021-03-24

## 2021-03-24 NOTE — TELEPHONE ENCOUNTER
Received request for refill of levothyroxine, refill too soon and patient is overdue for labs. Called patient and she states is coming in to see Dr. Preciado Sit in early April and she will get labs done then.  Advised if she needs refill of med prior to appt to Admitted for pneumonia, likely related to COVID-19. 56M from home, lives with wife and son, no PMH, presented with fever, cough, shortness of breath X 1 week. He reports he took some medication for 6 days with mild relief but he does not know name. He states his wife was sick but was not tested. Denies N/V/D, chest pain, dizziness, travel.  Admitted for pneumonia, likely related to COVID-19.

## 2021-04-03 RX ORDER — LEVOTHYROXINE SODIUM 0.12 MG/1
125 TABLET ORAL
Qty: 30 TABLET | Refills: 0 | Status: SHIPPED | OUTPATIENT
Start: 2021-04-03 | End: 2021-05-10

## 2021-04-03 RX ORDER — ONDANSETRON 4 MG/1
4 TABLET, FILM COATED ORAL EVERY 8 HOURS PRN
Qty: 15 TABLET | Refills: 0 | Status: SHIPPED | OUTPATIENT
Start: 2021-04-03 | End: 2021-07-27

## 2021-04-03 RX ORDER — ATORVASTATIN CALCIUM 40 MG/1
40 TABLET, FILM COATED ORAL NIGHTLY
Qty: 30 TABLET | Refills: 0 | Status: SHIPPED | OUTPATIENT
Start: 2021-04-03 | End: 2021-04-06

## 2021-04-03 NOTE — TELEPHONE ENCOUNTER
Dr Clark Saez,   I pended Zofran, sent her statin and thyroid medication. Did not pend Tramadol as she hasn't taken in a long time. Talked with patient and she is going out of town, has appt this month. Please advise.      LOV 10/23/20           8/20/19 gamal FISHMAN

## 2021-04-03 NOTE — TELEPHONE ENCOUNTER
Levothyroxine Sodium 125 MCG Oral Tab, ATORVASTATIN 40 MG Oral Tab,Ondansetron HCl (ZOFRAN) 4 mg tablet and tramadol please call into [facility].   Joesph Fritz has to go out of town is asking for a month of her levothyroxine and atorvastatin and just enough medic

## 2021-04-06 ENCOUNTER — TELEPHONE (OUTPATIENT)
Dept: FAMILY MEDICINE CLINIC | Facility: CLINIC | Age: 61
End: 2021-04-06

## 2021-04-06 RX ORDER — ATORVASTATIN CALCIUM 40 MG/1
40 TABLET, FILM COATED ORAL NIGHTLY
Qty: 90 TABLET | Refills: 0 | Status: SHIPPED | OUTPATIENT
Start: 2021-04-06 | End: 2021-07-19

## 2021-04-06 NOTE — TELEPHONE ENCOUNTER
Pharmacy called and is requiring a 90 day supply of medication.   Ok to send 90 day per   Last OV: 10/23/20  Last refill: 9/11/20  Requested Prescriptions     Pending Prescriptions Disp Refills   • atorvastatin 40 MG Oral Tab 90 tablet 0     Sig: T

## 2021-04-16 ENCOUNTER — OFFICE VISIT (OUTPATIENT)
Dept: FAMILY MEDICINE CLINIC | Facility: CLINIC | Age: 61
End: 2021-04-16
Payer: COMMERCIAL

## 2021-04-16 ENCOUNTER — HOSPITAL ENCOUNTER (OUTPATIENT)
Dept: GENERAL RADIOLOGY | Age: 61
Discharge: HOME | End: 2021-04-16
Attending: FAMILY MEDICINE
Payer: COMMERCIAL

## 2021-04-16 ENCOUNTER — LAB ENCOUNTER (OUTPATIENT)
Dept: LAB | Age: 61
End: 2021-04-16
Attending: FAMILY MEDICINE
Payer: COMMERCIAL

## 2021-04-16 VITALS
BODY MASS INDEX: 37.68 KG/M2 | WEIGHT: 248.63 LBS | TEMPERATURE: 98 F | SYSTOLIC BLOOD PRESSURE: 138 MMHG | HEIGHT: 68 IN | HEART RATE: 74 BPM | DIASTOLIC BLOOD PRESSURE: 86 MMHG

## 2021-04-16 DIAGNOSIS — M79.672 LEFT FOOT PAIN: ICD-10-CM

## 2021-04-16 DIAGNOSIS — M31.0 LEUCOCYTOCLASTIC VASCULITIS (HCC): ICD-10-CM

## 2021-04-16 DIAGNOSIS — Z12.31 BREAST CANCER SCREENING BY MAMMOGRAM: ICD-10-CM

## 2021-04-16 DIAGNOSIS — J30.89 SEASONAL ALLERGIC RHINITIS DUE TO OTHER ALLERGIC TRIGGER: ICD-10-CM

## 2021-04-16 DIAGNOSIS — E78.00 PURE HYPERCHOLESTEROLEMIA: ICD-10-CM

## 2021-04-16 DIAGNOSIS — M19.212 SECONDARY OSTEOARTHRITIS OF LEFT SHOULDER DUE TO ROTATOR CUFF ARTHROPATHY: Primary | ICD-10-CM

## 2021-04-16 DIAGNOSIS — I25.10 CORONARY ARTERIOSCLEROSIS: ICD-10-CM

## 2021-04-16 DIAGNOSIS — J45.21 MILD INTERMITTENT ASTHMA WITH EXACERBATION: ICD-10-CM

## 2021-04-16 DIAGNOSIS — I10 ESSENTIAL HYPERTENSION: ICD-10-CM

## 2021-04-16 DIAGNOSIS — E03.9 ACQUIRED HYPOTHYROIDISM: ICD-10-CM

## 2021-04-16 DIAGNOSIS — Z01.818 PREOP EXAMINATION: ICD-10-CM

## 2021-04-16 DIAGNOSIS — Z12.11 COLON CANCER SCREENING: ICD-10-CM

## 2021-04-16 PROBLEM — S46.012A TRAUMATIC COMPLETE TEAR OF LEFT ROTATOR CUFF: Status: ACTIVE | Noted: 2021-04-07

## 2021-04-16 PROCEDURE — 3079F DIAST BP 80-89 MM HG: CPT | Performed by: FAMILY MEDICINE

## 2021-04-16 PROCEDURE — 36415 COLL VENOUS BLD VENIPUNCTURE: CPT | Performed by: FAMILY MEDICINE

## 2021-04-16 PROCEDURE — 80061 LIPID PANEL: CPT | Performed by: FAMILY MEDICINE

## 2021-04-16 PROCEDURE — 80050 GENERAL HEALTH PANEL: CPT | Performed by: FAMILY MEDICINE

## 2021-04-16 PROCEDURE — 99214 OFFICE O/P EST MOD 30 MIN: CPT | Performed by: FAMILY MEDICINE

## 2021-04-16 PROCEDURE — 82550 ASSAY OF CK (CPK): CPT | Performed by: FAMILY MEDICINE

## 2021-04-16 PROCEDURE — 73630 X-RAY EXAM OF FOOT: CPT | Performed by: FAMILY MEDICINE

## 2021-04-16 PROCEDURE — 3008F BODY MASS INDEX DOCD: CPT | Performed by: FAMILY MEDICINE

## 2021-04-16 PROCEDURE — 93000 ELECTROCARDIOGRAM COMPLETE: CPT | Performed by: FAMILY MEDICINE

## 2021-04-16 PROCEDURE — 3075F SYST BP GE 130 - 139MM HG: CPT | Performed by: FAMILY MEDICINE

## 2021-04-16 RX ORDER — HYDROCODONE BITARTRATE AND ACETAMINOPHEN 10; 325 MG/1; MG/1
1-2 TABLET ORAL
COMMUNITY
Start: 2021-04-15 | End: 2021-07-27

## 2021-04-16 NOTE — PROGRESS NOTES
Lorenzo Lawrence is a 57year old female who presents for a pre-operative physical exam. Patient is to have left shoulder arthroscopy, debridement, decompression, rotator cuff repair, possible biceps tenodesis, to be done by Dr. Miri Marte at [facility] 4/16/2021 )     • fluticasone-salmeterol 250-50 MCG/DOSE Inhalation Aerosol Powder, Breath Activated Inhale 1 puff into the lungs every 12 (twelve) hours.  60 each 0      Allergies:   Iodine (Topical)        OTHER (SEE COMMENTS), SWELLING    Comment:Celluli reconstruction    • UPPER GI ENDOSCOPY,DIAGNOSIS  1995    minimla gastritis TARAN -   • UPPER GI ENDOSCOPY,DIAGNOSIS  5/27/08    wnl      Family History   Problem Relation Age of Onset   • Hypertension Mother    • Other (Other) Mother         S/P krys   • C tenderness  : deferred   MUSCULOSKELETAL: back is not tender,FROM of the back  EXTREMITIES: decreased ROM left shoulder, crepitance noted  NEURO: Oriented times three,cranial nerves are intact,motor and sensory are grossly intact    EKG  INTERPRETED BY D

## 2021-04-16 NOTE — PATIENT INSTRUCTIONS
Discharge Instructions for Open Rotator Cuff Repair   You had a procedure called open rotator cuff repair. The rotator cuff consists of the muscles and tendons that surround your shoulder.  The rotator cuff keeps the top of your upper arm bone (humerus) s until your doctor says it's OK. Then shower as needed. Carefully wash your incision with soap and water. Gently pat it dry. Don’t rub the incision or apply creams or lotions. · Your incision was closed using sutures, staples, or strips of tape.  If you hav

## 2021-04-20 ENCOUNTER — TELEPHONE (OUTPATIENT)
Dept: FAMILY MEDICINE CLINIC | Facility: CLINIC | Age: 61
End: 2021-04-20

## 2021-04-23 NOTE — TELEPHONE ENCOUNTER
Talked with patient and she states she had surgery and was given hydrocodone for pain from her surgeon and states it is making her a little nauseated and would like Dr. Mary Rodriguez to prescribe Tramadol for her.  Advised contacting her surgeon and requesting alt

## 2021-05-10 RX ORDER — LEVOTHYROXINE SODIUM 0.12 MG/1
TABLET ORAL
Qty: 90 TABLET | Refills: 1 | Status: SHIPPED | OUTPATIENT
Start: 2021-05-10 | End: 2022-01-08

## 2021-05-10 NOTE — TELEPHONE ENCOUNTER
Last refill #30 on 4/3/2021  Last office visit pertaining to refill on 4/16/2021  No future appointments.   Labs current  Refilled per protocol

## 2021-07-19 RX ORDER — ATORVASTATIN CALCIUM 40 MG/1
TABLET, FILM COATED ORAL
Qty: 90 TABLET | Refills: 0 | Status: SHIPPED | OUTPATIENT
Start: 2021-07-19 | End: 2021-10-12

## 2021-07-19 NOTE — TELEPHONE ENCOUNTER
Last refill #90 on 4/6/20121  Last office visit pertaining to refill on 4/16/2021  No future appointments.   Labs current  Refilled per protocol

## 2021-07-22 ENCOUNTER — TELEPHONE (OUTPATIENT)
Dept: FAMILY MEDICINE CLINIC | Facility: CLINIC | Age: 61
End: 2021-07-22

## 2021-07-22 ENCOUNTER — OFFICE VISIT (OUTPATIENT)
Dept: FAMILY MEDICINE CLINIC | Facility: CLINIC | Age: 61
End: 2021-07-22
Payer: COMMERCIAL

## 2021-07-22 VITALS
RESPIRATION RATE: 18 BRPM | OXYGEN SATURATION: 98 % | WEIGHT: 245 LBS | TEMPERATURE: 98 F | BODY MASS INDEX: 37.13 KG/M2 | HEART RATE: 89 BPM | SYSTOLIC BLOOD PRESSURE: 168 MMHG | HEIGHT: 68 IN | DIASTOLIC BLOOD PRESSURE: 96 MMHG

## 2021-07-22 DIAGNOSIS — L03.211 CELLULITIS OF FACE: ICD-10-CM

## 2021-07-22 DIAGNOSIS — S00.96XA INSECT BITE OF HEAD, UNSPECIFIED PART, INITIAL ENCOUNTER: Primary | ICD-10-CM

## 2021-07-22 DIAGNOSIS — S10.96XA INSECT BITE OF NECK, INITIAL ENCOUNTER: ICD-10-CM
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
## 2021-07-22 PROCEDURE — 3008F BODY MASS INDEX DOCD: CPT | Performed by: NURSE PRACTITIONER

## 2021-07-22 PROCEDURE — 3080F DIAST BP >= 90 MM HG: CPT | Performed by: NURSE PRACTITIONER

## 2021-07-22 PROCEDURE — 99213 OFFICE O/P EST LOW 20 MIN: CPT | Performed by: NURSE PRACTITIONER

## 2021-07-22 PROCEDURE — 3077F SYST BP >= 140 MM HG: CPT | Performed by: NURSE PRACTITIONER

## 2021-07-22 RX ORDER — CLINDAMYCIN HYDROCHLORIDE 300 MG/1
300 CAPSULE ORAL 3 TIMES DAILY
Qty: 21 CAPSULE | Refills: 0 | Status: SHIPPED | OUTPATIENT
Start: 2021-07-22 | End: 2021-07-27

## 2021-07-22 NOTE — TELEPHONE ENCOUNTER
Patient states that she believes that she was bit by possibly a spider. She has 3 areas on her face on multiple areas on her neck. Areas are swollen and itchy, red and hard. Some discharge from the sites.    She believed that she may have been bit multip

## 2021-07-22 NOTE — PATIENT INSTRUCTIONS
Antibiotic as prescribed. You can continue the benadryl and hydrocortisone cream for itching. Follow up with your doctor next week for reevaluation of the infection as well as your elevated blood pressure reading.  Follow up sooner for any new or worsen prescribed, be sure to take them as directed until they are all finished. · You may use over-the-counter pain medicine to control pain, unless another pain medicine was prescribed.  Talk with your healthcare provider before using acetaminophen or ibuprofen be cleaned and covered with cool wet gauze. Symptoms should get better 1 to 2 days after treatment is started. Make sure to take all the antibiotics for the full number of days until they are gone. Keep taking the medicine even if your symptoms go away.

## 2021-07-22 NOTE — TELEPHONE ENCOUNTER
Saad Tinoco was bit by something last night and has swelling on the face mostly by her nose. She took some prednosone this morning and wanted to let the doctor know in case she needed some other medication.

## 2021-07-22 NOTE — PROGRESS NOTES
CHIEF COMPLAINT:   Patient presents with:  Bite Sting,Insect      HPI:     Rosana Ricardo is a 57year old female who presents with concerns of possible insect bite on face.  Pt reports yesterday she was standing outside putting things in her car and there w • fluticasone-salmeterol 250-50 MCG/DOSE Inhalation Aerosol Powder, Breath Activated Inhale 1 puff into the lungs every 12 (twelve) hours.  60 each 0   • Albuterol Sulfate HFA (PROAIR HFA) 108 (90 Base) MCG/ACT Inhalation Aero Soln Inhale 2 puffs into th (1.727 m)   Wt 245 lb (111.1 kg)   LMP  (LMP Unknown)   SpO2 98%   BMI 37.25 kg/m²   GENERAL: well developed, well nourished,in no apparent distress  SKIN: multiple erythematous indurated areas to right side of neck with appearance of insect bites.  Area of and hydrocortisone cream for itching. Follow up with your doctor next week for reevaluation of the infection as well as your elevated blood pressure reading. Follow up sooner for any new or worsening symptoms.        Infected Insect Bite or Sting      Whe all finished. · You may use over-the-counter pain medicine to control pain, unless another pain medicine was prescribed. Talk with your healthcare provider before using acetaminophen or ibuprofen if you have chronic liver or kidney disease.  Also talk with get better 1 to 2 days after treatment is started. Make sure to take all the antibiotics for the full number of days until they are gone. Keep taking the medicine even if your symptoms go away.    Home care  Follow these tips:  · Limit the use of the part o

## 2021-07-26 ENCOUNTER — TELEPHONE (OUTPATIENT)
Dept: FAMILY MEDICINE CLINIC | Facility: CLINIC | Age: 61
End: 2021-07-26

## 2021-07-26 NOTE — TELEPHONE ENCOUNTER
SHE WAS ON THE MACHINE ASKING TO SEE DR BEST FIRST THING IN THE MORNING BECAUSE SHE HAS POISON IVY ALL OVER HER FACE AND THROAT. THERE IS NOTHING AT THAT TIME TOMORROW BUT SHOULD SHE GO TO A [facility] OR URGENT CARE?

## 2021-07-26 NOTE — PROGRESS NOTES
Megan Gardner is a 57year old female. HPI:   Rocco Brunsonal is here for follow up on facial celllulitis from mosquito bites. This occurred 7/21. Seen in UC 7/22 due to swelling and induration around the bites up to 1 inch. Had some drainage. No fever.  Swollen are (SINGULAIR) 10 MG Oral Tab Take 1 tablet (10 mg total) by mouth every evening. 180 tablet 3   • aspirin 325 MG Oral Tab Take 325 mg by mouth. • Isosorbide Mononitrate ER 30 MG Oral Tablet 24 Hr Take 0.5 tablets by mouth.  (Patient not taking: Reported denies tachycarida  NEURO: denies headaches    EXAM:   BP (!) 170/108   Pulse 75   Temp (!) 96 °F (35.6 °C) (Temporal)   Resp 16   Wt 245 lb (111.1 kg)   LMP  (LMP Unknown)   SpO2 98%   BMI 37.25 kg/m²   GENERAL: well developed, well nourished,in no appare

## 2021-07-26 NOTE — TELEPHONE ENCOUNTER
Patient was seen in [facility] on 7/22/2021 for cellulitis of the face from an insect bite. She was put on an clindamycin.   Patient believes that she has poison ivy/poison oak as the rash is spreading down her face, neck, and onto her chest.  There is a patch und

## 2021-07-26 NOTE — PATIENT INSTRUCTIONS
Facial Cellulitis  Cellulitis is an infection of the deep layers of skin. A break in the skin, such as a cut or scratch, can let bacteria under the skin. It may also occur from an infected oil gland (pimple) or hair follicle.  If the bacteria get to deep from the skin (pus)  · An eyelid that swells shut or leaks fluid (pus)  · Headache or neck pain that gets worse  · Unusual drowsiness or confusion  · Seizure  · Change in eyesight  Gurdeep last reviewed this educational content on 8/1/2019  © 7604-5476 Th powder (colloidal oatmeal, sold in pharmacies). Keep the solution in the refrigerator for future use. · If large areas of skin are involved, you may take a lukewarm bath. Add colloidal oatmeal to the water.  Or you can add 1 cup of cornstarch or baking sod draining from the blisters  · Yellow-brown crusts form over the open blisters  · Fever of 100.4°F (38ºC) or higher, or as directed by your provider  Call 911  Call 911 if you have trouble breathing or swallowing.  Or if you have severe swelling in your face

## 2021-07-27 ENCOUNTER — OFFICE VISIT (OUTPATIENT)
Dept: FAMILY MEDICINE CLINIC | Facility: CLINIC | Age: 61
End: 2021-07-27
Payer: COMMERCIAL

## 2021-07-27 VITALS
RESPIRATION RATE: 16 BRPM | WEIGHT: 245 LBS | DIASTOLIC BLOOD PRESSURE: 88 MMHG | BODY MASS INDEX: 37 KG/M2 | SYSTOLIC BLOOD PRESSURE: 136 MMHG | OXYGEN SATURATION: 98 % | TEMPERATURE: 96 F | HEART RATE: 75 BPM

## 2021-07-27 DIAGNOSIS — L03.211 CELLULITIS OF FACE: ICD-10-CM

## 2021-07-27 DIAGNOSIS — L23.7 ALLERGIC DERMATITIS DUE TO POISON OAK: Primary | ICD-10-CM

## 2021-07-27 DIAGNOSIS — I10 ESSENTIAL HYPERTENSION: ICD-10-CM

## 2021-07-27 PROCEDURE — 3075F SYST BP GE 130 - 139MM HG: CPT | Performed by: FAMILY MEDICINE

## 2021-07-27 PROCEDURE — 99214 OFFICE O/P EST MOD 30 MIN: CPT | Performed by: FAMILY MEDICINE

## 2021-07-27 PROCEDURE — 3079F DIAST BP 80-89 MM HG: CPT | Performed by: FAMILY MEDICINE

## 2021-07-27 RX ORDER — HYDROXYZINE HYDROCHLORIDE 25 MG/1
25 TABLET, FILM COATED ORAL
COMMUNITY
Start: 2021-07-01

## 2021-07-27 RX ORDER — TRAMADOL HYDROCHLORIDE 50 MG/1
50 TABLET ORAL EVERY 6 HOURS
COMMUNITY
Start: 2021-07-08 | End: 2021-11-15 | Stop reason: ALTCHOICE

## 2021-07-27 RX ORDER — PREDNISONE 20 MG/1
20 TABLET ORAL 2 TIMES DAILY
Qty: 10 TABLET | Refills: 0 | Status: SHIPPED | OUTPATIENT
Start: 2021-07-27 | End: 2021-08-01

## 2021-09-02 ENCOUNTER — HOSPITAL ENCOUNTER (OUTPATIENT)
Age: 61
Discharge: HOME | End: 2021-09-02
Payer: COMMERCIAL

## 2021-09-02 VITALS
OXYGEN SATURATION: 96 % | TEMPERATURE: 98 F | RESPIRATION RATE: 16 BRPM | SYSTOLIC BLOOD PRESSURE: 161 MMHG | HEART RATE: 83 BPM | DIASTOLIC BLOOD PRESSURE: 98 MMHG

## 2021-09-02 DIAGNOSIS — R09.89 CHEST CONGESTION: Primary | ICD-10-CM

## 2021-09-02 DIAGNOSIS — J06.9 VIRAL UPPER RESPIRATORY ILLNESS: ICD-10-CM

## 2021-09-02 LAB — SARS-COV-2 RNA RESP QL NAA+PROBE: NOT DETECTED

## 2021-09-02 PROCEDURE — U0002 COVID-19 LAB TEST NON-CDC: HCPCS | Performed by: PHYSICIAN ASSISTANT

## 2021-09-02 PROCEDURE — 99203 OFFICE O/P NEW LOW 30 MIN: CPT | Performed by: PHYSICIAN ASSISTANT

## 2021-09-02 NOTE — ED PROVIDER NOTES
Patient Seen in: Immediate [facility]      History   Patient presents with:  Cough/URI    Stated Complaint: sinus congestion/cough/chest congestion/asthma    HPI/Subjective:   HPI    21-year-old female.   Moderate medical history pertinent for asthma and ENDOSCOPY,DIAGNOSIS  1995    minimla gastritis TARAN -   • UPPER GI ENDOSCOPY,DIAGNOSIS  5/27/08    wnl                Social History    Tobacco Use      Smoking status: Never Smoker      Smokeless tobacco: Never Used      Tobacco comment: Non smoker     Vap K[facility] [address]  [phone]                Medications Prescribed:  Current Discharge Medication List

## 2021-09-02 NOTE — ED INITIAL ASSESSMENT (HPI)
Pt possibly exposed to covid last weekend. Pt is vaccinated and has asthma. Last couple of days, watery eyes, chest congestion and feels her asthma has worsened. Used her nebulizer  Last night and took 40mg prednisone. Here for covid test. No fever.

## 2021-09-07 ENCOUNTER — OFFICE VISIT (OUTPATIENT)
Dept: FAMILY MEDICINE CLINIC | Facility: CLINIC | Age: 61
End: 2021-09-07
Payer: COMMERCIAL

## 2021-09-07 VITALS
SYSTOLIC BLOOD PRESSURE: 172 MMHG | OXYGEN SATURATION: 98 % | TEMPERATURE: 97 F | DIASTOLIC BLOOD PRESSURE: 100 MMHG | HEART RATE: 68 BPM

## 2021-09-07 DIAGNOSIS — J45.41 MODERATE PERSISTENT INTRINSIC ASTHMA WITH ACUTE EXACERBATION: Primary | ICD-10-CM

## 2021-09-07 DIAGNOSIS — J30.89 SEASONAL ALLERGIC RHINITIS DUE TO OTHER ALLERGIC TRIGGER: ICD-10-CM

## 2021-09-07 DIAGNOSIS — I10 ESSENTIAL HYPERTENSION: ICD-10-CM

## 2021-09-07 PROCEDURE — 3080F DIAST BP >= 90 MM HG: CPT | Performed by: FAMILY MEDICINE

## 2021-09-07 PROCEDURE — 99213 OFFICE O/P EST LOW 20 MIN: CPT | Performed by: FAMILY MEDICINE

## 2021-09-07 PROCEDURE — 3077F SYST BP >= 140 MM HG: CPT | Performed by: FAMILY MEDICINE

## 2021-09-07 RX ORDER — IPRATROPIUM BROMIDE AND ALBUTEROL SULFATE 2.5; .5 MG/3ML; MG/3ML
3 SOLUTION RESPIRATORY (INHALATION)
Qty: 120 EACH | Refills: 1 | Status: SHIPPED | OUTPATIENT
Start: 2021-09-07

## 2021-09-07 RX ORDER — PREDNISONE 20 MG/1
TABLET ORAL
Qty: 11 TABLET | Refills: 0 | Status: SHIPPED | OUTPATIENT
Start: 2021-09-07 | End: 2021-09-16

## 2021-09-07 RX ORDER — BENZONATATE 200 MG/1
200 CAPSULE ORAL 3 TIMES DAILY PRN
Qty: 15 CAPSULE | Refills: 0 | Status: SHIPPED | OUTPATIENT
Start: 2021-09-07 | End: 2021-11-15 | Stop reason: ALTCHOICE

## 2021-09-07 NOTE — PATIENT INSTRUCTIONS
I advised patient that her asthma and allergy treatment needs to be a little more regimented.   I have asked her to to begin using her Flovent 220 mcg inhaler 2 puffs twice daily, rinse mouth after use  Prednisone taper 40 mg daily x3 days followed by 20 mg

## 2021-09-07 NOTE — PROGRESS NOTES
Phill Jamison is a 57year old female. Patient presents with:  Cough  Breathing Problem      HPI:   Patient states that she has had a flareup of her asthma and allergy symptoms since last week.   She works at the [employer] and has been TORSEMIDE 20 MG Oral Tab TAKE 1 TABLET BY MOUTH EVERY DAY 90 tablet 0   • CARVEDILOL 12.5 MG Oral Tab TAKE 1 TABLET BY MOUTH TWO TIMES DAILY.  BEST IF TAKEN WITH FOOD. 180 tablet 0   • ipratropium-albuterol (DUONEB) 0.5-2.5 (3) MG/3ML Inhalation Solution Ta use: No      Alcohol/week: 0.0 standard drinks    Drug use: No       REVIEW OF SYSTEMS:   GENERAL HEALTH:  + fatigue, appetite ok, no fever  SKIN: denies any unusual skin lesions or rashes  ENT: + nasal congestion, pnd, denies sore throat, ear pain or pres benzonatate 200 MG Oral Cap 15 capsule 0     Sig: Take 1 capsule (200 mg total) by mouth 3 (three) times daily as needed for cough. Imaging & Consults:  None  No follow-ups on file.   Patient Instructions   I advised patient that her asthma and allergy

## 2021-09-30 RX ORDER — FLUTICASONE PROPIONATE 220 UG/1
AEROSOL, METERED RESPIRATORY (INHALATION)
Qty: 12 EACH | Refills: 0 | Status: SHIPPED | OUTPATIENT
Start: 2021-09-30

## 2021-09-30 NOTE — TELEPHONE ENCOUNTER
Last office visit: 9/7/21  Last refill: 9/7/21  No future appointments.   Name from pharmacy: [address] [place] [address] [address]          Will file in chart as: [address] [place], [place] [address]A 220 MCG/ACT Inhalation Aerosol    Sig: TAKE 2 [address]:  12 each

## 2021-10-12 RX ORDER — ATORVASTATIN CALCIUM 40 MG/1
TABLET, FILM COATED ORAL
Qty: 90 TABLET | Refills: 0 | Status: SHIPPED | OUTPATIENT
Start: 2021-10-12 | End: 2022-01-06

## 2021-10-12 NOTE — TELEPHONE ENCOUNTER
Last refill: 07/19/21  Qty: 90  W/ 0 refills  Last ov: 07/27/21    Requested Prescriptions     Pending Prescriptions Disp Refills   • ATORVASTATIN 40 MG Oral Tab [Pharmacy Med Name: ATORVASTATIN 40 MG TABLET] 90 tablet 0     Sig: TAKE 1 TABLET BY MOUTH DAVIE

## 2021-11-15 ENCOUNTER — TELEPHONE (OUTPATIENT)
Dept: FAMILY MEDICINE CLINIC | Facility: CLINIC | Age: 61
End: 2021-11-15

## 2021-11-15 ENCOUNTER — OFFICE VISIT (OUTPATIENT)
Dept: FAMILY MEDICINE CLINIC | Facility: CLINIC | Age: 61
End: 2021-11-15
Payer: COMMERCIAL

## 2021-11-15 VITALS
TEMPERATURE: 98 F | OXYGEN SATURATION: 99 % | RESPIRATION RATE: 18 BRPM | DIASTOLIC BLOOD PRESSURE: 102 MMHG | HEART RATE: 68 BPM | SYSTOLIC BLOOD PRESSURE: 158 MMHG

## 2021-11-15 DIAGNOSIS — J01.00 ACUTE NON-RECURRENT MAXILLARY SINUSITIS: Primary | ICD-10-CM

## 2021-11-15 DIAGNOSIS — J45.41 MODERATE PERSISTENT INTRINSIC ASTHMA WITH ACUTE EXACERBATION: ICD-10-CM

## 2021-11-15 PROCEDURE — 99213 OFFICE O/P EST LOW 20 MIN: CPT | Performed by: FAMILY MEDICINE

## 2021-11-15 PROCEDURE — 3077F SYST BP >= 140 MM HG: CPT | Performed by: FAMILY MEDICINE

## 2021-11-15 PROCEDURE — 3080F DIAST BP >= 90 MM HG: CPT | Performed by: FAMILY MEDICINE

## 2021-11-15 RX ORDER — LEVOFLOXACIN 500 MG/1
500 TABLET, FILM COATED ORAL DAILY
Qty: 10 TABLET | Refills: 0 | Status: SHIPPED | OUTPATIENT
Start: 2021-11-15 | End: 2021-11-25

## 2021-11-15 NOTE — TELEPHONE ENCOUNTER
Patient states she is having asthma issues. She has been doing her treatments but nothing is helping. She is experiencing difficulty breathing, cough, & bad wheezing. Pulse oxygen has 93-96.

## 2021-11-15 NOTE — TELEPHONE ENCOUNTER
Spoke with patient. She states that she has had URI for about one week. This weekend, she began experiencing wheezing and SOB. Covid testing x 2 negative. Patient has a history of asthma. Started taking prednisone 40mg on Saturday and Sunday.   Using d

## 2021-11-15 NOTE — PROGRESS NOTES
Venus Murillo is a 57year old female.   Patient presents with:  Breathing Problem: shortness of breath, wheezing, cough-started tuesday-40mg prednisone fri/sat/sun/today, duonebs every 4 hours since sat, flovent every 4 hours      HPI:   Has a history of a Rfl:   Albuterol Sulfate HFA (PROAIR HFA) 108 (90 Base) MCG/ACT Inhalation Aero Soln, Inhale 2 puffs into the lungs every 4 (four) hours as needed for Wheezing., Disp: 1 Inhaler, Rfl: 6  fluticasone-salmeterol 250-50 MCG/DOSE Inhalation Aerosol Powder, Jennifer Family History   Problem Relation Age of Onset   • Hypertension Mother    • Other (Other) Mother         S/P krys   • Cancer Maternal Grandmother         colon cancer   • Cancer Sister         rectal cancer   • Other (Other) Sister         GERD know the status of the Covid as it can interfere with her immune response. Going to start her on Levaquin to cover for sinus infection but also because she has start of any pneumonia.   She will continue the nebulizer treatments every 4 hours, Flovent 220

## 2021-11-18 ENCOUNTER — HOSPITAL ENCOUNTER (OUTPATIENT)
Dept: GENERAL RADIOLOGY | Age: 61
Discharge: HOME | End: 2021-11-18
Attending: FAMILY MEDICINE
Payer: COMMERCIAL

## 2021-11-18 ENCOUNTER — TELEPHONE (OUTPATIENT)
Dept: FAMILY MEDICINE CLINIC | Facility: CLINIC | Age: 61
End: 2021-11-18

## 2021-11-18 DIAGNOSIS — R06.2 WHEEZING: Primary | ICD-10-CM

## 2021-11-18 DIAGNOSIS — R06.2 WHEEZING: ICD-10-CM

## 2021-11-18 PROCEDURE — 71046 X-RAY EXAM CHEST 2 VIEWS: CPT | Performed by: FAMILY MEDICINE

## 2021-11-18 RX ORDER — PREDNISONE 20 MG/1
20 TABLET ORAL 2 TIMES DAILY
Qty: 10 TABLET | Refills: 0 | Status: SHIPPED | OUTPATIENT
Start: 2021-11-18 | End: 2021-11-23

## 2021-11-18 NOTE — TELEPHONE ENCOUNTER
Spoke with patient. Patient was evaluated in sick clinic on Monday. She was started on levaquin. Patient continues to have difficulty breathing. Able to speak in sentences on phone but sounds hoarse.   She has asthma and states that she \"has never been

## 2021-11-18 NOTE — TELEPHONE ENCOUNTER
She should have a chest x-ray this afternoon. She should see Dr. Ronen Huitron tomorrow. If her chest x-ray is negative she may need a CT scan of the chest.  If she gets any worse she needs to go to the emergency room.

## 2021-11-18 NOTE — TELEPHONE ENCOUNTER
Called Dr. Shania Serrano for orders for prednisone and regarding office visit tomorrow.     Future Appointments   Date Time Provider Jania Montoya   11/18/2021  1:15 PM SW XR RM 1 SW XRAY [place]   11/19/2021  8:15 AM Bigg Tamez, DO [facility]

## 2021-11-18 NOTE — TELEPHONE ENCOUNTER
She is still having issues breathing and coughing. She is asking for meds. She said she tested neg. For covid.

## 2021-11-19 ENCOUNTER — OFFICE VISIT (OUTPATIENT)
Dept: FAMILY MEDICINE CLINIC | Facility: CLINIC | Age: 61
End: 2021-11-19
Payer: COMMERCIAL

## 2021-11-19 VITALS
TEMPERATURE: 97 F | OXYGEN SATURATION: 99 % | RESPIRATION RATE: 17 BRPM | HEART RATE: 72 BPM | DIASTOLIC BLOOD PRESSURE: 84 MMHG | SYSTOLIC BLOOD PRESSURE: 136 MMHG

## 2021-11-19 DIAGNOSIS — J45.41 MODERATE PERSISTENT INTRINSIC ASTHMA WITH ACUTE EXACERBATION: Primary | ICD-10-CM

## 2021-11-19 DIAGNOSIS — J01.00 ACUTE NON-RECURRENT MAXILLARY SINUSITIS: ICD-10-CM

## 2021-11-19 DIAGNOSIS — J30.89 SEASONAL ALLERGIC RHINITIS DUE TO OTHER ALLERGIC TRIGGER: ICD-10-CM

## 2021-11-19 PROCEDURE — 3079F DIAST BP 80-89 MM HG: CPT | Performed by: FAMILY MEDICINE

## 2021-11-19 PROCEDURE — 3075F SYST BP GE 130 - 139MM HG: CPT | Performed by: FAMILY MEDICINE

## 2021-11-19 PROCEDURE — 99214 OFFICE O/P EST MOD 30 MIN: CPT | Performed by: FAMILY MEDICINE

## 2021-11-19 RX ORDER — ALBUTEROL SULFATE 2.5 MG/3ML
2.5 SOLUTION RESPIRATORY (INHALATION) EVERY 4 HOURS PRN
Qty: 50 EACH | Refills: 3 | Status: SHIPPED | OUTPATIENT
Start: 2021-11-19

## 2021-11-19 RX ORDER — BENZONATATE 100 MG/1
100 CAPSULE ORAL 3 TIMES DAILY PRN
Qty: 30 CAPSULE | Refills: 0 | Status: SHIPPED | OUTPATIENT
Start: 2021-11-19

## 2021-11-19 NOTE — PATIENT INSTRUCTIONS
Viral or Bacterial Bronchitis with Wheezing (Adult)    Bronchitis is an infection of the air passages. It often occurs during a cold and is usually caused by a virus. Symptoms include cough with mucus (phlegm) and low-grade fever.  This illness is contagi cough, cold, and sore-throat medicines will not shorten the length of the illness, but they may be helpful to reduce symptoms. (Note: Don't use decongestants if you have high blood pressure.)  · If you were given an inhaler, use it exactly as directed.  If

## 2021-11-19 NOTE — PROGRESS NOTES
Yanna Yoon is a 57year old female. HPI:   Estefani Mims is here for follow up on wheezing. Started last Tuesday. Started prednisone and nebulizer and stil coughing. covid testing negatvie.  Was seen in resp clinic, diagnosed with sinusitis and put on levaqui (3) MG/3ML Inhalation Solution Take 3 mL by nebulization 4 (four) times daily as needed. 120 mL 0   • Montelukast Sodium (SINGULAIR) 10 MG Oral Tab Take 1 tablet (10 mg total) by mouth every evening.  180 tablet 3   • aspirin 325 MG Oral Tab Take 325 mg by CARDIOVASCULAR: denies chest pain on exertion  GI: denies abdominal pain and denies heartburn  NEURO: denies headaches    EXAM:   /84   Pulse 72   Temp 97.3 °F (36.3 °C) (Temporal)   Resp 17   LMP  (LMP Unknown)   SpO2 99%   GENERAL: well developed

## 2022-01-06 RX ORDER — ATORVASTATIN CALCIUM 40 MG/1
TABLET, FILM COATED ORAL
Qty: 90 TABLET | Refills: 0 | Status: SHIPPED | OUTPATIENT
Start: 2022-01-06

## 2022-01-06 NOTE — TELEPHONE ENCOUNTER
Requested Prescriptions     Pending Prescriptions Disp Refills   • ATORVASTATIN 40 MG Oral Tab [Pharmacy Med Name: ATORVASTATIN 40 MG TABLET] 90 tablet 0     Sig: TAKE 1 TABLET BY MOUTH EVERY DAY AT NIGHT     Last refill #90 on 10/12/2021  Last office visi

## 2022-01-08 RX ORDER — LEVOTHYROXINE SODIUM 0.12 MG/1
125 TABLET ORAL
Qty: 90 TABLET | Refills: 0 | Status: SHIPPED | OUTPATIENT
Start: 2022-01-08

## 2022-01-08 NOTE — TELEPHONE ENCOUNTER
Last refill: 05/10/21  Qty: 90  W/ 1 refills  Last ov: 11/19/21    Requested Prescriptions     Pending Prescriptions Disp Refills   • LEVOTHYROXINE 125 MCG Oral Tab [Pharmacy Med Name: LEVOTHYROXINE 125 MCG TABLET] 90 tablet 1     Sig: TAKE 1 TABLET BY ASHLEY

## 2022-01-29 DIAGNOSIS — I10 ESSENTIAL (PRIMARY) HYPERTENSION: ICD-10-CM

## 2022-01-29 DIAGNOSIS — I10 ESSENTIAL HYPERTENSION: ICD-10-CM

## 2022-01-31 DIAGNOSIS — I10 ESSENTIAL HYPERTENSION: ICD-10-CM

## 2022-01-31 RX ORDER — TORSEMIDE 20 MG/1
TABLET ORAL
Qty: 90 TABLET | Refills: 0 | Status: SHIPPED | OUTPATIENT
Start: 2022-01-31 | End: 2022-01-31

## 2022-01-31 RX ORDER — TORSEMIDE 20 MG/1
20 TABLET ORAL DAILY
Qty: 90 TABLET | Refills: 0 | Status: SHIPPED | OUTPATIENT
Start: 2022-01-31

## 2022-01-31 RX ORDER — CARVEDILOL 12.5 MG/1
TABLET ORAL
Qty: 180 TABLET | Refills: 0 | Status: SHIPPED | OUTPATIENT
Start: 2022-01-31

## 2022-01-31 NOTE — TELEPHONE ENCOUNTER
Requested Prescriptions     Pending Prescriptions Disp Refills   • CARVEDILOL 12.5 MG Oral Tab [Pharmacy Med Name: CARVEDILOL 12.5 MG TABLET] 180 tablet 0     Sig: TAKE 1 TABLET BY MOUTH TWO TIMES DAILY. BEST IF TAKEN WITH FOOD.    • TORSEMIDE 20 MG Oral Ta

## 2022-03-01 RX ORDER — ATORVASTATIN CALCIUM 40 MG/1
TABLET, FILM COATED ORAL
Qty: 90 TABLET | Refills: 0 | Status: SHIPPED | OUTPATIENT
Start: 2022-03-01

## 2022-03-01 RX ORDER — LEVOTHYROXINE SODIUM 0.12 MG/1
TABLET ORAL
Qty: 90 TABLET | Refills: 0 | Status: SHIPPED | OUTPATIENT
Start: 2022-03-01

## 2022-04-15 ENCOUNTER — TELEMEDICINE (OUTPATIENT)
Dept: FAMILY MEDICINE CLINIC | Facility: CLINIC | Age: 62
End: 2022-04-15

## 2022-04-15 ENCOUNTER — TELEPHONE (OUTPATIENT)
Dept: FAMILY MEDICINE CLINIC | Facility: CLINIC | Age: 62
End: 2022-04-15

## 2022-04-15 DIAGNOSIS — J01.00 ACUTE NON-RECURRENT MAXILLARY SINUSITIS: ICD-10-CM

## 2022-04-15 DIAGNOSIS — J20.9 ACUTE BRONCHITIS WITH BRONCHOSPASM: Primary | ICD-10-CM

## 2022-04-15 PROCEDURE — 99213 OFFICE O/P EST LOW 20 MIN: CPT | Performed by: FAMILY MEDICINE

## 2022-04-15 RX ORDER — AZITHROMYCIN 250 MG/1
TABLET, FILM COATED ORAL
Qty: 6 TABLET | Refills: 0 | Status: SHIPPED | OUTPATIENT
Start: 2022-04-15 | End: 2022-04-20

## 2022-04-15 RX ORDER — PREDNISONE 20 MG/1
20 TABLET ORAL 2 TIMES DAILY
Qty: 10 TABLET | Refills: 0 | Status: SHIPPED | OUTPATIENT
Start: 2022-04-15 | End: 2022-04-20

## 2022-04-15 NOTE — TELEPHONE ENCOUNTER
PATIENT STATES SHE HAS A SINUS INFECTION, DRAINAGE, GREENAGE AND STARTED ON TUESDAY , DID COVID TEST TODAY IT WAS NEGATIVE, SHE WANTS TO KNOW IF SOMETHING  CALLED [facility] [address]  PLEASE ADV

## 2022-06-07 DIAGNOSIS — I10 ESSENTIAL (PRIMARY) HYPERTENSION: ICD-10-CM

## 2022-06-07 RX ORDER — CARVEDILOL 12.5 MG/1
TABLET ORAL
Qty: 60 TABLET | Refills: 0 | Status: SHIPPED | OUTPATIENT
Start: 2022-06-07

## 2022-06-07 NOTE — TELEPHONE ENCOUNTER
LOV  Pertaining to visit 8/20/19   Left msg on voicemail that patient is due for OV and labs. LAST LAB  4/16/21    LAST RX  1/31/22 90 tabs 0 refills    Next OV   No future appointments.       PROTOCOL  Hypertension Medications Protocol Failed 06/07/2022 04:28 PM   Protocol Details  CMP or BMP in past 12 months    Appointment in past 6 or next 3 months    Last serum creatinine< 2.0

## 2022-06-15 ENCOUNTER — TELEPHONE (OUTPATIENT)
Dept: FAMILY MEDICINE CLINIC | Facility: CLINIC | Age: 62
End: 2022-06-15

## 2022-06-15 NOTE — TELEPHONE ENCOUNTER
Patient states developed dizziness with movement last week while on vacation. Was seen at [facility] and was given prednisone and zofran which has helped somewhat. Patient still experiencing dizziness, is working but unable to drive. Denies HA, numbness, tingling, visual changes. Requesting referral for specialist that may help with this. Advised being seen by provider here first. Appt scheduled for tomorrow and advised to ER if symptoms worsen prior to appt.    Future Appointments   Date Time Provider Jania Montoya   6/16/2022 11:00 AM ELISEO Riggs EM[facility] [place]

## 2022-06-15 NOTE — TELEPHONE ENCOUNTER
PT IS HAVING VERTIGO SYMPTOMS- WOULD LIKE REFERRAL TO A  IN [place]-    PLEASE CALL PT TO GO OVER AND DISCUSS REFERRAL-    [facility]

## 2022-06-16 ENCOUNTER — OFFICE VISIT (OUTPATIENT)
Dept: FAMILY MEDICINE CLINIC | Facility: CLINIC | Age: 62
End: 2022-06-16
Payer: COMMERCIAL

## 2022-06-16 VITALS
SYSTOLIC BLOOD PRESSURE: 148 MMHG | OXYGEN SATURATION: 98 % | RESPIRATION RATE: 20 BRPM | HEART RATE: 58 BPM | TEMPERATURE: 97 F | DIASTOLIC BLOOD PRESSURE: 96 MMHG

## 2022-06-16 DIAGNOSIS — R20.0 NUMBNESS AND TINGLING IN BOTH HANDS: ICD-10-CM

## 2022-06-16 DIAGNOSIS — M43.6 NECK STIFFNESS: ICD-10-CM

## 2022-06-16 DIAGNOSIS — R68.89 LIGHT SENSITIVITY: ICD-10-CM

## 2022-06-16 DIAGNOSIS — R20.2 NUMBNESS AND TINGLING IN BOTH HANDS: ICD-10-CM

## 2022-06-16 DIAGNOSIS — R42 DIZZINESS: Primary | ICD-10-CM

## 2022-06-16 PROBLEM — I25.10 CORONARY ATHEROSCLEROSIS: Status: ACTIVE | Noted: 2022-06-16

## 2022-06-16 PROBLEM — E78.5 HYPERLIPIDEMIA: Status: ACTIVE | Noted: 2022-06-16

## 2022-06-16 PROCEDURE — 3077F SYST BP >= 140 MM HG: CPT | Performed by: NURSE PRACTITIONER

## 2022-06-16 PROCEDURE — 3080F DIAST BP >= 90 MM HG: CPT | Performed by: NURSE PRACTITIONER

## 2022-06-16 RX ORDER — ONDANSETRON 4 MG/1
TABLET, ORALLY DISINTEGRATING ORAL
COMMUNITY
Start: 2022-06-09

## 2022-06-21 ENCOUNTER — OFFICE VISIT (OUTPATIENT)
Dept: PHYSICAL THERAPY | Age: 62
End: 2022-06-21
Attending: FAMILY MEDICINE
Payer: COMMERCIAL

## 2022-06-21 ENCOUNTER — OFFICE VISIT (OUTPATIENT)
Dept: FAMILY MEDICINE CLINIC | Facility: CLINIC | Age: 62
End: 2022-06-21
Payer: COMMERCIAL

## 2022-06-21 VITALS
WEIGHT: 263 LBS | HEIGHT: 68 IN | TEMPERATURE: 98 F | RESPIRATION RATE: 12 BRPM | DIASTOLIC BLOOD PRESSURE: 80 MMHG | HEART RATE: 90 BPM | OXYGEN SATURATION: 98 % | SYSTOLIC BLOOD PRESSURE: 120 MMHG | BODY MASS INDEX: 39.86 KG/M2

## 2022-06-21 DIAGNOSIS — R42 VERTIGO: ICD-10-CM

## 2022-06-21 DIAGNOSIS — R42 VERTIGO: Primary | ICD-10-CM

## 2022-06-21 DIAGNOSIS — I10 ESSENTIAL (PRIMARY) HYPERTENSION: ICD-10-CM

## 2022-06-21 PROCEDURE — 3074F SYST BP LT 130 MM HG: CPT | Performed by: FAMILY MEDICINE

## 2022-06-21 PROCEDURE — 99214 OFFICE O/P EST MOD 30 MIN: CPT | Performed by: FAMILY MEDICINE

## 2022-06-21 PROCEDURE — 3008F BODY MASS INDEX DOCD: CPT | Performed by: FAMILY MEDICINE

## 2022-06-21 PROCEDURE — 3079F DIAST BP 80-89 MM HG: CPT | Performed by: FAMILY MEDICINE

## 2022-06-21 PROCEDURE — 97163 PT EVAL HIGH COMPLEX 45 MIN: CPT

## 2022-06-21 RX ORDER — PREDNISONE 20 MG/1
TABLET ORAL
COMMUNITY
Start: 2022-06-09

## 2022-06-22 ENCOUNTER — TELEPHONE (OUTPATIENT)
Dept: PHYSICAL THERAPY | Facility: HOSPITAL | Age: 62
End: 2022-06-22

## 2022-06-22 DIAGNOSIS — I10 ESSENTIAL (PRIMARY) HYPERTENSION: ICD-10-CM

## 2022-06-22 RX ORDER — CARVEDILOL 12.5 MG/1
12.5 TABLET ORAL 2 TIMES DAILY WITH MEALS
Qty: 180 TABLET | Refills: 0 | Status: SHIPPED | OUTPATIENT
Start: 2022-06-22

## 2022-06-22 NOTE — TELEPHONE ENCOUNTER
Pharmacy requesting 90 day supply.      LOV  6/22/22    LAST LAB   4/16/21    LAST RX   6/7/22     Next OV     Future Appointments   Date Time Provider Jania Montoya   6/24/2022  1:15 PM CHANDA Santana [place]       PROTOCOL  Hypertension Medications Protocol Failed 06/22/2022 01:03 PM   Protocol Details  CMP or BMP in past 12 months    Last serum creatinine< 2.0    Appointment in past 6 or next 3 months

## 2022-06-28 ENCOUNTER — OFFICE VISIT (OUTPATIENT)
Dept: PHYSICAL THERAPY | Age: 62
End: 2022-06-28
Attending: FAMILY MEDICINE
Payer: COMMERCIAL

## 2022-06-28 PROCEDURE — 97112 NEUROMUSCULAR REEDUCATION: CPT

## 2022-07-07 RX ORDER — LEVOTHYROXINE SODIUM 0.12 MG/1
TABLET ORAL
Qty: 90 TABLET | Refills: 0 | Status: SHIPPED | OUTPATIENT
Start: 2022-07-07

## 2022-07-07 NOTE — TELEPHONE ENCOUNTER
Thyroid Supplements Protocol Failed 07/06/2022 01:31 PM   Protocol Details  TSH test in past 12 months    TSH value between 0.350 and 5.500 IU/ml    Appointment in past 12 or next 3 months     Last refill - 3/1/22 - #90   Last TSH - 4/16/21 - 0.745  Last office visit - 6/21/22

## 2022-07-11 RX ORDER — ATORVASTATIN CALCIUM 40 MG/1
TABLET, FILM COATED ORAL
Qty: 30 TABLET | Refills: 0 | Status: SHIPPED | OUTPATIENT
Start: 2022-07-11

## 2022-07-12 RX ORDER — ATORVASTATIN CALCIUM 40 MG/1
40 TABLET, FILM COATED ORAL NIGHTLY
Qty: 90 TABLET | Refills: 0 | Status: SHIPPED | OUTPATIENT
Start: 2022-07-12

## 2022-07-12 NOTE — TELEPHONE ENCOUNTER
Pt's atorvastatin was refilled yesterday for 30 days but per insurance she needs 90 day supply.     Please send

## 2022-08-02 ENCOUNTER — APPOINTMENT (OUTPATIENT)
Dept: PHYSICAL THERAPY | Age: 62
End: 2022-08-02
Attending: FAMILY MEDICINE
Payer: COMMERCIAL

## 2022-08-02 ENCOUNTER — TELEPHONE (OUTPATIENT)
Dept: PHYSICAL THERAPY | Facility: HOSPITAL | Age: 62
End: 2022-08-02

## 2022-08-05 ENCOUNTER — HOSPITAL ENCOUNTER (OUTPATIENT)
Age: 62
Discharge: HOME | End: 2022-08-05
Payer: COMMERCIAL

## 2022-08-05 ENCOUNTER — OFFICE VISIT (OUTPATIENT)
Dept: FAMILY MEDICINE CLINIC | Facility: CLINIC | Age: 62
End: 2022-08-05
Payer: COMMERCIAL

## 2022-08-05 ENCOUNTER — TELEPHONE (OUTPATIENT)
Dept: FAMILY MEDICINE CLINIC | Facility: CLINIC | Age: 62
End: 2022-08-05

## 2022-08-05 ENCOUNTER — APPOINTMENT (OUTPATIENT)
Dept: GENERAL RADIOLOGY | Age: 62
End: 2022-08-05
Attending: NURSE PRACTITIONER
Payer: COMMERCIAL

## 2022-08-05 VITALS
TEMPERATURE: 99 F | BODY MASS INDEX: 37.89 KG/M2 | HEIGHT: 68 IN | WEIGHT: 250 LBS | HEART RATE: 72 BPM | DIASTOLIC BLOOD PRESSURE: 87 MMHG | OXYGEN SATURATION: 98 % | RESPIRATION RATE: 22 BRPM | SYSTOLIC BLOOD PRESSURE: 161 MMHG

## 2022-08-05 VITALS
BODY MASS INDEX: 37.89 KG/M2 | WEIGHT: 250 LBS | HEIGHT: 68 IN | RESPIRATION RATE: 18 BRPM | DIASTOLIC BLOOD PRESSURE: 88 MMHG | SYSTOLIC BLOOD PRESSURE: 138 MMHG | TEMPERATURE: 97 F | OXYGEN SATURATION: 97 % | HEART RATE: 74 BPM

## 2022-08-05 DIAGNOSIS — J45.901 MILD ASTHMA WITH ACUTE EXACERBATION, UNSPECIFIED WHETHER PERSISTENT: ICD-10-CM

## 2022-08-05 DIAGNOSIS — R05.1 ACUTE COUGH: ICD-10-CM

## 2022-08-05 DIAGNOSIS — J45.41 MODERATE PERSISTENT ASTHMA WITH EXACERBATION: ICD-10-CM

## 2022-08-05 DIAGNOSIS — J02.0 STREPTOCOCCAL SORE THROAT: Primary | ICD-10-CM

## 2022-08-05 DIAGNOSIS — Z20.822 SUSPECTED COVID-19 VIRUS INFECTION: ICD-10-CM

## 2022-08-05 DIAGNOSIS — Z02.9 ENCOUNTERS FOR ADMINISTRATIVE PURPOSES: Primary | ICD-10-CM

## 2022-08-05 LAB
S PYO AG THROAT QL: POSITIVE
SARS-COV-2 RNA RESP QL NAA+PROBE: NOT DETECTED

## 2022-08-05 PROCEDURE — 3075F SYST BP GE 130 - 139MM HG: CPT | Performed by: FAMILY MEDICINE

## 2022-08-05 PROCEDURE — 71046 X-RAY EXAM CHEST 2 VIEWS: CPT | Performed by: NURSE PRACTITIONER

## 2022-08-05 PROCEDURE — 3079F DIAST BP 80-89 MM HG: CPT | Performed by: FAMILY MEDICINE

## 2022-08-05 PROCEDURE — 94640 AIRWAY INHALATION TREATMENT: CPT | Performed by: NURSE PRACTITIONER

## 2022-08-05 PROCEDURE — 87880 STREP A ASSAY W/OPTIC: CPT | Performed by: NURSE PRACTITIONER

## 2022-08-05 PROCEDURE — 99213 OFFICE O/P EST LOW 20 MIN: CPT | Performed by: NURSE PRACTITIONER

## 2022-08-05 PROCEDURE — U0002 COVID-19 LAB TEST NON-CDC: HCPCS | Performed by: NURSE PRACTITIONER

## 2022-08-05 PROCEDURE — 3079F DIAST BP 80-89 MM HG: CPT | Performed by: NURSE PRACTITIONER

## 2022-08-05 PROCEDURE — 3077F SYST BP >= 140 MM HG: CPT | Performed by: NURSE PRACTITIONER

## 2022-08-05 PROCEDURE — 3008F BODY MASS INDEX DOCD: CPT | Performed by: NURSE PRACTITIONER

## 2022-08-05 PROCEDURE — 3008F BODY MASS INDEX DOCD: CPT | Performed by: FAMILY MEDICINE

## 2022-08-05 RX ORDER — IPRATROPIUM BROMIDE AND ALBUTEROL SULFATE 2.5; .5 MG/3ML; MG/3ML
3 SOLUTION RESPIRATORY (INHALATION) ONCE
Status: COMPLETED | OUTPATIENT
Start: 2022-08-05 | End: 2022-08-05

## 2022-08-05 RX ORDER — ONDANSETRON 4 MG/1
4 TABLET, ORALLY DISINTEGRATING ORAL EVERY 4 HOURS PRN
Qty: 10 TABLET | Refills: 0 | Status: SHIPPED | OUTPATIENT
Start: 2022-08-05 | End: 2022-08-12

## 2022-08-05 RX ORDER — PREDNISONE 20 MG/1
40 TABLET ORAL ONCE
Status: COMPLETED | OUTPATIENT
Start: 2022-08-05 | End: 2022-08-05

## 2022-08-05 RX ORDER — CLINDAMYCIN HYDROCHLORIDE 300 MG/1
300 CAPSULE ORAL 3 TIMES DAILY
Qty: 30 CAPSULE | Refills: 0 | Status: SHIPPED | OUTPATIENT
Start: 2022-08-05 | End: 2022-08-15

## 2022-08-05 RX ORDER — ALBUTEROL SULFATE 2.5 MG/3ML
2.5 SOLUTION RESPIRATORY (INHALATION) EVERY 4 HOURS PRN
Qty: 30 EACH | Refills: 0 | Status: SHIPPED | OUTPATIENT
Start: 2022-08-05 | End: 2022-09-04

## 2022-08-05 RX ORDER — BENZONATATE 100 MG/1
100 CAPSULE ORAL 3 TIMES DAILY PRN
Qty: 30 CAPSULE | Refills: 0 | Status: SHIPPED | OUTPATIENT
Start: 2022-08-05 | End: 2022-09-04

## 2022-08-05 NOTE — ED INITIAL ASSESSMENT (HPI)
Pt here w/ multiple days of cough and ARIANNA. Has been doing her nebs every 3-4 hours. Started steroids she had at home, 20mg dose PTA. Hoarse voice and tightness w/ breathing.

## 2022-08-05 NOTE — TELEPHONE ENCOUNTER
Patient states has been sick since Monday, using albuterol nebs every 4 hours, started prednisone that she had at home and using tessalon perles for cough. No fever. Patient states feels SOB and 02 sats are running 92 - 95. Home covid tests are negative. Patient requesting antibiotic. No openings in office today and Dr. Saima Badillo is not in office. Advised to be evaluated either at [facility] or ER. Patient verbalized understanding.

## 2022-08-05 NOTE — TELEPHONE ENCOUNTER
Pt has been sick since Monday. She has no voice. She has been doing her treatments because she has asthma but she is having a hard time breathing.

## 2022-08-23 ENCOUNTER — TELEPHONE (OUTPATIENT)
Dept: FAMILY MEDICINE CLINIC | Facility: CLINIC | Age: 62
End: 2022-08-23

## 2022-08-23 NOTE — TELEPHONE ENCOUNTER
Left msg on identified voicemail that there are no openings tomorrow in the office. Advised per Dr. Denver Paling to be evaluated in the . To call back if questions.

## 2022-08-23 NOTE — TELEPHONE ENCOUNTER
She has been sick for 3 weeks now, she is not getting any better. Still has a sore throat. Wanted to be seen tomorrow. No openings with anyone.

## 2022-08-25 ENCOUNTER — HOSPITAL ENCOUNTER (OUTPATIENT)
Age: 62
Discharge: HOME | End: 2022-08-25
Payer: COMMERCIAL

## 2022-08-25 VITALS
OXYGEN SATURATION: 99 % | HEIGHT: 68 IN | TEMPERATURE: 97 F | DIASTOLIC BLOOD PRESSURE: 65 MMHG | RESPIRATION RATE: 20 BRPM | HEART RATE: 66 BPM | SYSTOLIC BLOOD PRESSURE: 144 MMHG | BODY MASS INDEX: 37.89 KG/M2 | WEIGHT: 250 LBS

## 2022-08-25 DIAGNOSIS — J45.901 MODERATE ASTHMA WITH EXACERBATION, UNSPECIFIED WHETHER PERSISTENT: ICD-10-CM

## 2022-08-25 DIAGNOSIS — J06.9 UPPER RESPIRATORY INFECTION: Primary | ICD-10-CM

## 2022-08-25 LAB
S PYO AG THROAT QL: NEGATIVE
SARS-COV-2 RNA RESP QL NAA+PROBE: NOT DETECTED

## 2022-08-25 PROCEDURE — 87880 STREP A ASSAY W/OPTIC: CPT | Performed by: PHYSICIAN ASSISTANT

## 2022-08-25 PROCEDURE — 96372 THER/PROPH/DIAG INJ SC/IM: CPT | Performed by: PHYSICIAN ASSISTANT

## 2022-08-25 PROCEDURE — U0002 COVID-19 LAB TEST NON-CDC: HCPCS | Performed by: PHYSICIAN ASSISTANT

## 2022-08-25 PROCEDURE — 94640 AIRWAY INHALATION TREATMENT: CPT | Performed by: PHYSICIAN ASSISTANT

## 2022-08-25 PROCEDURE — 99213 OFFICE O/P EST LOW 20 MIN: CPT | Performed by: PHYSICIAN ASSISTANT

## 2022-08-25 RX ORDER — IPRATROPIUM BROMIDE AND ALBUTEROL SULFATE 2.5; .5 MG/3ML; MG/3ML
3 SOLUTION RESPIRATORY (INHALATION) ONCE
Status: COMPLETED | OUTPATIENT
Start: 2022-08-25 | End: 2022-08-25

## 2022-08-25 RX ORDER — ALBUTEROL SULFATE 2.5 MG/3ML
5 SOLUTION RESPIRATORY (INHALATION) ONCE
Status: COMPLETED | OUTPATIENT
Start: 2022-08-25 | End: 2022-08-25

## 2022-08-25 RX ORDER — METHYLPREDNISOLONE SODIUM SUCCINATE 125 MG/2ML
125 INJECTION, POWDER, LYOPHILIZED, FOR SOLUTION INTRAMUSCULAR; INTRAVENOUS ONCE
Status: COMPLETED | OUTPATIENT
Start: 2022-08-25 | End: 2022-08-25

## 2022-08-25 RX ORDER — PREDNISONE 10 MG/1
TABLET ORAL
Qty: 29 TABLET | Refills: 0 | Status: SHIPPED | OUTPATIENT
Start: 2022-08-25

## 2022-08-25 NOTE — ED INITIAL ASSESSMENT (HPI)
Pt with co sorethroat. Recent dx and took clindamycin. Disposed of toothbrush. Pt states she is having cleopatra. Pt has been using neb treatments at home with no relief.

## 2022-08-26 ENCOUNTER — TELEPHONE (OUTPATIENT)
Dept: FAMILY MEDICINE CLINIC | Facility: CLINIC | Age: 62
End: 2022-08-26

## 2022-08-26 NOTE — TELEPHONE ENCOUNTER
Patient asking for labs to be done prior to appt on Monday with Dr. Ena Presley at 11:00. Soonest lab appt that day is at 0915, patient states will wait until appt with Dr. Ena Presley.

## 2022-08-26 NOTE — TELEPHONE ENCOUNTER
Pt would like lab ordered. She also wanted a quantiferon ordered also. She has had a cough for months & she does work with a lot of clients. Please call her when labs are ordered.

## 2022-08-29 ENCOUNTER — LABORATORY ENCOUNTER (OUTPATIENT)
Dept: LAB | Age: 62
End: 2022-08-29
Attending: FAMILY MEDICINE
Payer: COMMERCIAL

## 2022-08-29 ENCOUNTER — OFFICE VISIT (OUTPATIENT)
Dept: FAMILY MEDICINE CLINIC | Facility: CLINIC | Age: 62
End: 2022-08-29
Payer: COMMERCIAL

## 2022-08-29 VITALS
BODY MASS INDEX: 37.89 KG/M2 | HEART RATE: 53 BPM | DIASTOLIC BLOOD PRESSURE: 88 MMHG | OXYGEN SATURATION: 97 % | HEIGHT: 68 IN | TEMPERATURE: 98 F | SYSTOLIC BLOOD PRESSURE: 136 MMHG | WEIGHT: 250 LBS | RESPIRATION RATE: 12 BRPM

## 2022-08-29 DIAGNOSIS — I10 ESSENTIAL HYPERTENSION: ICD-10-CM

## 2022-08-29 DIAGNOSIS — J45.41 MODERATE PERSISTENT ASTHMA WITH EXACERBATION: Primary | ICD-10-CM

## 2022-08-29 DIAGNOSIS — Z20.1 EXPOSURE TO TB: ICD-10-CM

## 2022-08-29 DIAGNOSIS — J30.89 SEASONAL ALLERGIC RHINITIS DUE TO OTHER ALLERGIC TRIGGER: ICD-10-CM

## 2022-08-29 PROCEDURE — 3075F SYST BP GE 130 - 139MM HG: CPT | Performed by: FAMILY MEDICINE

## 2022-08-29 PROCEDURE — 99214 OFFICE O/P EST MOD 30 MIN: CPT | Performed by: FAMILY MEDICINE

## 2022-08-29 PROCEDURE — 3008F BODY MASS INDEX DOCD: CPT | Performed by: FAMILY MEDICINE

## 2022-08-29 PROCEDURE — 3079F DIAST BP 80-89 MM HG: CPT | Performed by: FAMILY MEDICINE

## 2022-08-29 RX ORDER — AZELASTINE 1 MG/ML
2 SPRAY, METERED NASAL 2 TIMES DAILY
Qty: 1 EACH | Refills: 0 | Status: SHIPPED | OUTPATIENT
Start: 2022-08-29

## 2022-08-29 RX ORDER — MONTELUKAST SODIUM 10 MG/1
10 TABLET ORAL DAILY
Qty: 90 TABLET | Refills: 3 | Status: SHIPPED | OUTPATIENT
Start: 2022-08-29 | End: 2023-08-24

## 2022-08-31 LAB
M TB IFN-G CD4+ T-CELLS BLD-ACNC: 0.02 IU/ML
M TB TUBERC IFN-G BLD QL: NEGATIVE
M TB TUBERC IGNF/MITOGEN IGNF CONTROL: >10 IU/ML
QFT TB1 AG MINUS NIL: 0 IU/ML
QFT TB2 AG MINUS NIL: -0.01 IU/ML

## 2022-10-24 ENCOUNTER — TELEPHONE (OUTPATIENT)
Dept: FAMILY MEDICINE CLINIC | Facility: CLINIC | Age: 62
End: 2022-10-24

## 2022-10-24 DIAGNOSIS — Z00.00 ROUTINE HEALTH MAINTENANCE: Primary | ICD-10-CM

## 2022-10-24 NOTE — TELEPHONE ENCOUNTER
SAID SHE IS DUE FOR LABS, NO ORDERS IN COMPUTER, CALL PT WHEN ORDERS HAVE BEEN PLACED SO SHE CAN MAKE APPT

## 2022-10-25 NOTE — TELEPHONE ENCOUNTER
Pt also wanting to make appt for mammogram and physical, routing message to DS to place order for mammogram. Will call pt back when order in.

## 2022-10-26 ENCOUNTER — LABORATORY ENCOUNTER (OUTPATIENT)
Dept: LAB | Age: 62
End: 2022-10-26
Attending: FAMILY MEDICINE
Payer: COMMERCIAL

## 2022-10-26 DIAGNOSIS — Z00.00 ROUTINE HEALTH MAINTENANCE: ICD-10-CM

## 2022-10-26 LAB
ALBUMIN SERPL-MCNC: 3.6 G/DL (ref 3.4–5)
ALBUMIN/GLOB SERPL: 1 {RATIO} (ref 1–2)
ALP LIVER SERPL-CCNC: 101 U/L
ALT SERPL-CCNC: 21 U/L
ANION GAP SERPL CALC-SCNC: 6 MMOL/L (ref 0–18)
AST SERPL-CCNC: 20 U/L (ref 15–37)
BASOPHILS # BLD AUTO: 0.06 X10(3) UL (ref 0–0.2)
BASOPHILS NFR BLD AUTO: 1.1 %
BILIRUB SERPL-MCNC: 0.6 MG/DL (ref 0.1–2)
BUN BLD-MCNC: 14 MG/DL (ref 7–18)
CALCIUM BLD-MCNC: 8.8 MG/DL (ref 8.5–10.1)
CHLORIDE SERPL-SCNC: 112 MMOL/L (ref 98–112)
CHOLEST SERPL-MCNC: 170 MG/DL (ref ?–200)
CO2 SERPL-SCNC: 24 MMOL/L (ref 21–32)
CREAT BLD-MCNC: 0.98 MG/DL
EOSINOPHIL # BLD AUTO: 0.2 X10(3) UL (ref 0–0.7)
EOSINOPHIL NFR BLD AUTO: 3.6 %
ERYTHROCYTE [DISTWIDTH] IN BLOOD BY AUTOMATED COUNT: 13.4 %
FASTING PATIENT LIPID ANSWER: YES
FASTING STATUS PATIENT QL REPORTED: YES
GFR SERPLBLD BASED ON 1.73 SQ M-ARVRAT: 65 ML/MIN/1.73M2 (ref 60–?)
GLOBULIN PLAS-MCNC: 3.7 G/DL (ref 2.8–4.4)
GLUCOSE BLD-MCNC: 105 MG/DL (ref 70–99)
HCT VFR BLD AUTO: 41.6 %
HDLC SERPL-MCNC: 40 MG/DL (ref 40–59)
HGB BLD-MCNC: 13.3 G/DL
IMM GRANULOCYTES # BLD AUTO: 0.01 X10(3) UL (ref 0–1)
IMM GRANULOCYTES NFR BLD: 0.2 %
LDLC SERPL CALC-MCNC: 113 MG/DL (ref ?–100)
LYMPHOCYTES # BLD AUTO: 1.79 X10(3) UL (ref 1–4)
LYMPHOCYTES NFR BLD AUTO: 32.1 %
MCH RBC QN AUTO: 27.8 PG (ref 26–34)
MCHC RBC AUTO-ENTMCNC: 32 G/DL (ref 31–37)
MCV RBC AUTO: 87 FL
MONOCYTES # BLD AUTO: 0.7 X10(3) UL (ref 0.1–1)
MONOCYTES NFR BLD AUTO: 12.6 %
NEUTROPHILS # BLD AUTO: 2.81 X10 (3) UL (ref 1.5–7.7)
NEUTROPHILS # BLD AUTO: 2.81 X10(3) UL (ref 1.5–7.7)
NEUTROPHILS NFR BLD AUTO: 50.4 %
NONHDLC SERPL-MCNC: 130 MG/DL (ref ?–130)
OSMOLALITY SERPL CALC.SUM OF ELEC: 295 MOSM/KG (ref 275–295)
PLATELET # BLD AUTO: 212 10(3)UL (ref 150–450)
POTASSIUM SERPL-SCNC: 3.9 MMOL/L (ref 3.5–5.1)
PROT SERPL-MCNC: 7.3 G/DL (ref 6.4–8.2)
RBC # BLD AUTO: 4.78 X10(6)UL
SODIUM SERPL-SCNC: 142 MMOL/L (ref 136–145)
TRIGL SERPL-MCNC: 94 MG/DL (ref 30–149)
TSI SER-ACNC: 1.91 MIU/ML (ref 0.36–3.74)
VLDLC SERPL CALC-MCNC: 16 MG/DL (ref 0–30)
WBC # BLD AUTO: 5.6 X10(3) UL (ref 4–11)

## 2022-10-26 PROCEDURE — 80061 LIPID PANEL: CPT | Performed by: FAMILY MEDICINE

## 2022-10-26 PROCEDURE — 80050 GENERAL HEALTH PANEL: CPT | Performed by: FAMILY MEDICINE

## 2022-10-31 DIAGNOSIS — I10 ESSENTIAL (PRIMARY) HYPERTENSION: ICD-10-CM

## 2022-10-31 RX ORDER — LEVOTHYROXINE SODIUM 0.12 MG/1
TABLET ORAL
Qty: 90 TABLET | Refills: 1 | Status: SHIPPED | OUTPATIENT
Start: 2022-10-31

## 2022-10-31 RX ORDER — CARVEDILOL 12.5 MG/1
12.5 TABLET ORAL 2 TIMES DAILY WITH MEALS
Qty: 180 TABLET | Refills: 1 | Status: SHIPPED | OUTPATIENT
Start: 2022-10-31

## 2022-10-31 RX ORDER — ATORVASTATIN CALCIUM 40 MG/1
TABLET, FILM COATED ORAL
Qty: 90 TABLET | Refills: 1 | Status: SHIPPED | OUTPATIENT
Start: 2022-10-31

## 2022-10-31 NOTE — TELEPHONE ENCOUNTER
Hypertension Medications Protocol Passed 10/31/2022 02:36 AM   Protocol Details  CMP or BMP in past 12 months    Last serum creatinine< 2.0    Appointment in past 6 or next 3 months     Last refill - Carvedilol - 6/22/22 - #180   Last CMP- 10/26/22 - creatinine - 0.98  Last office visit - 8/29/22  Refilled per protocol    Cholesterol Medication Protocol Passed 10/31/2022 02:36 AM   Protocol Details  ALT < 80    ALT resulted within past year    Lipid panel within past 12 months    Appointment within past 12 or next 3 months     Last refill - Atorvastatin - 7/12/22 - #90   Last ALT - 10/26/22 - 21  Last lipid panel - 10/26/22   Last office visit - 8/29/22  Refilled per protocol

## 2022-10-31 NOTE — TELEPHONE ENCOUNTER
Thyroid Supplements Protocol Passed 10/30/2022 11:54 AM   Protocol Details  TSH test in past 12 months    TSH value between 0.350 and 5.500 IU/ml    Appointment in past 12 or next 3 months        Last refill - 7/7/22 - #90   Last TSH - 10/26/22 - 1.910  Last office visit - 8/29/22  Refilled per protocol

## 2022-11-03 DIAGNOSIS — E78.00 PURE HYPERCHOLESTEROLEMIA: ICD-10-CM

## 2022-11-03 DIAGNOSIS — E03.9 ACQUIRED HYPOTHYROIDISM: Primary | ICD-10-CM

## 2023-02-21 ENCOUNTER — HOSPITAL ENCOUNTER (OUTPATIENT)
Dept: GENERAL RADIOLOGY | Age: 63
Discharge: HOME | End: 2023-02-21
Attending: FAMILY MEDICINE
Payer: COMMERCIAL

## 2023-02-21 ENCOUNTER — OFFICE VISIT (OUTPATIENT)
Dept: FAMILY MEDICINE CLINIC | Facility: CLINIC | Age: 63
End: 2023-02-21
Payer: COMMERCIAL

## 2023-02-21 VITALS
HEIGHT: 68 IN | HEART RATE: 72 BPM | SYSTOLIC BLOOD PRESSURE: 138 MMHG | DIASTOLIC BLOOD PRESSURE: 88 MMHG | WEIGHT: 250 LBS | RESPIRATION RATE: 12 BRPM | TEMPERATURE: 97 F | BODY MASS INDEX: 37.89 KG/M2

## 2023-02-21 DIAGNOSIS — R07.81 RIB PAIN ON RIGHT SIDE: Primary | ICD-10-CM

## 2023-02-21 DIAGNOSIS — R07.81 RIB PAIN ON RIGHT SIDE: ICD-10-CM

## 2023-02-21 DIAGNOSIS — S29.012A UPPER BACK STRAIN, INITIAL ENCOUNTER: ICD-10-CM

## 2023-02-21 PROCEDURE — 3008F BODY MASS INDEX DOCD: CPT | Performed by: FAMILY MEDICINE

## 2023-02-21 PROCEDURE — 71101 X-RAY EXAM UNILAT RIBS/CHEST: CPT | Performed by: FAMILY MEDICINE

## 2023-02-21 PROCEDURE — 3079F DIAST BP 80-89 MM HG: CPT | Performed by: FAMILY MEDICINE

## 2023-02-21 PROCEDURE — 96372 THER/PROPH/DIAG INJ SC/IM: CPT | Performed by: FAMILY MEDICINE

## 2023-02-21 PROCEDURE — 99214 OFFICE O/P EST MOD 30 MIN: CPT | Performed by: FAMILY MEDICINE

## 2023-02-21 PROCEDURE — 3075F SYST BP GE 130 - 139MM HG: CPT | Performed by: FAMILY MEDICINE

## 2023-02-21 RX ORDER — KETOROLAC TROMETHAMINE 30 MG/ML
60 INJECTION, SOLUTION INTRAMUSCULAR; INTRAVENOUS ONCE
Status: COMPLETED | OUTPATIENT
Start: 2023-02-21 | End: 2023-02-21

## 2023-02-21 RX ORDER — CYCLOBENZAPRINE HCL 10 MG
10 TABLET ORAL NIGHTLY
Qty: 30 TABLET | Refills: 1 | Status: SHIPPED | OUTPATIENT
Start: 2023-02-21 | End: 2023-03-13

## 2023-02-21 RX ORDER — KETOROLAC TROMETHAMINE 10 MG/1
10 TABLET, FILM COATED ORAL EVERY 6 HOURS PRN
Qty: 24 TABLET | Refills: 0 | Status: SHIPPED | OUTPATIENT
Start: 2023-02-21 | End: 2023-02-26

## 2023-02-21 RX ADMIN — KETOROLAC TROMETHAMINE 60 MG: 30 INJECTION, SOLUTION INTRAMUSCULAR; INTRAVENOUS at 15:30:00

## 2023-03-08 DIAGNOSIS — I10 ESSENTIAL HYPERTENSION: ICD-10-CM

## 2023-03-08 DIAGNOSIS — J30.89 SEASONAL ALLERGIC RHINITIS DUE TO OTHER ALLERGIC TRIGGER: ICD-10-CM

## 2023-03-08 DIAGNOSIS — J45.41 MODERATE PERSISTENT ASTHMA WITH EXACERBATION: ICD-10-CM

## 2023-03-08 RX ORDER — MONTELUKAST SODIUM 10 MG/1
10 TABLET ORAL DAILY
Qty: 90 TABLET | Refills: 1 | Status: SHIPPED | OUTPATIENT
Start: 2023-03-08 | End: 2024-03-02

## 2023-03-08 RX ORDER — TORSEMIDE 20 MG/1
20 TABLET ORAL DAILY
Qty: 90 TABLET | Refills: 1 | Status: SHIPPED | OUTPATIENT
Start: 2023-03-08

## 2023-03-08 NOTE — TELEPHONE ENCOUNTER
LOV 2/21/23  Last refill singulair 8/29/22 #90 3 ref         Torsemide 1/31/22 #90 0 ref  No future appointments.

## 2023-03-08 NOTE — TELEPHONE ENCOUNTER
Torsomide, singular, [facility].  These were supposed to be filled when she was here last, pharmacy has nothing

## 2023-04-12 ENCOUNTER — PATIENT OUTREACH (OUTPATIENT)
Dept: FAMILY MEDICINE CLINIC | Facility: CLINIC | Age: 63
End: 2023-04-12

## 2023-05-03 DIAGNOSIS — I10 ESSENTIAL (PRIMARY) HYPERTENSION: ICD-10-CM

## 2023-05-03 RX ORDER — ATORVASTATIN CALCIUM 40 MG/1
TABLET, FILM COATED ORAL
Qty: 90 TABLET | Refills: 0 | Status: SHIPPED | OUTPATIENT
Start: 2023-05-03

## 2023-05-03 RX ORDER — LEVOTHYROXINE SODIUM 0.12 MG/1
TABLET ORAL
Qty: 90 TABLET | Refills: 0 | Status: SHIPPED | OUTPATIENT
Start: 2023-05-03

## 2023-05-03 RX ORDER — CARVEDILOL 12.5 MG/1
TABLET ORAL
Qty: 180 TABLET | Refills: 0 | Status: SHIPPED | OUTPATIENT
Start: 2023-05-03

## 2023-05-03 NOTE — TELEPHONE ENCOUNTER
CARVEDILOL 12.5 MG Oral Tab    LOV  8-29-22    LAST LAB  10-26-22 Chem Profile                         Creatinine  0.98    LAST RX  10-31-22 #180  RF 1    Next OV  No future appointments. PROTOCOL  Hypertension Medications Protocol Passed 05/03/2023 12:53 AM   Protocol Details  CMP or BMP in past 12 months    Last serum creatinine< 2.0    Appointment in past 6 or next 3 months     LEVOTHYROXINE 125 MCG Oral Tab    LOV  8-29-22    LAST LAB  10-26-22  TSH  1.910    LAST RX  10-31-22  #90  RF 1    Next OV  No future appointments. PROTOCOL  Thyroid Supplements Protocol Passed 05/03/2023 12:53 AM   Protocol Details  TSH test in past 12 months    TSH value between 0.350 and 5.500 IU/ml    Appointment in past 12 or next 3 months     ATORVASTATIN 40 MG Oral Tab     LOV  8-29-22    LAST LAB  10-26-22 Chem Profile  ALT 21                    10-26-22  Lipids    LAST RX  10-31-22  #90  RF 1    Next OV  No future appointments. PROTOCOL  Cholesterol Medication Protocol Passed 05/03/2023 12:53 AM   Protocol Details  ALT < 80    ALT resulted within past year    Lipid panel within past 12 months    Appointment within past 12 or next 3 months       Left message on designated voicemail to contact office to schedule an appointment.

## 2023-05-12 ENCOUNTER — TELEPHONE (OUTPATIENT)
Dept: FAMILY MEDICINE CLINIC | Facility: CLINIC | Age: 63
End: 2023-05-12

## 2023-05-12 DIAGNOSIS — R07.81 RIB PAIN ON RIGHT SIDE: ICD-10-CM

## 2023-05-12 RX ORDER — CYCLOBENZAPRINE HCL 10 MG
TABLET ORAL
Qty: 30 TABLET | Refills: 1 | Status: SHIPPED | OUTPATIENT
Start: 2023-05-12

## 2023-05-12 NOTE — TELEPHONE ENCOUNTER
Last refill 2/21/23 #30 1 ref  LOV2/21/23  No future appointments.   OK per DS to refill, refill sent

## 2023-05-17 ENCOUNTER — TELEPHONE (OUTPATIENT)
Dept: FAMILY MEDICINE CLINIC | Facility: CLINIC | Age: 63
End: 2023-05-17

## 2023-05-17 NOTE — TELEPHONE ENCOUNTER
PC to [facility], rx sent on 5/12 hasn't been filled there. Called [facility], pt picked up refill there on 5/12. Attempted to call pt to see if she wanted the rx to go to [facility] or not because fax request for flexeril rx rec'd from [facility]. Pt unavailable at this time, will f/u with pt on Friday when this RN back in office.

## 2023-05-19 NOTE — TELEPHONE ENCOUNTER
[facility] sent to pt asking dheeraj de la paz to call office when next refill due to notify us of where rx should go to.

## 2023-07-03 ENCOUNTER — TELEPHONE (OUTPATIENT)
Dept: FAMILY MEDICINE CLINIC | Facility: CLINIC | Age: 63
End: 2023-07-03

## 2023-07-06 ENCOUNTER — TELEPHONE (OUTPATIENT)
Dept: FAMILY MEDICINE CLINIC | Facility: CLINIC | Age: 63
End: 2023-07-06

## 2023-07-06 DIAGNOSIS — Z12.31 ENCOUNTER FOR SCREENING MAMMOGRAM FOR BREAST CANCER: Primary | ICD-10-CM

## 2023-07-06 DIAGNOSIS — Z00.00 LABORATORY EXAMINATION ORDERED AS PART OF A ROUTINE GENERAL MEDICAL EXAMINATION: ICD-10-CM

## 2023-07-06 DIAGNOSIS — Z13.1 SCREENING FOR DIABETES MELLITUS (DM): ICD-10-CM

## 2023-07-06 DIAGNOSIS — Z13.0 SCREENING, IRON DEFICIENCY ANEMIA: ICD-10-CM

## 2023-07-06 DIAGNOSIS — Z13.29 SCREENING FOR THYROID DISORDER: ICD-10-CM

## 2023-07-06 DIAGNOSIS — E78.5 DYSLIPIDEMIA: ICD-10-CM

## 2023-07-06 NOTE — TELEPHONE ENCOUNTER
Last labs were done 10/26/22   Last mammogram was ordered 10/25/22 but not done? Last screening mammogram visible in [facility] was done 7/26/18.

## 2023-07-06 NOTE — TELEPHONE ENCOUNTER
PT HAS PHYSICAL APPT 7/28, WANTS TO GET LABS DONE PRIOR TO THIS APPT, CALL PT WHEN ORDERS ARE IN SO SHE CAN SCHEDULE, ALSO WANTS MAMMO ORDER PUT IN SO SHE CAN SCHEDULE FOR [facility] Blanchard

## 2023-07-07 NOTE — TELEPHONE ENCOUNTER
Patient advised. Lab appointment scheduled. Number for [facility] Scheduling given.    Future Appointments   Date Time Provider Janai Montoya   7/10/2023  8:00 AM REF [facility] REF [facility] Ref Lab [facility]   7/28/2023 11:00 AM Escobar Tamez,  [facility] Juliann Craw

## 2023-07-10 ENCOUNTER — LAB ENCOUNTER (OUTPATIENT)
Dept: LAB | Age: 63
End: 2023-07-10
Attending: FAMILY MEDICINE
Payer: COMMERCIAL

## 2023-07-10 DIAGNOSIS — Z13.29 SCREENING FOR THYROID DISORDER: ICD-10-CM

## 2023-07-10 DIAGNOSIS — Z13.1 SCREENING FOR DIABETES MELLITUS (DM): ICD-10-CM

## 2023-07-10 DIAGNOSIS — Z13.0 SCREENING, IRON DEFICIENCY ANEMIA: ICD-10-CM

## 2023-07-10 DIAGNOSIS — E78.00 PURE HYPERCHOLESTEROLEMIA: ICD-10-CM

## 2023-07-10 DIAGNOSIS — Z00.00 LABORATORY EXAMINATION ORDERED AS PART OF A ROUTINE GENERAL MEDICAL EXAMINATION: ICD-10-CM

## 2023-07-10 DIAGNOSIS — E78.5 DYSLIPIDEMIA: ICD-10-CM

## 2023-07-10 LAB
ALBUMIN SERPL-MCNC: 3.5 G/DL (ref 3.4–5)
ALBUMIN/GLOB SERPL: 1 {RATIO} (ref 1–2)
ALP LIVER SERPL-CCNC: 124 U/L
ALT SERPL-CCNC: 19 U/L
ANION GAP SERPL CALC-SCNC: 5 MMOL/L (ref 0–18)
AST SERPL-CCNC: 22 U/L (ref 15–37)
BASOPHILS # BLD AUTO: 0.06 X10(3) UL (ref 0–0.2)
BASOPHILS NFR BLD AUTO: 1.2 %
BILIRUB SERPL-MCNC: 0.5 MG/DL (ref 0.1–2)
BUN BLD-MCNC: 11 MG/DL (ref 7–18)
CALCIUM BLD-MCNC: 9.2 MG/DL (ref 8.5–10.1)
CHLORIDE SERPL-SCNC: 109 MMOL/L (ref 98–112)
CHOLEST SERPL-MCNC: 153 MG/DL (ref ?–200)
CO2 SERPL-SCNC: 26 MMOL/L (ref 21–32)
CREAT BLD-MCNC: 0.91 MG/DL
EOSINOPHIL # BLD AUTO: 0.18 X10(3) UL (ref 0–0.7)
EOSINOPHIL NFR BLD AUTO: 3.5 %
ERYTHROCYTE [DISTWIDTH] IN BLOOD BY AUTOMATED COUNT: 13.3 %
EST. AVERAGE GLUCOSE BLD GHB EST-MCNC: 123 MG/DL (ref 68–126)
FASTING PATIENT LIPID ANSWER: YES
FASTING STATUS PATIENT QL REPORTED: YES
GFR SERPLBLD BASED ON 1.73 SQ M-ARVRAT: 70 ML/MIN/1.73M2 (ref 60–?)
GLOBULIN PLAS-MCNC: 3.6 G/DL (ref 2.8–4.4)
GLUCOSE BLD-MCNC: 94 MG/DL (ref 70–99)
HBA1C MFR BLD: 5.9 % (ref ?–5.7)
HCT VFR BLD AUTO: 41.4 %
HDLC SERPL-MCNC: 42 MG/DL (ref 40–59)
HGB BLD-MCNC: 12.9 G/DL
IMM GRANULOCYTES # BLD AUTO: 0.02 X10(3) UL (ref 0–1)
IMM GRANULOCYTES NFR BLD: 0.4 %
LDLC SERPL CALC-MCNC: 86 MG/DL (ref ?–100)
LYMPHOCYTES # BLD AUTO: 1.72 X10(3) UL (ref 1–4)
LYMPHOCYTES NFR BLD AUTO: 33.9 %
MCH RBC QN AUTO: 26.5 PG (ref 26–34)
MCHC RBC AUTO-ENTMCNC: 31.2 G/DL (ref 31–37)
MCV RBC AUTO: 85.2 FL
MONOCYTES # BLD AUTO: 0.61 X10(3) UL (ref 0.1–1)
MONOCYTES NFR BLD AUTO: 12 %
NEUTROPHILS # BLD AUTO: 2.49 X10 (3) UL (ref 1.5–7.7)
NEUTROPHILS # BLD AUTO: 2.49 X10(3) UL (ref 1.5–7.7)
NEUTROPHILS NFR BLD AUTO: 49 %
NONHDLC SERPL-MCNC: 111 MG/DL (ref ?–130)
OSMOLALITY SERPL CALC.SUM OF ELEC: 289 MOSM/KG (ref 275–295)
PLATELET # BLD AUTO: 214 10(3)UL (ref 150–450)
POTASSIUM SERPL-SCNC: 4.1 MMOL/L (ref 3.5–5.1)
PROT SERPL-MCNC: 7.1 G/DL (ref 6.4–8.2)
RBC # BLD AUTO: 4.86 X10(6)UL
SODIUM SERPL-SCNC: 140 MMOL/L (ref 136–145)
T4 FREE SERPL-MCNC: 1.2 NG/DL (ref 0.8–1.7)
TRIGL SERPL-MCNC: 143 MG/DL (ref 30–149)
TSI SER-ACNC: 0.08 MIU/ML (ref 0.36–3.74)
VLDLC SERPL CALC-MCNC: 23 MG/DL (ref 0–30)
WBC # BLD AUTO: 5.1 X10(3) UL (ref 4–11)

## 2023-07-10 PROCEDURE — 80050 GENERAL HEALTH PANEL: CPT | Performed by: FAMILY MEDICINE

## 2023-07-10 PROCEDURE — 84481 FREE ASSAY (FT-3): CPT | Performed by: FAMILY MEDICINE

## 2023-07-10 PROCEDURE — 80061 LIPID PANEL: CPT | Performed by: FAMILY MEDICINE

## 2023-07-10 PROCEDURE — 84439 ASSAY OF FREE THYROXINE: CPT | Performed by: FAMILY MEDICINE

## 2023-07-10 PROCEDURE — 83036 HEMOGLOBIN GLYCOSYLATED A1C: CPT | Performed by: FAMILY MEDICINE

## 2023-07-11 LAB — T3FREE SERPL-MCNC: 2.6 PG/ML (ref 2.4–4.2)

## 2023-07-14 DIAGNOSIS — Z12.31 BREAST CANCER SCREENING BY MAMMOGRAM: Primary | ICD-10-CM

## 2023-07-28 ENCOUNTER — OFFICE VISIT (OUTPATIENT)
Dept: FAMILY MEDICINE CLINIC | Facility: CLINIC | Age: 63
End: 2023-07-28
Payer: COMMERCIAL

## 2023-07-28 VITALS
SYSTOLIC BLOOD PRESSURE: 136 MMHG | BODY MASS INDEX: 37.44 KG/M2 | DIASTOLIC BLOOD PRESSURE: 86 MMHG | WEIGHT: 247 LBS | HEIGHT: 68 IN | TEMPERATURE: 97 F

## 2023-07-28 DIAGNOSIS — K21.9 GASTROESOPHAGEAL REFLUX DISEASE, UNSPECIFIED WHETHER ESOPHAGITIS PRESENT: ICD-10-CM

## 2023-07-28 DIAGNOSIS — Z00.00 ROUTINE ADULT HEALTH MAINTENANCE: Primary | ICD-10-CM

## 2023-07-28 DIAGNOSIS — I25.10 ATHEROSCLEROSIS OF NATIVE CORONARY ARTERY OF NATIVE HEART WITHOUT ANGINA PECTORIS: ICD-10-CM

## 2023-07-28 DIAGNOSIS — I10 ESSENTIAL (PRIMARY) HYPERTENSION: ICD-10-CM

## 2023-07-28 DIAGNOSIS — M31.0 LEUCOCYTOCLASTIC VASCULITIS (HCC): ICD-10-CM

## 2023-07-28 DIAGNOSIS — J45.41 MODERATE PERSISTENT ASTHMA WITH EXACERBATION: ICD-10-CM

## 2023-07-28 DIAGNOSIS — G90.521 REFLEX SYMPATHETIC DYSTROPHY OF RIGHT LOWER EXTREMITY: ICD-10-CM

## 2023-07-28 DIAGNOSIS — I21.4 NSTEMI (NON-ST ELEVATED MYOCARDIAL INFARCTION) (HCC): ICD-10-CM

## 2023-07-28 DIAGNOSIS — Z12.11 COLON CANCER SCREENING: ICD-10-CM

## 2023-07-28 DIAGNOSIS — Z23 NEED FOR VACCINATION: ICD-10-CM

## 2023-07-28 DIAGNOSIS — G47.00 INSOMNIA, UNSPECIFIED TYPE: ICD-10-CM

## 2023-07-28 DIAGNOSIS — E78.5 DYSLIPIDEMIA: ICD-10-CM

## 2023-07-28 DIAGNOSIS — E03.9 ACQUIRED HYPOTHYROIDISM: ICD-10-CM

## 2023-07-28 PROCEDURE — 3079F DIAST BP 80-89 MM HG: CPT | Performed by: FAMILY MEDICINE

## 2023-07-28 PROCEDURE — 99396 PREV VISIT EST AGE 40-64: CPT | Performed by: FAMILY MEDICINE

## 2023-07-28 PROCEDURE — 3075F SYST BP GE 130 - 139MM HG: CPT | Performed by: FAMILY MEDICINE

## 2023-07-28 PROCEDURE — 3008F BODY MASS INDEX DOCD: CPT | Performed by: FAMILY MEDICINE

## 2023-07-28 RX ORDER — MOMETASONE FUROATE AND FORMOTEROL FUMARATE DIHYDRATE 200; 5 UG/1; UG/1
1 AEROSOL RESPIRATORY (INHALATION) 2 TIMES DAILY
Qty: 1 EACH | Refills: 11 | Status: SHIPPED | OUTPATIENT
Start: 2023-07-28 | End: 2023-07-28

## 2023-07-28 RX ORDER — ZOLPIDEM TARTRATE 5 MG/1
5 TABLET ORAL NIGHTLY PRN
Qty: 30 TABLET | Refills: 0 | Status: SHIPPED | OUTPATIENT
Start: 2023-07-28

## 2023-07-28 RX ORDER — LEVOTHYROXINE SODIUM 0.1 MG/1
100 TABLET ORAL DAILY
Qty: 90 TABLET | Refills: 0 | Status: SHIPPED | OUTPATIENT
Start: 2023-07-28 | End: 2024-07-22

## 2023-07-28 RX ORDER — FLUTICASONE PROPIONATE AND SALMETEROL XINAFOATE 230; 21 UG/1; UG/1
1 AEROSOL, METERED RESPIRATORY (INHALATION) 2 TIMES DAILY
Qty: 1 EACH | Refills: 3 | Status: SHIPPED | OUTPATIENT
Start: 2023-07-28

## 2023-07-28 RX ORDER — ATORVASTATIN CALCIUM 40 MG/1
40 TABLET, FILM COATED ORAL NIGHTLY
Qty: 90 TABLET | Refills: 3 | Status: SHIPPED | OUTPATIENT
Start: 2023-07-28

## 2023-07-29 NOTE — TELEPHONE ENCOUNTER
Per Dr. Tammie Posey it should be an event monitor not a 30 day continuous monitor. I called Salima Willis and CHERYLE stating info. Advised to call back with questions.  tanesha
QUESTION ABOUT ORDERS FOR CARDIAC TELEMETRY   SHOULD IT BE- CARD MONITOR 30 DAY EVENT?    PLEASE CLARIFY
Opt out

## 2023-07-31 ENCOUNTER — TELEPHONE (OUTPATIENT)
Dept: FAMILY MEDICINE CLINIC | Facility: CLINIC | Age: 63
End: 2023-07-31

## 2023-07-31 ENCOUNTER — OFFICE VISIT (OUTPATIENT)
Dept: FAMILY MEDICINE CLINIC | Facility: CLINIC | Age: 63
End: 2023-07-31
Payer: COMMERCIAL

## 2023-07-31 VITALS
DIASTOLIC BLOOD PRESSURE: 84 MMHG | TEMPERATURE: 98 F | SYSTOLIC BLOOD PRESSURE: 138 MMHG | HEIGHT: 68 IN | OXYGEN SATURATION: 97 % | HEART RATE: 70 BPM | BODY MASS INDEX: 37.44 KG/M2 | WEIGHT: 247 LBS

## 2023-07-31 DIAGNOSIS — S81.811D: Primary | ICD-10-CM

## 2023-07-31 PROCEDURE — 3008F BODY MASS INDEX DOCD: CPT | Performed by: FAMILY MEDICINE

## 2023-07-31 PROCEDURE — 99213 OFFICE O/P EST LOW 20 MIN: CPT | Performed by: FAMILY MEDICINE

## 2023-07-31 PROCEDURE — 3079F DIAST BP 80-89 MM HG: CPT | Performed by: FAMILY MEDICINE

## 2023-07-31 PROCEDURE — 3075F SYST BP GE 130 - 139MM HG: CPT | Performed by: FAMILY MEDICINE

## 2023-07-31 NOTE — TELEPHONE ENCOUNTER
Pt was seen on Friday & showed dr a cut on her leg. Her leg is now looking infected. It is red & swollen.

## 2023-07-31 NOTE — TELEPHONE ENCOUNTER
PC to pt. Spoke with pt. Pt states she saw Dr. Ena Presley on 7/28 and had mentioned a puncture wound she got before going [place]. Puncture is located on right lower leg. Pt states she probably should have had stitches but didn't want to have them because she was going on vacation. Pt did have it covered with a bandage for a while. Pt states she was told by Dr. Ena Presley that it looks ok enough to uncover and keep it clean. Pt states since then she has noticed the area has been swelling and is moving up the right leg. Pt denies the area being warm to touch but is tender to the touch. Pt denies any discharge at site. Pt states the wound is scabbed over. Pt wishes to be seen and not wait to see what happens. Asked pt if she has MyChart and if she can send a picture of her leg then this MA would talk a covering provider then call her back. Awaiting picture from pt.

## 2023-07-31 NOTE — TELEPHONE ENCOUNTER
Pt made an appt. Future Appointments   Date Time Provider Jania Montoya   7/31/2023  3:00 PM Aliya Hernandez MD [facility]     Triage complete. Closing note.

## 2023-08-01 DIAGNOSIS — I10 ESSENTIAL (PRIMARY) HYPERTENSION: ICD-10-CM

## 2023-08-01 RX ORDER — LEVOTHYROXINE SODIUM 0.12 MG/1
TABLET ORAL
Qty: 90 TABLET | Refills: 0 | OUTPATIENT
Start: 2023-08-01

## 2023-08-01 RX ORDER — CARVEDILOL 12.5 MG/1
12.5 TABLET ORAL 2 TIMES DAILY WITH MEALS
Qty: 180 TABLET | Refills: 0 | Status: SHIPPED | OUTPATIENT
Start: 2023-08-01

## 2023-08-01 NOTE — TELEPHONE ENCOUNTER
Impression: Dry eye syndrome of bilateral lacrimal glands: H04.123. Plan: Recommend artificial tears multiple times daily in both eyes. Patient can also use a gel drop/ointment at night. Observe. Thyroid Supplements Protocol [id number] 07:31 AM   Protocol Details TSH value between 0.350 and 5.500 IU/ml    TSH test in past 12 months    Appointment in past 12 or next 3 months          Hypertension Medications Protocol [id number] 07:31 AM   Protocol Details CMP or BMP in past 12 months    Last serum creatinine< 2.0    Appointment in past 6 or next 3 months      Last office visit:  23  Last cmp:  07/10/23  Last tsh:  07/10/23  Levothyroxine 125mc/  Carvedilol:  23  #180, no refills      No future appointments. Normal

## 2023-09-28 ENCOUNTER — PATIENT OUTREACH (OUTPATIENT)
Dept: FAMILY MEDICINE CLINIC | Facility: CLINIC | Age: 63
End: 2023-09-28

## 2023-10-26 DIAGNOSIS — E03.9 ACQUIRED HYPOTHYROIDISM: ICD-10-CM

## 2023-10-26 RX ORDER — LEVOTHYROXINE SODIUM 0.1 MG/1
100 TABLET ORAL DAILY
Qty: 90 TABLET | Refills: 0 | Status: SHIPPED | OUTPATIENT
Start: 2023-10-26

## 2023-10-26 NOTE — TELEPHONE ENCOUNTER
OV 07/31  REFILL 07/28 #90  LABS 07/10    Requested Prescriptions     Pending Prescriptions Disp Refills    LEVOTHYROXINE 100 MCG Oral Tab [Pharmacy Med Name: LEVOTHYROXINE 100 MCG TABLET] 90 tablet 0     Sig: TAKE 1 TABLET BY MOUTH EVERY DAY     No future appointments.

## 2023-10-30 DIAGNOSIS — I10 ESSENTIAL (PRIMARY) HYPERTENSION: ICD-10-CM

## 2023-10-30 RX ORDER — CARVEDILOL 12.5 MG/1
12.5 TABLET ORAL 2 TIMES DAILY WITH MEALS
Qty: 180 TABLET | Refills: 0 | Status: SHIPPED | OUTPATIENT
Start: 2023-10-30

## 2023-10-30 NOTE — TELEPHONE ENCOUNTER
Requested Prescriptions     Pending Prescriptions Disp Refills    CARVEDILOL 12.5 MG Oral Tab [Pharmacy Med Name: CARVEDILOL 12.5 MG TABLET] 180 tablet 0     Sig: TAKE 1 TABLET BY MOUTH IN THE MORNING AND IN THE EVENING WITH MEALS     Last refill 8/1/23 #180  LOV 7/28/23  No future appointments.   Last labs 7/10/23  Refilled per protocol

## 2023-11-21 DIAGNOSIS — R07.81 RIB PAIN ON RIGHT SIDE: ICD-10-CM

## 2023-11-21 DIAGNOSIS — G47.00 INSOMNIA, UNSPECIFIED TYPE: ICD-10-CM

## 2023-11-21 DIAGNOSIS — I10 ESSENTIAL HYPERTENSION: ICD-10-CM

## 2023-11-21 NOTE — TELEPHONE ENCOUNTER
PT NEEDS REFILL ON zolpidem 5 MG Oral Tab, cyclobenzaprine 10 MG Oral Tab AND torsemide 20 MG Oral Tab. [facility] [place].

## 2023-11-21 NOTE — TELEPHONE ENCOUNTER
Last refill   Zolpidem 7/28/23 #30 0ref  Cyclobenzaprine 5/12/23 #30 1 ref  Torsemide 3/8/23 #90 1 ref  LOV 7/28/23  No future appointments.

## 2023-11-22 RX ORDER — CYCLOBENZAPRINE HCL 10 MG
TABLET ORAL
Qty: 30 TABLET | Refills: 1 | Status: SHIPPED | OUTPATIENT
Start: 2023-11-22

## 2023-11-22 RX ORDER — ZOLPIDEM TARTRATE 5 MG/1
5 TABLET ORAL NIGHTLY PRN
Qty: 30 TABLET | Refills: 0 | Status: SHIPPED | OUTPATIENT
Start: 2023-11-22

## 2023-11-22 RX ORDER — TORSEMIDE 20 MG/1
20 TABLET ORAL DAILY
Qty: 90 TABLET | Refills: 1 | Status: SHIPPED | OUTPATIENT
Start: 2023-11-22

## 2023-11-22 NOTE — TELEPHONE ENCOUNTER
Hypertension Medications Protocol [id number] 03:33 PM   Protocol Details CMP or BMP in past 12 months    Last serum creatinine< 2.0    Appointment in past 6 or next 3 months

## 2023-12-05 ENCOUNTER — OFFICE VISIT (OUTPATIENT)
Dept: FAMILY MEDICINE CLINIC | Facility: CLINIC | Age: 63
End: 2023-12-05
Payer: COMMERCIAL

## 2023-12-05 VITALS
TEMPERATURE: 97 F | HEART RATE: 72 BPM | OXYGEN SATURATION: 99 % | DIASTOLIC BLOOD PRESSURE: 84 MMHG | SYSTOLIC BLOOD PRESSURE: 138 MMHG

## 2023-12-05 DIAGNOSIS — M54.41 ACUTE MIDLINE LOW BACK PAIN WITH RIGHT-SIDED SCIATICA: Primary | ICD-10-CM

## 2023-12-05 DIAGNOSIS — I10 ESSENTIAL HYPERTENSION: ICD-10-CM

## 2023-12-05 DIAGNOSIS — J30.1 SEASONAL ALLERGIC RHINITIS DUE TO POLLEN: ICD-10-CM

## 2023-12-05 DIAGNOSIS — Z12.11 COLON CANCER SCREENING: ICD-10-CM

## 2023-12-05 PROCEDURE — 99214 OFFICE O/P EST MOD 30 MIN: CPT | Performed by: FAMILY MEDICINE

## 2023-12-05 PROCEDURE — 3079F DIAST BP 80-89 MM HG: CPT | Performed by: FAMILY MEDICINE

## 2023-12-05 PROCEDURE — 3075F SYST BP GE 130 - 139MM HG: CPT | Performed by: FAMILY MEDICINE

## 2023-12-05 RX ORDER — METHOCARBAMOL 750 MG/1
750 TABLET, FILM COATED ORAL 4 TIMES DAILY
Qty: 56 TABLET | Refills: 0 | Status: SHIPPED | OUTPATIENT
Start: 2023-12-05 | End: 2023-12-19

## 2023-12-05 RX ORDER — MONTELUKAST SODIUM 10 MG/1
10 TABLET ORAL DAILY
Qty: 90 TABLET | Refills: 3 | Status: SHIPPED | OUTPATIENT
Start: 2023-12-05 | End: 2024-11-29

## 2023-12-05 RX ORDER — METHYLPREDNISOLONE 4 MG/1
TABLET ORAL
Qty: 1 EACH | Refills: 0 | Status: SHIPPED | OUTPATIENT
Start: 2023-12-05

## 2023-12-11 ENCOUNTER — HOSPITAL ENCOUNTER (OUTPATIENT)
Dept: GENERAL RADIOLOGY | Age: 63
Discharge: HOME | End: 2023-12-11
Attending: FAMILY MEDICINE
Payer: COMMERCIAL

## 2023-12-11 ENCOUNTER — HOSPITAL ENCOUNTER (OUTPATIENT)
Dept: GENERAL RADIOLOGY | Age: 63
Discharge: HOME | End: 2023-12-11
Payer: COMMERCIAL

## 2023-12-11 ENCOUNTER — TELEPHONE (OUTPATIENT)
Dept: FAMILY MEDICINE CLINIC | Facility: CLINIC | Age: 63
End: 2023-12-11

## 2023-12-11 DIAGNOSIS — M54.6 THORACIC BACK PAIN, UNSPECIFIED BACK PAIN LATERALITY, UNSPECIFIED CHRONICITY: ICD-10-CM

## 2023-12-11 DIAGNOSIS — M54.6 THORACIC BACK PAIN, UNSPECIFIED BACK PAIN LATERALITY, UNSPECIFIED CHRONICITY: Primary | ICD-10-CM

## 2023-12-11 DIAGNOSIS — M54.41 ACUTE MIDLINE LOW BACK PAIN WITH RIGHT-SIDED SCIATICA: ICD-10-CM

## 2023-12-11 DIAGNOSIS — M54.41 ACUTE MIDLINE LOW BACK PAIN WITH RIGHT-SIDED SCIATICA: Primary | ICD-10-CM

## 2023-12-11 PROCEDURE — 72072 X-RAY EXAM THORAC SPINE 3VWS: CPT | Performed by: FAMILY MEDICINE

## 2023-12-11 PROCEDURE — 72110 X-RAY EXAM L-2 SPINE 4/>VWS: CPT

## 2023-12-11 RX ORDER — HYDROCODONE BITARTRATE AND ACETAMINOPHEN 10; 325 MG/1; MG/1
1 TABLET ORAL EVERY 6 HOURS PRN
Qty: 16 TABLET | Refills: 0 | Status: SHIPPED | OUTPATIENT
Start: 2023-12-11 | End: 2023-12-12 | Stop reason: RX

## 2023-12-11 NOTE — TELEPHONE ENCOUNTER
Thoracic xray ordered. Patient will call to schedule in our office today. Patient has not tried diclofenac, has been using Blue ice. Patient will try Hydrocodone to see if it helps if Dr Lory Mendoza will prescribe it.

## 2023-12-11 NOTE — TELEPHONE ENCOUNTER
Called patient, she states she was seen 12/5/23 for back pain. Patient states that since starting the Medrol dose pack she has had no relief. Patient is still using heat and ice, but states pain gets up to a 10 out of 10 by the end of the day and she does not know what to do. The first time she took the methocarbamol it upset her stomach, so she has only take 1/2 tab at a time. Patient requested message be sent to Dr. Haley Xavier to see what else she can do? She asked even if she can't be seen tomorrow if Dr. Haley Xavier thought an xray would help to find out what could be causing this much pain. Advised urgent care if symptoms worsen. Patient aware Dr. Haley Xavier is out of office today. Ok to wait for her return tomorrow.

## 2023-12-12 ENCOUNTER — TELEPHONE (OUTPATIENT)
Dept: FAMILY MEDICINE CLINIC | Facility: CLINIC | Age: 63
End: 2023-12-12

## 2023-12-12 DIAGNOSIS — M54.6 THORACIC BACK PAIN, UNSPECIFIED BACK PAIN LATERALITY, UNSPECIFIED CHRONICITY: Primary | ICD-10-CM

## 2023-12-12 RX ORDER — HYDROCODONE BITARTRATE AND ACETAMINOPHEN 5; 325 MG/1; MG/1
2 TABLET ORAL EVERY 6 HOURS PRN
Qty: 32 TABLET | Refills: 0 | Status: CANCELLED | OUTPATIENT
Start: 2023-12-12

## 2023-12-12 RX ORDER — HYDROCODONE BITARTRATE AND ACETAMINOPHEN 10; 300 MG/1; MG/1
1 TABLET ORAL EVERY 6 HOURS
Qty: 16 TABLET | Refills: 0 | Status: SHIPPED | OUTPATIENT
Start: 2023-12-12

## 2023-12-12 NOTE — TELEPHONE ENCOUNTER
Spoke with pharm, no Norco 10/325 available, do have Vicodin HP 10/300 in stock. Order pended and routed to DS to sign. PC to pt and notified of new medication and directions. Pt notes she can't remember if it was hydrocodone she had an issue with in the past with nausea. Willing to try this medication. Instructed pt to eat something before taking and to notify if any problems.

## 2024-01-23 DIAGNOSIS — E03.9 ACQUIRED HYPOTHYROIDISM: ICD-10-CM

## 2024-01-23 RX ORDER — LEVOTHYROXINE SODIUM 0.1 MG/1
100 TABLET ORAL DAILY
Qty: 90 TABLET | Refills: 0 | Status: SHIPPED | OUTPATIENT
Start: 2024-01-23

## 2024-01-27 DIAGNOSIS — I10 ESSENTIAL (PRIMARY) HYPERTENSION: ICD-10-CM

## 2024-01-27 DIAGNOSIS — E78.5 DYSLIPIDEMIA: Primary | ICD-10-CM

## 2024-01-27 RX ORDER — CARVEDILOL 12.5 MG/1
12.5 TABLET ORAL 2 TIMES DAILY WITH MEALS
Qty: 180 TABLET | Refills: 0 | Status: SHIPPED | OUTPATIENT
Start: 2024-01-27

## 2024-01-27 NOTE — TELEPHONE ENCOUNTER
OV 12/2023  LABS 07/10/23    REFILL 10/30 #180    No future appointments. PT IS DUE FOR 6MO LIPIDS RECHECK

## 2024-02-23 ENCOUNTER — TELEPHONE (OUTPATIENT)
Dept: FAMILY MEDICINE CLINIC | Facility: CLINIC | Age: 64
End: 2024-02-23

## 2024-02-23 NOTE — TELEPHONE ENCOUNTER
Pt has pink eye & wanted to know if dr can call something in.  Pharmacy:  [facility]

## 2024-02-23 NOTE — TELEPHONE ENCOUNTER
Spoke with Margarita - medication/abx not prescribed over the phone without being seen. It could be viral or bacterial, walk-in or UC recommended for further assessment. V/u    -spoke with BOBBY Payne before calling pt.        no

## 2024-03-25 ENCOUNTER — TELEPHONE (OUTPATIENT)
Dept: FAMILY MEDICINE CLINIC | Facility: CLINIC | Age: 64
End: 2024-03-25

## 2024-03-25 DIAGNOSIS — I10 ESSENTIAL HYPERTENSION: ICD-10-CM

## 2024-03-25 DIAGNOSIS — Z13.0 SCREENING, IRON DEFICIENCY ANEMIA: ICD-10-CM

## 2024-03-25 DIAGNOSIS — E78.5 DYSLIPIDEMIA: ICD-10-CM

## 2024-03-25 DIAGNOSIS — Z01.84 IMMUNITY STATUS TESTING: Primary | ICD-10-CM

## 2024-03-25 DIAGNOSIS — E03.9 ACQUIRED HYPOTHYROIDISM: ICD-10-CM

## 2024-03-25 DIAGNOSIS — Z13.1 SCREENING FOR DIABETES MELLITUS (DM): ICD-10-CM

## 2024-03-25 DIAGNOSIS — Z13.29 SCREENING FOR THYROID DISORDER: ICD-10-CM

## 2024-03-25 NOTE — TELEPHONE ENCOUNTER
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

## 2024-03-26 ENCOUNTER — HOSPITAL ENCOUNTER (OUTPATIENT)
Dept: MAMMOGRAPHY | Age: 64
Discharge: HOME | End: 2024-03-26
Attending: FAMILY MEDICINE
Payer: MEDICARE

## 2024-03-26 DIAGNOSIS — Z00.00 ROUTINE ADULT HEALTH MAINTENANCE: ICD-10-CM

## 2024-03-26 PROCEDURE — 77063 BREAST TOMOSYNTHESIS BI: CPT | Performed by: FAMILY MEDICINE

## 2024-03-26 PROCEDURE — 77067 SCR MAMMO BI INCL CAD: CPT | Performed by: FAMILY MEDICINE

## 2024-03-26 NOTE — TELEPHONE ENCOUNTER
Have Margarita blackburn a lab appointment for a measles titer. If she is immune she should be ok. Should still wear mask etc.

## 2024-03-26 NOTE — TELEPHONE ENCOUNTER
Patient notified of provider comments and recommendations.  Appointment scheduled for blood work:  Future Appointments   Date Time Provider Department Center   3/26/2024 10:40 AM [facility] [place]   3/26/2024  1:00 PM REF [facility] REF [facility] Ref Lab [facility]     Patient states will be making an appointment for a physical and wondering if could do her routine labs when she comes in today. She is aware has not been a year from the last set of labs.    Lipid order has already been placed. Please review orders.

## 2024-03-27 ENCOUNTER — LABORATORY ENCOUNTER (OUTPATIENT)
Dept: LAB | Age: 64
End: 2024-03-27
Attending: FAMILY MEDICINE
Payer: MEDICARE

## 2024-03-27 DIAGNOSIS — Z13.29 SCREENING FOR THYROID DISORDER: ICD-10-CM

## 2024-03-27 DIAGNOSIS — Z01.84 IMMUNITY STATUS TESTING: ICD-10-CM

## 2024-03-27 DIAGNOSIS — E03.9 ACQUIRED HYPOTHYROIDISM: ICD-10-CM

## 2024-03-27 DIAGNOSIS — Z13.0 SCREENING, IRON DEFICIENCY ANEMIA: ICD-10-CM

## 2024-03-27 DIAGNOSIS — E78.5 DYSLIPIDEMIA: ICD-10-CM

## 2024-03-27 DIAGNOSIS — Z13.1 SCREENING FOR DIABETES MELLITUS (DM): ICD-10-CM

## 2024-03-27 LAB
ALBUMIN SERPL-MCNC: 3.6 G/DL (ref 3.4–5)
ALBUMIN/GLOB SERPL: 0.9 {RATIO} (ref 1–2)
ALP LIVER SERPL-CCNC: 121 U/L
ALT SERPL-CCNC: 25 U/L
ANION GAP SERPL CALC-SCNC: 5 MMOL/L (ref 0–18)
AST SERPL-CCNC: 21 U/L (ref 15–37)
BASOPHILS # BLD AUTO: 0.06 X10(3) UL (ref 0–0.2)
BASOPHILS NFR BLD AUTO: 1.1 %
BILIRUB SERPL-MCNC: 0.5 MG/DL (ref 0.1–2)
BUN BLD-MCNC: 15 MG/DL (ref 9–23)
CALCIUM BLD-MCNC: 9.4 MG/DL (ref 8.5–10.1)
CHLORIDE SERPL-SCNC: 108 MMOL/L (ref 98–112)
CHOLEST SERPL-MCNC: 193 MG/DL (ref ?–200)
CO2 SERPL-SCNC: 29 MMOL/L (ref 21–32)
CREAT BLD-MCNC: 1.01 MG/DL
EGFRCR SERPLBLD CKD-EPI 2021: 62 ML/MIN/1.73M2 (ref 60–?)
EOSINOPHIL # BLD AUTO: 0.13 X10(3) UL (ref 0–0.7)
EOSINOPHIL NFR BLD AUTO: 2.3 %
ERYTHROCYTE [DISTWIDTH] IN BLOOD BY AUTOMATED COUNT: 13.2 %
EST. AVERAGE GLUCOSE BLD GHB EST-MCNC: 126 MG/DL (ref 68–126)
FASTING PATIENT LIPID ANSWER: YES
FASTING STATUS PATIENT QL REPORTED: YES
GLOBULIN PLAS-MCNC: 4 G/DL (ref 2.8–4.4)
GLUCOSE BLD-MCNC: 99 MG/DL (ref 70–99)
HBA1C MFR BLD: 6 % (ref ?–5.7)
HCT VFR BLD AUTO: 41.8 %
HDLC SERPL-MCNC: 39 MG/DL (ref 40–59)
HGB BLD-MCNC: 13.8 G/DL
IMM GRANULOCYTES # BLD AUTO: 0.01 X10(3) UL (ref 0–1)
IMM GRANULOCYTES NFR BLD: 0.2 %
LDLC SERPL CALC-MCNC: 125 MG/DL (ref ?–100)
LYMPHOCYTES # BLD AUTO: 1.68 X10(3) UL (ref 1–4)
LYMPHOCYTES NFR BLD AUTO: 30.3 %
MCH RBC QN AUTO: 27.8 PG (ref 26–34)
MCHC RBC AUTO-ENTMCNC: 33 G/DL (ref 31–37)
MCV RBC AUTO: 84.3 FL
MONOCYTES # BLD AUTO: 0.63 X10(3) UL (ref 0.1–1)
MONOCYTES NFR BLD AUTO: 11.4 %
NEUTROPHILS # BLD AUTO: 3.04 X10 (3) UL (ref 1.5–7.7)
NEUTROPHILS # BLD AUTO: 3.04 X10(3) UL (ref 1.5–7.7)
NEUTROPHILS NFR BLD AUTO: 54.7 %
NONHDLC SERPL-MCNC: 154 MG/DL (ref ?–130)
OSMOLALITY SERPL CALC.SUM OF ELEC: 295 MOSM/KG (ref 275–295)
PLATELET # BLD AUTO: 211 10(3)UL (ref 150–450)
POTASSIUM SERPL-SCNC: 4.3 MMOL/L (ref 3.5–5.1)
PROT SERPL-MCNC: 7.6 G/DL (ref 6.4–8.2)
RBC # BLD AUTO: 4.96 X10(6)UL
SODIUM SERPL-SCNC: 142 MMOL/L (ref 136–145)
T3FREE SERPL-MCNC: 1.94 PG/ML (ref 2.4–4.2)
T4 FREE SERPL-MCNC: 1 NG/DL (ref 0.8–1.7)
TRIGL SERPL-MCNC: 162 MG/DL (ref 30–149)
TSI SER-ACNC: 6.56 MIU/ML (ref 0.36–3.74)
VLDLC SERPL CALC-MCNC: 29 MG/DL (ref 0–30)
WBC # BLD AUTO: 5.6 X10(3) UL (ref 4–11)

## 2024-03-27 PROCEDURE — 83036 HEMOGLOBIN GLYCOSYLATED A1C: CPT

## 2024-03-27 PROCEDURE — 85025 COMPLETE CBC W/AUTO DIFF WBC: CPT

## 2024-03-27 PROCEDURE — 36415 COLL VENOUS BLD VENIPUNCTURE: CPT

## 2024-03-27 PROCEDURE — 84443 ASSAY THYROID STIM HORMONE: CPT

## 2024-03-27 PROCEDURE — 86765 RUBEOLA ANTIBODY: CPT

## 2024-03-27 PROCEDURE — 80053 COMPREHEN METABOLIC PANEL: CPT

## 2024-03-27 PROCEDURE — 80061 LIPID PANEL: CPT

## 2024-03-27 PROCEDURE — 84439 ASSAY OF FREE THYROXINE: CPT

## 2024-03-27 PROCEDURE — 84481 FREE ASSAY (FT-3): CPT

## 2024-03-29 LAB — MEV IGG SER-ACNC: >300 AU/ML (ref 16.5–?)

## 2024-04-02 DIAGNOSIS — E78.00 HYPERCHOLESTEREMIA: Primary | ICD-10-CM

## 2024-04-02 DIAGNOSIS — J30.1 SEASONAL ALLERGIC RHINITIS DUE TO POLLEN: ICD-10-CM

## 2024-04-02 DIAGNOSIS — J45.41 MODERATE PERSISTENT ASTHMA WITH EXACERBATION (HCC): ICD-10-CM

## 2024-04-02 DIAGNOSIS — E03.9 ACQUIRED HYPOTHYROIDISM: Primary | ICD-10-CM

## 2024-04-02 DIAGNOSIS — G47.00 INSOMNIA, UNSPECIFIED TYPE: ICD-10-CM

## 2024-04-02 DIAGNOSIS — E78.5 DYSLIPIDEMIA: Primary | ICD-10-CM

## 2024-04-02 DIAGNOSIS — J20.9 ACUTE BRONCHITIS WITH BRONCHOSPASM: ICD-10-CM

## 2024-04-02 RX ORDER — ATORVASTATIN CALCIUM 40 MG/1
40 TABLET, FILM COATED ORAL NIGHTLY
Qty: 90 TABLET | Refills: 3 | Status: CANCELLED | OUTPATIENT
Start: 2024-04-02

## 2024-04-02 RX ORDER — MONTELUKAST SODIUM 10 MG/1
10 TABLET ORAL DAILY
Qty: 90 TABLET | Refills: 3 | Status: CANCELLED | OUTPATIENT
Start: 2024-04-02 | End: 2025-03-28

## 2024-04-02 RX ORDER — LEVOTHYROXINE SODIUM 0.12 MG/1
125 TABLET ORAL
Qty: 90 TABLET | Refills: 0 | Status: SHIPPED | OUTPATIENT
Start: 2024-04-02

## 2024-04-02 RX ORDER — EZETIMIBE 10 MG/1
10 TABLET ORAL DAILY
Qty: 90 TABLET | Refills: 3 | Status: SHIPPED | OUTPATIENT
Start: 2024-04-02 | End: 2025-03-28

## 2024-04-03 RX ORDER — ZOLPIDEM TARTRATE 5 MG/1
5 TABLET ORAL NIGHTLY PRN
Qty: 30 TABLET | Refills: 0 | Status: SHIPPED | OUTPATIENT
Start: 2024-04-03

## 2024-04-03 RX ORDER — FLUTICASONE PROPIONATE 220 UG/1
2 AEROSOL, METERED RESPIRATORY (INHALATION) 2 TIMES DAILY
Qty: 3 EACH | Refills: 2 | Status: SHIPPED | OUTPATIENT
Start: 2024-04-03

## 2024-04-03 RX ORDER — FLUTICASONE PROPIONATE AND SALMETEROL XINAFOATE 230; 21 UG/1; UG/1
1 AEROSOL, METERED RESPIRATORY (INHALATION) 2 TIMES DAILY
Qty: 1 EACH | Refills: 3 | Status: SHIPPED | OUTPATIENT
Start: 2024-04-03

## 2024-04-03 NOTE — TELEPHONE ENCOUNTER
Zolpidem   Last refilled 11/22/23 #30/0 refills     Fluticasone-salmeterol  Last refilled 7/28/23 #1each/3 refills     Fluticasone propionate   Last refilled 4/15/22 # 3 each/2 refills    Last OV 12/5/23      [facility] that [facility] does have refills remaining on her Montelukast which they will get ready for her today, and refills remaining on Atorvastatin, which is to early to refill as last  was 2/23/24 for #90 tabs.

## 2024-04-03 NOTE — TELEPHONE ENCOUNTER
Zolpidem   Last refilled 11/22/23 #30/0 refills    Fluticasone-salmeterol  Last refilled 7/28/23 #1each/3 refills    Fluticasone propionate   Last refilled 4/15/22 # 3 each/2 refills      Will call pharmacy when they open to confirm if refills remain on medications listed below:     Atorvastatin  Last refilled 7/28/23 #90/3 refills    Montelukast  Last refilled 12/5/23 #90/3

## 2024-04-18 ENCOUNTER — OFFICE VISIT (OUTPATIENT)
Dept: FAMILY MEDICINE CLINIC | Facility: CLINIC | Age: 64
End: 2024-04-18
Payer: MEDICARE

## 2024-04-18 VITALS
OXYGEN SATURATION: 97 % | HEART RATE: 63 BPM | HEIGHT: 68 IN | TEMPERATURE: 99 F | DIASTOLIC BLOOD PRESSURE: 100 MMHG | SYSTOLIC BLOOD PRESSURE: 150 MMHG | BODY MASS INDEX: 38 KG/M2

## 2024-04-18 DIAGNOSIS — J02.9 SORE THROAT: ICD-10-CM

## 2024-04-18 DIAGNOSIS — J34.89 SINUS PRESSURE: ICD-10-CM

## 2024-04-18 DIAGNOSIS — H93.8X1 EAR PRESSURE, RIGHT: Primary | ICD-10-CM

## 2024-04-18 PROBLEM — E78.5 HYPERLIPIDEMIA: Status: ACTIVE | Noted: 2022-06-09

## 2024-04-18 PROBLEM — J45.909 ASTHMA (HCC): Status: ACTIVE | Noted: 2022-06-09

## 2024-04-18 LAB
CONTROL LINE PRESENT WITH A CLEAR BACKGROUND (YES/NO): YES YES/NO
KIT LOT #: NORMAL NUMERIC
STREP GRP A CUL-SCR: NEGATIVE

## 2024-04-18 PROCEDURE — 87081 CULTURE SCREEN ONLY: CPT

## 2024-04-18 PROCEDURE — 99214 OFFICE O/P EST MOD 30 MIN: CPT

## 2024-04-18 PROCEDURE — 87880 STREP A ASSAY W/OPTIC: CPT

## 2024-04-18 RX ORDER — METHYLPREDNISOLONE 4 MG/1
TABLET ORAL
Qty: 1 EACH | Refills: 0 | Status: SHIPPED | OUTPATIENT
Start: 2024-04-18
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
## 2024-04-25 ENCOUNTER — TELEPHONE (OUTPATIENT)
Dept: FAMILY MEDICINE CLINIC | Facility: CLINIC | Age: 64
End: 2024-04-25

## 2024-04-25 RX ORDER — AZITHROMYCIN 250 MG/1
TABLET, FILM COATED ORAL
Qty: 6 TABLET | Refills: 0 | Status: SHIPPED | OUTPATIENT
Start: 2024-04-25 | End: 2024-04-29

## 2024-04-25 NOTE — TELEPHONE ENCOUNTER
Patient said that she saw Chelsea on 4/18/24 and was prescribed Prednisone. She said she has one day left. She said that her ear is very painful and her throat is \"raw\" from drainage. She said that Chelsea told her if the ear did not get better to call back and she would prescribe an antibiotic. She has multiple allergies but said Zithromax usually helps. [facility] [place].
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
## 2024-05-10 ENCOUNTER — OFFICE VISIT (OUTPATIENT)
Dept: FAMILY MEDICINE CLINIC | Facility: CLINIC | Age: 64
End: 2024-05-10
Payer: MEDICARE

## 2024-05-10 VITALS
TEMPERATURE: 98 F | SYSTOLIC BLOOD PRESSURE: 130 MMHG | OXYGEN SATURATION: 99 % | DIASTOLIC BLOOD PRESSURE: 84 MMHG | HEART RATE: 69 BPM

## 2024-05-10 DIAGNOSIS — M54.16 LUMBAR BACK PAIN WITH RADICULOPATHY AFFECTING LEFT LOWER EXTREMITY: Primary | ICD-10-CM

## 2024-05-10 DIAGNOSIS — Z12.11 COLON CANCER SCREENING: ICD-10-CM

## 2024-05-10 DIAGNOSIS — Z78.0 POSTMENOPAUSAL ESTROGEN DEFICIENCY: ICD-10-CM

## 2024-05-10 LAB
APPEARANCE: CLEAR
BILIRUBIN: NEGATIVE
GLUCOSE (URINE DIPSTICK): NEGATIVE MG/DL
MULTISTIX LOT#: ABNORMAL NUMERIC
NITRITE, URINE: NEGATIVE
OCCULT BLOOD: NEGATIVE
PH, URINE: 5 (ref 4.5–8)
PROTEIN (URINE DIPSTICK): NEGATIVE MG/DL
SPECIFIC GRAVITY: 1.03 (ref 1–1.03)
URINE-COLOR: YELLOW
UROBILINOGEN,SEMI-QN: 0.2 MG/DL (ref 0–1.9)

## 2024-05-10 PROCEDURE — 99214 OFFICE O/P EST MOD 30 MIN: CPT | Performed by: FAMILY MEDICINE

## 2024-05-10 PROCEDURE — 81003 URINALYSIS AUTO W/O SCOPE: CPT | Performed by: FAMILY MEDICINE

## 2024-05-10 RX ORDER — HYDROCODONE BITARTRATE AND ACETAMINOPHEN 10; 325 MG/1; MG/1
1 TABLET ORAL EVERY 6 HOURS PRN
Qty: 30 TABLET | Refills: 0 | Status: SHIPPED | OUTPATIENT
Start: 2024-05-10

## 2024-05-10 RX ORDER — METHYLPREDNISOLONE 4 MG/1
TABLET ORAL
Qty: 1 EACH | Refills: 0 | Status: SHIPPED | OUTPATIENT
Start: 2024-05-10

## 2024-05-29 ENCOUNTER — OFFICE VISIT (OUTPATIENT)
Dept: FAMILY MEDICINE CLINIC | Facility: CLINIC | Age: 64
End: 2024-05-29

## 2024-05-29 VITALS
TEMPERATURE: 98 F | OXYGEN SATURATION: 98 % | DIASTOLIC BLOOD PRESSURE: 84 MMHG | SYSTOLIC BLOOD PRESSURE: 134 MMHG | HEART RATE: 61 BPM

## 2024-05-29 DIAGNOSIS — M70.61 TROCHANTERIC BURSITIS OF RIGHT HIP: ICD-10-CM

## 2024-05-29 DIAGNOSIS — M54.16 LUMBAR BACK PAIN WITH RADICULOPATHY AFFECTING LEFT LOWER EXTREMITY: Primary | ICD-10-CM

## 2024-05-29 DIAGNOSIS — M76.31 ILIOTIBIAL BAND SYNDROME AFFECTING RIGHT LOWER LEG: ICD-10-CM

## 2024-05-29 PROCEDURE — 96372 THER/PROPH/DIAG INJ SC/IM: CPT | Performed by: FAMILY MEDICINE

## 2024-05-29 PROCEDURE — 99214 OFFICE O/P EST MOD 30 MIN: CPT | Performed by: FAMILY MEDICINE

## 2024-05-29 PROCEDURE — 20610 DRAIN/INJ JOINT/BURSA W/O US: CPT | Performed by: FAMILY MEDICINE

## 2024-05-29 RX ORDER — TRIAMCINOLONE ACETONIDE 40 MG/ML
40 INJECTION, SUSPENSION INTRA-ARTICULAR; INTRAMUSCULAR ONCE
Status: COMPLETED | OUTPATIENT
Start: 2024-05-29 | End: 2024-05-29

## 2024-05-29 RX ORDER — HYDROCODONE BITARTRATE AND ACETAMINOPHEN 10; 325 MG/1; MG/1
1 TABLET ORAL EVERY 6 HOURS PRN
Qty: 60 TABLET | Refills: 0 | Status: SHIPPED | OUTPATIENT
Start: 2024-05-29

## 2024-05-29 RX ADMIN — TRIAMCINOLONE ACETONIDE 40 MG: 40 INJECTION, SUSPENSION INTRA-ARTICULAR; INTRAMUSCULAR at 14:19:00
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
## 2024-05-29 NOTE — PROCEDURES
Joint aspiration/injection    Date/Time: 5/29/2024 3:30 PM    Performed by: ANABELLE Tamez DO  Authorized by: ANABELLE Tamez DO    Consent:     Consent obtained:  Written    Consent given by:  Patient    Risks, benefits, and alternatives were discussed: yes      Risks discussed:  Infection, pain and bleeding    Alternatives discussed:  Alternative treatment, referral and observation  Universal protocol:     Procedure explained and questions answered to patient or proxy's satisfaction: yes      Relevant documents present and verified: yes      Test results available: no      Imaging studies available: yes      Required blood products, implants, devices, and special equipment available: no      Site/side marked: yes      Immediately prior to procedure, a time out was called: yes      Patient identity confirmed:  Verbally with patient  Location:     Location:  Hip    Hip:  R hip joint  Anesthesia:     Anesthesia method:  None  Procedure details:     Preparation: Patient was prepped and draped in usual sterile fashion      Needle gauge:  20 G    Ultrasound guidance: no      Approach:  Lateral    Steroid injected: yes      Specimen collected: no    Post-procedure details:     Dressing:  Adhesive bandage    Procedure completion:  Tolerated  Comments:      Injected with kenalog 40 mg, lidocaine 1/2 ml and marcaine 1 1/2 ml  for total 3 ml

## 2024-07-10 DIAGNOSIS — E03.9 ACQUIRED HYPOTHYROIDISM: Primary | ICD-10-CM

## 2024-07-10 RX ORDER — LEVOTHYROXINE SODIUM 0.12 MG/1
125 TABLET ORAL
Qty: 90 TABLET | Refills: 0 | Status: SHIPPED | OUTPATIENT
Start: 2024-07-10

## 2024-07-10 NOTE — TELEPHONE ENCOUNTER
Requested Prescriptions     Pending Prescriptions Disp Refills    LEVOTHYROXINE 125 MCG Oral Tab [Pharmacy Med Name: LEVOTHYROXINE 125 MCG TABLET] 90 tablet 0     Sig: TAKE 1 TABLET BY MOUTH BEFORE BREAKFAST     Last refill 4/2/24 #90  LOV 5/29/24  No future appointments.  Patient is due for recheck on labs - reminder letter sent.  #30 pended for approval is appropriate

## 2024-07-31 ENCOUNTER — OFFICE VISIT (OUTPATIENT)
Dept: FAMILY MEDICINE CLINIC | Facility: CLINIC | Age: 64
End: 2024-07-31
Payer: MEDICARE

## 2024-07-31 DIAGNOSIS — M54.16 LUMBAR BACK PAIN WITH RADICULOPATHY AFFECTING RIGHT LOWER EXTREMITY: Primary | ICD-10-CM

## 2024-07-31 DIAGNOSIS — Z51.81 ENCOUNTER FOR THERAPEUTIC DRUG MONITORING: ICD-10-CM

## 2024-07-31 DIAGNOSIS — M31.0 LEUCOCYTOCLASTIC VASCULITIS (HCC): ICD-10-CM

## 2024-07-31 DIAGNOSIS — G90.521 REFLEX SYMPATHETIC DYSTROPHY OF RIGHT LOWER EXTREMITY: ICD-10-CM

## 2024-07-31 DIAGNOSIS — E03.9 ACQUIRED HYPOTHYROIDISM: ICD-10-CM

## 2024-07-31 DIAGNOSIS — M25.551 PAIN OF RIGHT HIP: ICD-10-CM

## 2024-07-31 DIAGNOSIS — M25.551 BILATERAL HIP PAIN: ICD-10-CM

## 2024-07-31 DIAGNOSIS — E78.2 MIXED HYPERLIPIDEMIA: ICD-10-CM

## 2024-07-31 DIAGNOSIS — R73.9 HYPERGLYCEMIA: ICD-10-CM

## 2024-07-31 DIAGNOSIS — M25.552 BILATERAL HIP PAIN: ICD-10-CM

## 2024-07-31 LAB
EST. AVERAGE GLUCOSE BLD GHB EST-MCNC: 123 MG/DL (ref 68–126)
HBA1C MFR BLD: 5.9 % (ref ?–5.7)

## 2024-07-31 PROCEDURE — 99214 OFFICE O/P EST MOD 30 MIN: CPT | Performed by: FAMILY MEDICINE

## 2024-07-31 PROCEDURE — 84443 ASSAY THYROID STIM HORMONE: CPT | Performed by: FAMILY MEDICINE

## 2024-07-31 PROCEDURE — 83036 HEMOGLOBIN GLYCOSYLATED A1C: CPT | Performed by: FAMILY MEDICINE

## 2024-07-31 PROCEDURE — 80053 COMPREHEN METABOLIC PANEL: CPT | Performed by: FAMILY MEDICINE

## 2024-07-31 PROCEDURE — 80061 LIPID PANEL: CPT | Performed by: FAMILY MEDICINE

## 2024-07-31 PROCEDURE — 84481 FREE ASSAY (FT-3): CPT | Performed by: FAMILY MEDICINE

## 2024-07-31 PROCEDURE — 84439 ASSAY OF FREE THYROXINE: CPT | Performed by: FAMILY MEDICINE

## 2024-07-31 RX ORDER — METHOCARBAMOL 750 MG/1
750 TABLET, FILM COATED ORAL 4 TIMES DAILY
Qty: 56 TABLET | Refills: 0 | Status: SHIPPED | OUTPATIENT
Start: 2024-07-31 | End: 2024-08-14

## 2024-07-31 RX ORDER — MELATONIN
50 DAILY
COMMUNITY

## 2024-07-31 RX ORDER — METHOCARBAMOL 750 MG/1
750 TABLET, FILM COATED ORAL 4 TIMES DAILY PRN
COMMUNITY

## 2024-07-31 RX ORDER — METHYLPREDNISOLONE 4 MG/1
TABLET ORAL
Qty: 1 EACH | Refills: 1 | Status: SHIPPED | OUTPATIENT
Start: 2024-07-31

## 2024-07-31 RX ORDER — HYDROCODONE BITARTRATE AND ACETAMINOPHEN 10; 325 MG/1; MG/1
1 TABLET ORAL EVERY 6 HOURS PRN
Qty: 60 TABLET | Refills: 0 | Status: SHIPPED | OUTPATIENT
Start: 2024-07-31
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
## 2024-08-01 ENCOUNTER — HOSPITAL ENCOUNTER (OUTPATIENT)
Dept: GENERAL RADIOLOGY | Age: 64
Discharge: HOME | End: 2024-08-01
Attending: FAMILY MEDICINE
Payer: MEDICARE

## 2024-08-01 DIAGNOSIS — M25.551 PAIN OF RIGHT HIP: ICD-10-CM

## 2024-08-01 LAB
ALBUMIN SERPL-MCNC: 5 G/DL (ref 3.2–4.8)
ALBUMIN/GLOB SERPL: 1.6 {RATIO} (ref 1–2)
ALP LIVER SERPL-CCNC: 132 U/L
ALT SERPL-CCNC: 25 U/L
ANION GAP SERPL CALC-SCNC: 4 MMOL/L (ref 0–18)
AST SERPL-CCNC: 26 U/L (ref ?–34)
BILIRUB SERPL-MCNC: 0.4 MG/DL (ref 0.2–1.1)
BUN BLD-MCNC: 12 MG/DL (ref 9–23)
CALCIUM BLD-MCNC: 9.4 MG/DL (ref 8.7–10.4)
CHLORIDE SERPL-SCNC: 105 MMOL/L (ref 98–112)
CHOLEST SERPL-MCNC: 188 MG/DL (ref ?–200)
CO2 SERPL-SCNC: 29 MMOL/L (ref 21–32)
CREAT BLD-MCNC: 1.11 MG/DL
EGFRCR SERPLBLD CKD-EPI 2021: 55 ML/MIN/1.73M2 (ref 60–?)
FASTING PATIENT LIPID ANSWER: NO
FASTING STATUS PATIENT QL REPORTED: NO
GLOBULIN PLAS-MCNC: 3.1 G/DL (ref 2–3.5)
GLUCOSE BLD-MCNC: 90 MG/DL (ref 70–99)
HDLC SERPL-MCNC: 45 MG/DL (ref 40–59)
LDLC SERPL CALC-MCNC: 118 MG/DL (ref ?–100)
NONHDLC SERPL-MCNC: 143 MG/DL (ref ?–130)
OSMOLALITY SERPL CALC.SUM OF ELEC: 285 MOSM/KG (ref 275–295)
POTASSIUM SERPL-SCNC: 3.8 MMOL/L (ref 3.5–5.1)
PROT SERPL-MCNC: 8.1 G/DL (ref 5.7–8.2)
SODIUM SERPL-SCNC: 138 MMOL/L (ref 136–145)
T3FREE SERPL-MCNC: 2.46 PG/ML (ref 2.4–4.2)
T4 FREE SERPL-MCNC: 1.4 NG/DL (ref 0.8–1.7)
TRIGL SERPL-MCNC: 140 MG/DL (ref 30–149)
TSI SER-ACNC: 6.99 MIU/ML (ref 0.55–4.78)
VLDLC SERPL CALC-MCNC: 24 MG/DL (ref 0–30)

## 2024-08-01 PROCEDURE — 73502 X-RAY EXAM HIP UNI 2-3 VIEWS: CPT | Performed by: FAMILY MEDICINE

## 2024-08-02 DIAGNOSIS — Z79.899 MEDICATION DOSE CHANGED: ICD-10-CM

## 2024-08-02 DIAGNOSIS — E78.2 MIXED HYPERLIPIDEMIA: ICD-10-CM

## 2024-08-02 DIAGNOSIS — E78.00 HYPERCHOLESTEREMIA: Primary | ICD-10-CM

## 2024-08-02 DIAGNOSIS — E03.9 ACQUIRED HYPOTHYROIDISM: Primary | ICD-10-CM

## 2024-08-02 RX ORDER — LEVOTHYROXINE SODIUM 0.03 MG/1
25 TABLET ORAL
Qty: 90 TABLET | Refills: 0 | Status: SHIPPED | OUTPATIENT
Start: 2024-08-02

## 2024-08-02 RX ORDER — ATORVASTATIN CALCIUM 20 MG/1
TABLET, FILM COATED ORAL
Qty: 90 TABLET | Refills: 0 | Status: SHIPPED | OUTPATIENT
Start: 2024-08-02

## 2024-08-02 RX ORDER — LEVOTHYROXINE SODIUM 0.12 MG/1
125 TABLET ORAL
COMMUNITY
Start: 2024-08-02

## 2024-08-02 RX ORDER — ATORVASTATIN CALCIUM 40 MG/1
40 TABLET, FILM COATED ORAL NIGHTLY
Qty: 90 TABLET | Refills: 0 | Status: SHIPPED | OUTPATIENT
Start: 2024-08-02

## 2024-08-24 DIAGNOSIS — I10 ESSENTIAL HYPERTENSION: ICD-10-CM

## 2024-08-24 RX ORDER — TORSEMIDE 20 MG/1
20 TABLET ORAL DAILY
Qty: 90 TABLET | Refills: 1 | Status: SHIPPED | OUTPATIENT
Start: 2024-08-24

## 2024-08-24 NOTE — TELEPHONE ENCOUNTER
OV 07/31/24  LABS 08/2024    REFILL 11/22/23 #90 +1 RF - dispensed 05/19/24 #90    No future appointments.

## 2024-09-03 NOTE — TELEPHONE ENCOUNTER
Patient has a MRI scheduled for next week and she is asking for a medication to help her get through the procedure. [facility] in [place].

## 2024-09-06 NOTE — TELEPHONE ENCOUNTER
Patient called back - informed that Xanax was going to be prescribed.    Pharmacy [facility] [place].

## 2024-09-13 ENCOUNTER — TELEPHONE (OUTPATIENT)
Dept: FAMILY MEDICINE CLINIC | Facility: CLINIC | Age: 64
End: 2024-09-13

## 2024-09-13 DIAGNOSIS — M48.061 LUMBAR STENOSIS WITHOUT NEUROGENIC CLAUDICATION: Primary | ICD-10-CM

## 2024-09-13 NOTE — TELEPHONE ENCOUNTER
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   I will refer you to Dr. Roy - neurosurgery and I suspect you will need to be treated by the pain clinic as well.

## 2024-09-16 ENCOUNTER — TELEPHONE (OUTPATIENT)
Dept: FAMILY MEDICINE CLINIC | Facility: CLINIC | Age: 64
End: 2024-09-16

## 2024-09-16 NOTE — TELEPHONE ENCOUNTER
Patient had MRI of on 9/12/2024 at [facility] at [address].  Report in care Everywhere.  Printed off and to Dr Tamez desk.  Patient experiencing hands and feet going numb and painful.  Hsa muscle cramps in leg muscles/ very uncomfortable.  Asking if Dr Tamez would call her and let her know next plan of treatment would be.  She isn't sleeping well.  Routed to Dr Tamez

## 2024-09-17 ENCOUNTER — OFFICE VISIT (OUTPATIENT)
Dept: FAMILY MEDICINE CLINIC | Facility: CLINIC | Age: 64
End: 2024-09-17
Payer: MEDICARE

## 2024-09-17 VITALS
HEIGHT: 68 IN | RESPIRATION RATE: 18 BRPM | WEIGHT: 246 LBS | SYSTOLIC BLOOD PRESSURE: 152 MMHG | DIASTOLIC BLOOD PRESSURE: 80 MMHG | OXYGEN SATURATION: 97 % | TEMPERATURE: 98 F | BODY MASS INDEX: 37.28 KG/M2 | HEART RATE: 74 BPM

## 2024-09-17 DIAGNOSIS — J02.9 SORE THROAT: ICD-10-CM

## 2024-09-17 DIAGNOSIS — J45.41 MODERATE PERSISTENT ASTHMA WITH EXACERBATION (HCC): Primary | ICD-10-CM

## 2024-09-17 DIAGNOSIS — Z11.52 ENCOUNTER FOR SCREENING FOR COVID-19: ICD-10-CM

## 2024-09-17 LAB
CONTROL LINE PRESENT WITH A CLEAR BACKGROUND (YES/NO): YES YES/NO
KIT LOT #: NORMAL NUMERIC
OPERATOR ID: NORMAL
POCT LOT NUMBER: NORMAL
RAPID SARS-COV-2 BY PCR: NOT DETECTED

## 2024-09-17 PROCEDURE — U0002 COVID-19 LAB TEST NON-CDC: HCPCS | Performed by: FAMILY MEDICINE

## 2024-09-17 PROCEDURE — 99213 OFFICE O/P EST LOW 20 MIN: CPT | Performed by: FAMILY MEDICINE

## 2024-09-17 PROCEDURE — 87880 STREP A ASSAY W/OPTIC: CPT | Performed by: FAMILY MEDICINE

## 2024-09-17 RX ORDER — PREDNISONE 20 MG/1
TABLET ORAL
Qty: 17 TABLET | Refills: 0 | Status: SHIPPED | OUTPATIENT
Start: 2024-09-17 | End: 2024-10-01

## 2024-09-17 NOTE — TELEPHONE ENCOUNTER
Called patient. Reviewed my chart message from 9/13/24 as below, patient understands.     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   I will refer you to Dr. Roy - neurosurgery and I suspect you will need to be treated by the pain clinic as well.     Provider Address Phone   Kg Roy  [address], [place] [id number] [phone]         Pt also complaining of sore throat, no appointments available today, pt agrees to going to walk in clinic, reviewed how to make appt on my chart for walk in, pt understands.

## 2024-09-17 NOTE — PROGRESS NOTES
Margarita Summers is a 64 year old female.    S:  Patient presents today with the following concerns:  Chief Complaint   Patient presents with    Sore Throat     Sore throat and white spots on tonsils . Started over the weekend   NO exposure    Asthma is acting up.  Chest is tight.  Breathing more of an issue.  This happens every fall.   She is using her nebulizer.  Denies fevers. No N/V/D.      Current Outpatient Medications   Medication Sig Dispense Refill    predniSONE 20 MG Oral Tab Take 2 tablets (40 mg total) by mouth daily for 5 days, THEN 1 tablet (20 mg total) daily for 5 days, THEN 0.5 tablets (10 mg total) daily for 4 days. 17 tablet 0    ALPRAZolam (XANAX) 0.5 MG Oral Tab Take 1 tablet 30 minutes before procedure. Can repeat at time of procedure and 30 minutes later. 5 tablet 0    TORSEMIDE 20 MG Oral Tab TAKE 1 TABLET BY MOUTH EVERY DAY 90 tablet 1    atorvastatin 20 MG Oral Tab Take 20 mg atorvastatin with your 40 mg to equal 60 mg 90 tablet 0    atorvastatin 40 MG Oral Tab Take 1 tablet (40 mg total) by mouth nightly. Take 1 tablet (40mg) with 20mg tablet to equal 60mg nightly 90 tablet 0    levothyroxine 25 MCG Oral Tab Take 1 tablet (25 mcg total) by mouth before breakfast. Take 1 tablet (25mcg) with 125mg tablet to equal 150mcg daily. 90 tablet 0    levothyroxine 125 MCG Oral Tab Take 1 tablet (125 mcg total) by mouth before breakfast. Dose adjustment on 08/02/24  Pt needs to take 1 tablet (125 mcg) with the 25mcg tablet to equal 150mcg daily.      pyridoxine 50 MG Oral Tab Take 1 tablet (50 mg total) by mouth daily.      methocarbamol 750 MG Oral Tab Take 1 tablet (750 mg total) by mouth 4 (four) times daily as needed.      methylPREDNISolone (MEDROL) 4 MG Oral Tablet Therapy Pack As directed. 1 each 1    HYDROcodone-acetaminophen  MG Oral Tab Take 1 tablet by mouth every 6 (six) hours as needed for Pain. 60 tablet 0    Fluticasone Propionate  MCG/ACT Inhalation Aerosol Inhale 2 puffs  into the lungs 2 (two) times daily. 3 each 2    fluticasone-salmeterol 230-21 MCG/ACT Inhalation Aerosol Inhale 1 puff into the lungs 2 (two) times daily. 1 each 3    zolpidem 5 MG Oral Tab Take 1 tablet (5 mg total) by mouth nightly as needed for Sleep. 30 tablet 0    ezetimibe (ZETIA) 10 MG Oral Tab Take 1 tablet (10 mg total) by mouth daily. 90 tablet 3    CARVEDILOL 12.5 MG Oral Tab TAKE 1 TABLET BY MOUTH IN THE MORNING AND IN THE EVENING WITH MEALS (Patient taking differently: Take 0.5 tablets (6.25 mg total) by mouth 2 (two) times daily with meals.) 180 tablet 0    montelukast (SINGULAIR) 10 MG Oral Tab Take 1 tablet (10 mg total) by mouth daily. 90 tablet 3    albuterol (2.5 MG/3ML) 0.083% Inhalation Nebu Soln Take 3 mL (2.5 mg total) by nebulization every 4 (four) hours as needed for Wheezing. 50 each 3    Albuterol Sulfate HFA (PROAIR HFA) 108 (90 Base) MCG/ACT Inhalation Aero Soln Inhale 2 puffs into the lungs every 4 (four) hours as needed for Wheezing. 1 Inhaler 6     Patient Active Problem List   Diagnosis    Allergic rhinitis    Edema    Symptomatic menopausal or female climacteric states    Hypothyroidism    Pure hypercholesterolemia    Hypertension    Mild intermittent asthma (HCC)    Leucocytoclastic vasculitis (HCC)    Reflex sympathetic dystrophy of right lower extremity    GERD (gastroesophageal reflux disease)    Rotator cuff tear arthropathy, right    Tendinitis of right rotator cuff    Incomplete tear of right rotator cuff    Stress reaction of bone    NSTEMI (non-ST elevated myocardial infarction) (HCC)    Traumatic complete tear of left rotator cuff    Hyperlipidemia    Coronary atherosclerosis    Asthma (HCC)     Family History   Problem Relation Age of Onset    Hypertension Mother     Other (Other) Mother         S/P krys    Cancer Maternal Grandmother         colon cancer    Cancer Sister         rectal cancer    Other (Other) Sister         GERD       REVIEW OF SYSTEMS:  GENERAL: feels  well otherwise  SKIN: denies any unusual skin lesions  EYES:denies vision change  LUNGS: denies shortness of breath with exertion  CARDIOVASCULAR: denies chest pain on exertion  GI: denies abdominal pain.  No N/V/D/C  : denies dysuria  MUSCULOSKELETAL: denies back pain  NEURO: denies headaches    EXAM:  /80   Pulse 74   Temp 97.5 °F (36.4 °C)   Resp 18   Ht 5' 8\" (1.727 m)   Wt 246 lb (111.6 kg)   LMP  (LMP Unknown)   SpO2 97%   BMI 37.40 kg/m²   Physical Exam  Constitutional:       General: She is not in acute distress.     Appearance: Normal appearance. She is not ill-appearing, toxic-appearing or diaphoretic.   HENT:      Head: Normocephalic and atraumatic.      Right Ear: Tympanic membrane and ear canal normal.      Left Ear: Tympanic membrane and ear canal normal.      Nose: Nose normal.      Mouth/Throat:      Comments: Tonsils without erythema.  Small exudate right tonsil.  Eyes:      Extraocular Movements: Extraocular movements intact.      Conjunctiva/sclera: Conjunctivae normal.      Pupils: Pupils are equal, round, and reactive to light.   Cardiovascular:      Rate and Rhythm: Normal rate and regular rhythm.      Heart sounds: Normal heart sounds.   Pulmonary:      Effort: Pulmonary effort is normal.      Breath sounds: Normal breath sounds. Decreased air movement present.   Musculoskeletal:      Cervical back: Neck supple. No rigidity or tenderness.   Lymphadenopathy:      Cervical: No cervical adenopathy.   Skin:     General: Skin is warm and dry.   Neurological:      General: No focal deficit present.      Mental Status: She is alert and oriented to person, place, and time.   Psychiatric:         Mood and Affect: Mood normal.         Behavior: Behavior normal.      Rapid Covid test is Negative.  Rapid Strep test is Negative.    ASSESSMENT AND PLAN:  Margarita Summers is a 64 year old female.  Encounter Diagnoses   Name Primary?    Moderate persistent asthma with exacerbation (HCC) Yes     Sore throat     Encounter for screening for COVID-19        No results found.     Orders Placed This Encounter   Procedures    Strep A Assay W/Optic    COVID-19 LAB TEST NON-CDC     Meds & Refills for this Visit:  Requested Prescriptions     Signed Prescriptions Disp Refills    predniSONE 20 MG Oral Tab 17 tablet 0     Sig: Take 2 tablets (40 mg total) by mouth daily for 5 days, THEN 1 tablet (20 mg total) daily for 5 days, THEN 0.5 tablets (10 mg total) daily for 4 days.     Imaging & Consults:  None  Strep test is negative.  Patient declines throat culture.    Likely viral etiology for sore throat.  Asthma is exacerbated.  Will start Prednisone as above.    Continue inhaler/nebulizer.    Fluids, rest.    Go to ED with severe dyspnea.    Patient verbalizes understanding of plan.    No follow-ups on file.

## 2024-09-23 ENCOUNTER — OFFICE VISIT (OUTPATIENT)
Dept: FAMILY MEDICINE CLINIC | Facility: CLINIC | Age: 64
End: 2024-09-23
Payer: COMMERCIAL

## 2024-09-23 ENCOUNTER — APPOINTMENT (OUTPATIENT)
Dept: GENERAL RADIOLOGY | Age: 64
End: 2024-09-23
Attending: NURSE PRACTITIONER
Payer: MEDICARE

## 2024-09-23 ENCOUNTER — HOSPITAL ENCOUNTER (OUTPATIENT)
Age: 64
Discharge: HOME | End: 2024-09-23
Payer: MEDICARE

## 2024-09-23 VITALS
WEIGHT: 246 LBS | BODY MASS INDEX: 37.28 KG/M2 | DIASTOLIC BLOOD PRESSURE: 86 MMHG | RESPIRATION RATE: 18 BRPM | OXYGEN SATURATION: 98 % | HEART RATE: 67 BPM | HEIGHT: 68 IN | TEMPERATURE: 97 F | SYSTOLIC BLOOD PRESSURE: 146 MMHG

## 2024-09-23 VITALS
DIASTOLIC BLOOD PRESSURE: 83 MMHG | HEART RATE: 71 BPM | RESPIRATION RATE: 20 BRPM | SYSTOLIC BLOOD PRESSURE: 154 MMHG | OXYGEN SATURATION: 99 % | TEMPERATURE: 98 F

## 2024-09-23 DIAGNOSIS — J45.909 MODERATE ASTHMA, UNSPECIFIED WHETHER COMPLICATED, UNSPECIFIED WHETHER PERSISTENT (HCC): Primary | ICD-10-CM

## 2024-09-23 DIAGNOSIS — R05.1 ACUTE COUGH: Primary | ICD-10-CM

## 2024-09-23 LAB
#MXD IC: 0.9 X10ˆ3/UL (ref 0.1–1)
ATRIAL RATE: 62 BPM
BUN BLD-MCNC: 12 MG/DL (ref 7–18)
CHLORIDE BLD-SCNC: 105 MMOL/L (ref 98–112)
CO2 BLD-SCNC: 25 MMOL/L (ref 21–32)
CREAT BLD-MCNC: 1 MG/DL
DDIMER WHOLE BLOOD: <200 NG/ML DDU (ref ?–400)
EGFRCR SERPLBLD CKD-EPI 2021: 63 ML/MIN/1.73M2 (ref 60–?)
GLUCOSE BLD-MCNC: 97 MG/DL (ref 70–99)
HCT VFR BLD AUTO: 41.8 %
HCT VFR BLD CALC: 42 %
HGB BLD-MCNC: 13.6 G/DL
ISTAT IONIZED CALCIUM FOR CHEM 8: 1.13 MMOL/L (ref 1.12–1.32)
LYMPHOCYTES # BLD AUTO: 2 X10ˆ3/UL (ref 1–4)
LYMPHOCYTES NFR BLD AUTO: 17.6 %
MCH RBC QN AUTO: 27.8 PG (ref 26–34)
MCHC RBC AUTO-ENTMCNC: 32.5 G/DL (ref 31–37)
MCV RBC AUTO: 85.5 FL (ref 80–100)
MIXED CELL %: 7.8 %
NEUTROPHILS # BLD AUTO: 8.5 X10ˆ3/UL (ref 1.5–7.7)
NEUTROPHILS NFR BLD AUTO: 74.6 %
P AXIS: 0 DEGREES
P-R INTERVAL: 126 MS
PLATELET # BLD AUTO: 244 X10ˆ3/UL (ref 150–450)
POTASSIUM BLD-SCNC: 3.8 MMOL/L (ref 3.6–5.1)
Q-T INTERVAL: 418 MS
QRS DURATION: 66 MS
QTC CALCULATION (BEZET): 424 MS
R AXIS: 18 DEGREES
RBC # BLD AUTO: 4.89 X10ˆ6/UL
SODIUM BLD-SCNC: 142 MMOL/L (ref 136–145)
T AXIS: 44 DEGREES
TROPONIN I BLD-MCNC: <0.02 NG/ML
VENTRICULAR RATE: 62 BPM
WBC # BLD AUTO: 11.4 X10ˆ3/UL (ref 4–11)

## 2024-09-23 PROCEDURE — 84484 ASSAY OF TROPONIN QUANT: CPT | Performed by: NURSE PRACTITIONER

## 2024-09-23 PROCEDURE — 85378 FIBRIN DEGRADE SEMIQUANT: CPT | Performed by: NURSE PRACTITIONER

## 2024-09-23 PROCEDURE — 94640 AIRWAY INHALATION TREATMENT: CPT | Performed by: NURSE PRACTITIONER

## 2024-09-23 PROCEDURE — 99214 OFFICE O/P EST MOD 30 MIN: CPT | Performed by: NURSE PRACTITIONER

## 2024-09-23 PROCEDURE — 93000 ELECTROCARDIOGRAM COMPLETE: CPT | Performed by: NURSE PRACTITIONER

## 2024-09-23 PROCEDURE — 85025 COMPLETE CBC W/AUTO DIFF WBC: CPT | Performed by: NURSE PRACTITIONER

## 2024-09-23 PROCEDURE — 80047 BASIC METABLC PNL IONIZED CA: CPT | Performed by: NURSE PRACTITIONER

## 2024-09-23 PROCEDURE — 71046 X-RAY EXAM CHEST 2 VIEWS: CPT | Performed by: NURSE PRACTITIONER

## 2024-09-23 RX ORDER — BENZONATATE 100 MG/1
100 CAPSULE ORAL 3 TIMES DAILY PRN
Qty: 30 CAPSULE | Refills: 0 | Status: SHIPPED | OUTPATIENT
Start: 2024-09-23 | End: 2024-10-23

## 2024-09-23 RX ORDER — PREDNISONE 20 MG/1
40 TABLET ORAL DAILY
Qty: 10 TABLET | Refills: 0 | Status: SHIPPED | OUTPATIENT
Start: 2024-09-23 | End: 2024-09-28

## 2024-09-23 RX ORDER — IPRATROPIUM BROMIDE AND ALBUTEROL SULFATE 2.5; .5 MG/3ML; MG/3ML
3 SOLUTION RESPIRATORY (INHALATION) ONCE
Status: COMPLETED | OUTPATIENT
Start: 2024-09-23 | End: 2024-09-23

## 2024-09-23 NOTE — PROGRESS NOTES
Margarita Summers is a 64 year old female.    S:  Patient presents today with the following concerns:  Chief Complaint   Patient presents with    Asthma, Mild     Seen last week. In chest now. Need abt. - Entered by patient    Patient notes minimal improvement in her cough with prednisone.    Coughing up thick mucous now and congestion in chest.  Felt warm but no fevers.    Denies N/V/D.    Denies chest pain.    Current Outpatient Medications   Medication Sig Dispense Refill    predniSONE 20 MG Oral Tab Take 2 tablets (40 mg total) by mouth daily for 5 days, THEN 1 tablet (20 mg total) daily for 5 days, THEN 0.5 tablets (10 mg total) daily for 4 days. 17 tablet 0    ALPRAZolam (XANAX) 0.5 MG Oral Tab Take 1 tablet 30 minutes before procedure. Can repeat at time of procedure and 30 minutes later. 5 tablet 0    TORSEMIDE 20 MG Oral Tab TAKE 1 TABLET BY MOUTH EVERY DAY 90 tablet 1    atorvastatin 20 MG Oral Tab Take 20 mg atorvastatin with your 40 mg to equal 60 mg 90 tablet 0    atorvastatin 40 MG Oral Tab Take 1 tablet (40 mg total) by mouth nightly. Take 1 tablet (40mg) with 20mg tablet to equal 60mg nightly 90 tablet 0    levothyroxine 25 MCG Oral Tab Take 1 tablet (25 mcg total) by mouth before breakfast. Take 1 tablet (25mcg) with 125mg tablet to equal 150mcg daily. 90 tablet 0    levothyroxine 125 MCG Oral Tab Take 1 tablet (125 mcg total) by mouth before breakfast. Dose adjustment on 08/02/24  Pt needs to take 1 tablet (125 mcg) with the 25mcg tablet to equal 150mcg daily.      pyridoxine 50 MG Oral Tab Take 1 tablet (50 mg total) by mouth daily.      methocarbamol 750 MG Oral Tab Take 1 tablet (750 mg total) by mouth 4 (four) times daily as needed.      methylPREDNISolone (MEDROL) 4 MG Oral Tablet Therapy Pack As directed. 1 each 1    HYDROcodone-acetaminophen  MG Oral Tab Take 1 tablet by mouth every 6 (six) hours as needed for Pain. 60 tablet 0    Fluticasone Propionate  MCG/ACT Inhalation Aerosol  Inhale 2 puffs into the lungs 2 (two) times daily. 3 each 2    fluticasone-salmeterol 230-21 MCG/ACT Inhalation Aerosol Inhale 1 puff into the lungs 2 (two) times daily. 1 each 3    zolpidem 5 MG Oral Tab Take 1 tablet (5 mg total) by mouth nightly as needed for Sleep. 30 tablet 0    ezetimibe (ZETIA) 10 MG Oral Tab Take 1 tablet (10 mg total) by mouth daily. 90 tablet 3    CARVEDILOL 12.5 MG Oral Tab TAKE 1 TABLET BY MOUTH IN THE MORNING AND IN THE EVENING WITH MEALS (Patient taking differently: Take 0.5 tablets (6.25 mg total) by mouth 2 (two) times daily with meals.) 180 tablet 0    montelukast (SINGULAIR) 10 MG Oral Tab Take 1 tablet (10 mg total) by mouth daily. 90 tablet 3    albuterol (2.5 MG/3ML) 0.083% Inhalation Nebu Soln Take 3 mL (2.5 mg total) by nebulization every 4 (four) hours as needed for Wheezing. 50 each 3    Albuterol Sulfate HFA (PROAIR HFA) 108 (90 Base) MCG/ACT Inhalation Aero Soln Inhale 2 puffs into the lungs every 4 (four) hours as needed for Wheezing. 1 Inhaler 6     Patient Active Problem List   Diagnosis    Allergic rhinitis    Edema    Symptomatic menopausal or female climacteric states    Hypothyroidism    Pure hypercholesterolemia    Hypertension    Mild intermittent asthma (HCC)    Leucocytoclastic vasculitis (HCC)    Reflex sympathetic dystrophy of right lower extremity    GERD (gastroesophageal reflux disease)    Rotator cuff tear arthropathy, right    Tendinitis of right rotator cuff    Incomplete tear of right rotator cuff    Stress reaction of bone    NSTEMI (non-ST elevated myocardial infarction) (HCC)    Traumatic complete tear of left rotator cuff    Hyperlipidemia    Coronary atherosclerosis    Asthma (HCC)     Family History   Problem Relation Age of Onset    Hypertension Mother     Other (Other) Mother         S/P krys    Cancer Maternal Grandmother         colon cancer    Cancer Sister         rectal cancer    Other (Other) Sister         GERD       REVIEW OF  SYSTEMS:  GENERAL: feels tired  SKIN: denies any unusual skin lesions  EYES:denies vision change  LUNGS: see above  CARDIOVASCULAR: denies chest pain on exertion  GI: denies abdominal pain.  No N/V/D/C  : denies dysuria  MUSCULOSKELETAL: denies back pain  NEURO: denies headaches    EXAM:  /86   Pulse 67   Temp 97.3 °F (36.3 °C)   Resp 18   Ht 5' 8\" (1.727 m)   Wt 246 lb (111.6 kg)   LMP  (LMP Unknown)   SpO2 98%   BMI 37.40 kg/m²   Physical Exam  Constitutional:       General: She is not in acute distress.     Appearance: Normal appearance. She is not ill-appearing, toxic-appearing or diaphoretic.   HENT:      Head: Normocephalic and atraumatic.      Right Ear: Tympanic membrane and ear canal normal.      Left Ear: Tympanic membrane and ear canal normal.   Eyes:      Extraocular Movements: Extraocular movements intact.      Conjunctiva/sclera: Conjunctivae normal.      Pupils: Pupils are equal, round, and reactive to light.   Cardiovascular:      Rate and Rhythm: Regular rhythm.      Heart sounds: Normal heart sounds.   Pulmonary:      Breath sounds: Decreased air movement present. No wheezing or rales.   Skin:     General: Skin is warm and dry.   Neurological:      General: No focal deficit present.      Mental Status: She is alert and oriented to person, place, and time.   Psychiatric:         Mood and Affect: Mood normal.         Behavior: Behavior normal.        ASSESSMENT AND PLAN:  Margarita Summers is a 64 year old female.  Encounter Diagnosis   Name Primary?    Acute cough Yes       No results found.     No orders of the defined types were placed in this encounter.    Meds & Refills for this Visit:  Requested Prescriptions      No prescriptions requested or ordered in this encounter     Imaging & Consults:  None  Discussed with patient that higher level of care would be appropriate.  Should have CXR.  Minimal improvement on prednisone.  Has asthma.    Spoke with [facility] provider and they are  willing to see her.  Vital signs stable.      Patient left the clinic in stable condition.  No follow-ups on file.
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
## 2024-10-27 DIAGNOSIS — E78.00 HYPERCHOLESTEREMIA: ICD-10-CM

## 2024-10-28 RX ORDER — LEVOTHYROXINE SODIUM 25 UG/1
TABLET ORAL
Qty: 90 TABLET | Refills: 0 | Status: SHIPPED | OUTPATIENT
Start: 2024-10-28

## 2024-10-28 RX ORDER — ATORVASTATIN CALCIUM 20 MG/1
TABLET, FILM COATED ORAL
Qty: 90 TABLET | Refills: 0 | Status: SHIPPED | OUTPATIENT
Start: 2024-10-28

## 2024-10-28 NOTE — TELEPHONE ENCOUNTER
Requested Prescriptions     Pending Prescriptions Disp Refills    atorvastatin 20 MG Oral Tab [Pharmacy Med Name: ATORVASTATIN 20 MG TABLET] 90 tablet 0     Sig: TAKE 1 TABLET BY MOUTH EVERY DAY WITH 40 MG TABS     Last refill 8/2/24 #90  LOV 7/31/24  No future appointments.  Last labs 7/31/24    Cholesterol Medication Protocol [id number]/27/2024 07:28 AM   Protocol Details ALT < 80    ALT resulted within past year    Lipid panel within past 12 months    In person appointment or virtual visit in the past 12 mos or appointment in next 3 mos

## 2024-10-28 NOTE — TELEPHONE ENCOUNTER
Last refill: 08/02/24  Qty 90  W/ 0 refills  Last ov: 07/31/24    Requested Prescriptions     Pending Prescriptions Disp Refills    LEVOTHYROXINE 25 MCG Oral Tab [Pharmacy Med Name: LEVOTHYROXINE 25 MCG TABLET] 90 tablet 0     Sig: TAKE 1 TABLET BY MOUTH BEFORE BREAKFAST WITH 125 MCG DOSE     No future appointments.

## 2024-11-12 ENCOUNTER — TELEPHONE (OUTPATIENT)
Dept: FAMILY MEDICINE CLINIC | Facility: CLINIC | Age: 64
End: 2024-11-12

## 2024-11-12 DIAGNOSIS — M25.552 BILATERAL HIP PAIN: ICD-10-CM

## 2024-11-12 DIAGNOSIS — M25.551 BILATERAL HIP PAIN: ICD-10-CM

## 2024-11-12 DIAGNOSIS — M54.16 LUMBAR BACK PAIN WITH RADICULOPATHY AFFECTING RIGHT LOWER EXTREMITY: ICD-10-CM

## 2024-11-12 DIAGNOSIS — M25.551 PAIN OF RIGHT HIP: ICD-10-CM

## 2024-11-12 RX ORDER — HYDROCODONE BITARTRATE AND ACETAMINOPHEN 10; 325 MG/1; MG/1
1 TABLET ORAL EVERY 6 HOURS PRN
Qty: 30 TABLET | Refills: 0 | Status: SHIPPED | OUTPATIENT
Start: 2024-11-12

## 2024-11-12 NOTE — TELEPHONE ENCOUNTER
Patient was referred to a specialist for back pain.  She can't get in with specialist until November 26th.  Patient will be flying on Thursday.  She wanted to know if  can give her something for the pain to make it through the flight.  Pharmacy:  [facility]

## 2024-11-12 NOTE — TELEPHONE ENCOUNTER
Last refill 7/31/24 #60 0ref  LOV 7/31/24  Future Appointments   Date Time Provider Department Center   11/26/2024  9:20 AM Kg Roy MD ENINAPER2 [facility]     Discussed with Dr. Tamez, will send in hydrocodone rx to [facility].

## 2024-11-26 ENCOUNTER — OFFICE VISIT (OUTPATIENT)
Dept: SURGERY | Facility: CLINIC | Age: 64
End: 2024-11-26
Payer: MEDICARE

## 2024-11-26 ENCOUNTER — TELEPHONE (OUTPATIENT)
Dept: NEUROLOGY | Facility: CLINIC | Age: 64
End: 2024-11-26

## 2024-11-26 VITALS
DIASTOLIC BLOOD PRESSURE: 78 MMHG | WEIGHT: 250 LBS | SYSTOLIC BLOOD PRESSURE: 132 MMHG | HEART RATE: 52 BPM | BODY MASS INDEX: 37.89 KG/M2 | HEIGHT: 68 IN

## 2024-11-26 DIAGNOSIS — M43.16 SPONDYLOLISTHESIS OF LUMBAR REGION: Primary | ICD-10-CM

## 2024-11-26 DIAGNOSIS — M46.1 SACROILIAC INFLAMMATION (HCC): ICD-10-CM

## 2024-11-26 PROCEDURE — 99204 OFFICE O/P NEW MOD 45 MIN: CPT | Performed by: NEUROLOGICAL SURGERY

## 2024-11-26 NOTE — PATIENT INSTRUCTIONS
Refill policies:    Allow 2-3 business days for refills; controlled substances may take longer.  Contact your pharmacy at least 5 days prior to running out of medication and have them send an electronic request or submit request through the “request refill” option in your [employer] account.  Refills are not addressed on weekends; covering physicians do not authorize routine medications on weekends.  No narcotics or controlled substances are refilled after noon on Fridays or by on call physicians.  By law, narcotics must be electronically prescribed.  A 30 day supply with no refills is the maximum allowed.  If your prescription is due for a refill, you may be due for a follow up appointment.  To best provide you care, patients receiving routine medications need to be seen at least once a year.  Patients receiving narcotic/controlled substance medications need to be seen at least once every 3 months.  In the event that your preferred pharmacy does not have the requested medication in stock (e.g. Backordered), it is your responsibility to find another pharmacy that has the requested medication available.  We will gladly send a new prescription to that pharmacy at your request.    Scheduling Tests:    If your physician has ordered radiology tests such as MRI or CT scans, please contact Central Scheduling at [phone] right away to schedule the test.  Once scheduled, the [facility] Centralized Referral Team will work with your insurance carrier to obtain pre-certification or prior authorization.  Depending on your insurance carrier, approval may take 3-10 days.  It is highly recommended patients assure they have received an authorization before having a test performed.  If test is done without insurance authorization, patient may be responsible for the entire amount billed.      Precertification and Prior Authorizations:  If your physician has recommended that you have a procedure or additional testing performed the [facility]  Centralized Referral Team will contact your insurance carrier to obtain pre-certification or prior authorization.    You are strongly encouraged to contact your insurance carrier to verify that your procedure/test has been approved and is a COVERED benefit.  Although the [facility] Centralized Referral Team does its due diligence, the insurance carrier gives the disclaimer that \"Although the procedure is authorized, this does not guarantee payment.\"    Ultimately the patient is responsible for payment.   Thank you for your understanding in this matter.  Paperwork Completion:  If you require FMLA or disability paperwork for your recovery, please make sure to either drop it off or have it faxed to our office at [phone]. Be sure the form has your name and date of birth on it.  The form will be faxed to our Forms Department and they will complete it for you.  There is a 25$ fee for all forms that need to be filled out.  Please be aware there is a 10-14 day turnaround time.  You will need to sign a release of information (JUANITO) form if your paperwork does not come with one.  You may call the Forms Department with any questions at [phone].  Their fax number is [phone].
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
## 2024-11-26 NOTE — PROGRESS NOTES
New patient here for lumbar stenosis w/o neurogenic claudication.     Not able to sleep due to sharp pain from Lumbar down to right leg. Trouble bending, lifting, walking.     Patient has been using ice and heat.     PAIN   Patient is here for lumbar stenosis.    Location of Pain: lumbar down to right leg    Date Pain Began: years ago        Symptoms include:  ---pain radiating? Right leg  ---weakness? Lower legs  ---numbness/tingling? All the time. Especially in hands.         Past Treatments:   ---physical therapy? Patient denies  ---injections? 2 injections that were years ago. Did not help solve symptoms.   ---medications? Norco, Methocarbamol  ---prior surgery? C2-6 spine fusion w/ plate 10/2007      Prior diagnostic testing MRI Lumbar 9/24/24.

## 2024-11-26 NOTE — TELEPHONE ENCOUNTER
Received disk from patient and uploaded via [employer]. Done at [facility] 9/12/2024.     MRI Lumbar Spine without IV Contrast     Disk returned to patient.

## 2024-12-03 ENCOUNTER — HOSPITAL ENCOUNTER (OUTPATIENT)
Age: 64
Discharge: HOME | End: 2024-12-03
Payer: MEDICARE

## 2024-12-03 VITALS
TEMPERATURE: 97 F | DIASTOLIC BLOOD PRESSURE: 58 MMHG | SYSTOLIC BLOOD PRESSURE: 149 MMHG | RESPIRATION RATE: 16 BRPM | OXYGEN SATURATION: 98 % | HEART RATE: 62 BPM

## 2024-12-03 DIAGNOSIS — A08.4 VIRAL GASTROENTERITIS: ICD-10-CM

## 2024-12-03 DIAGNOSIS — R11.2 NAUSEA AND VOMITING IN ADULT: Primary | ICD-10-CM

## 2024-12-03 LAB
#MXD IC: 0.6 X10ˆ3/UL (ref 0.1–1)
BILIRUB UR QL STRIP: NEGATIVE
BUN BLD-MCNC: 14 MG/DL (ref 7–18)
CHLORIDE BLD-SCNC: 104 MMOL/L (ref 98–112)
CLARITY UR: CLEAR
CO2 BLD-SCNC: 25 MMOL/L (ref 21–32)
CREAT BLD-MCNC: 1.1 MG/DL
EGFRCR SERPLBLD CKD-EPI 2021: 56 ML/MIN/1.73M2 (ref 60–?)
GLUCOSE BLD-MCNC: 95 MG/DL (ref 70–99)
GLUCOSE UR STRIP-MCNC: NEGATIVE MG/DL
HCT VFR BLD AUTO: 46.5 %
HCT VFR BLD CALC: 44 %
HGB BLD-MCNC: 15.1 G/DL
HGB UR QL STRIP: NEGATIVE
ISTAT IONIZED CALCIUM FOR CHEM 8: 1.19 MMOL/L (ref 1.12–1.32)
LYMPHOCYTES # BLD AUTO: 1.3 X10ˆ3/UL (ref 1–4)
LYMPHOCYTES NFR BLD AUTO: 31.6 %
MCH RBC QN AUTO: 27.6 PG (ref 26–34)
MCHC RBC AUTO-ENTMCNC: 32.5 G/DL (ref 31–37)
MCV RBC AUTO: 84.9 FL (ref 80–100)
MIXED CELL %: 14.4 %
NEUTROPHILS # BLD AUTO: 2.3 X10ˆ3/UL (ref 1.5–7.7)
NEUTROPHILS NFR BLD AUTO: 54 %
NITRITE UR QL STRIP: NEGATIVE
PH UR STRIP: 6 [PH]
PLATELET # BLD AUTO: 216 X10ˆ3/UL (ref 150–450)
POCT INFLUENZA A: NEGATIVE
POCT INFLUENZA B: NEGATIVE
POTASSIUM BLD-SCNC: 3.7 MMOL/L (ref 3.6–5.1)
RBC # BLD AUTO: 5.48 X10ˆ6/UL
SARS-COV-2 RNA RESP QL NAA+PROBE: NOT DETECTED
SODIUM BLD-SCNC: 141 MMOL/L (ref 136–145)
SP GR UR STRIP: 1.02
UROBILINOGEN UR STRIP-ACNC: <2 MG/DL
WBC # BLD AUTO: 4.2 X10ˆ3/UL (ref 4–11)

## 2024-12-03 PROCEDURE — 99214 OFFICE O/P EST MOD 30 MIN: CPT | Performed by: NURSE PRACTITIONER

## 2024-12-03 PROCEDURE — 87502 INFLUENZA DNA AMP PROBE: CPT | Performed by: NURSE PRACTITIONER

## 2024-12-03 PROCEDURE — 96374 THER/PROPH/DIAG INJ IV PUSH: CPT | Performed by: NURSE PRACTITIONER

## 2024-12-03 PROCEDURE — U0002 COVID-19 LAB TEST NON-CDC: HCPCS | Performed by: NURSE PRACTITIONER

## 2024-12-03 PROCEDURE — 85025 COMPLETE CBC W/AUTO DIFF WBC: CPT | Performed by: NURSE PRACTITIONER

## 2024-12-03 PROCEDURE — 81002 URINALYSIS NONAUTO W/O SCOPE: CPT | Performed by: NURSE PRACTITIONER

## 2024-12-03 PROCEDURE — 80047 BASIC METABLC PNL IONIZED CA: CPT | Performed by: NURSE PRACTITIONER

## 2024-12-03 RX ORDER — ONDANSETRON 4 MG/1
4 TABLET, ORALLY DISINTEGRATING ORAL EVERY 4 HOURS PRN
Qty: 10 TABLET | Refills: 0 | Status: SHIPPED | OUTPATIENT
Start: 2024-12-03 | End: 2024-12-10

## 2024-12-03 RX ORDER — DICYCLOMINE HCL 20 MG
20 TABLET ORAL 4 TIMES DAILY PRN
Qty: 30 TABLET | Refills: 0 | Status: SHIPPED | OUTPATIENT
Start: 2024-12-03 | End: 2025-01-02

## 2024-12-03 RX ORDER — SODIUM CHLORIDE 9 MG/ML
1000 INJECTION, SOLUTION INTRAVENOUS ONCE
Status: COMPLETED | OUTPATIENT
Start: 2024-12-03 | End: 2024-12-03

## 2024-12-03 RX ORDER — ONDANSETRON 2 MG/ML
4 INJECTION INTRAMUSCULAR; INTRAVENOUS ONCE
Status: COMPLETED | OUTPATIENT
Start: 2024-12-03 | End: 2024-12-03

## 2024-12-03 RX ORDER — DICYCLOMINE HYDROCHLORIDE 10 MG/5ML
20 SOLUTION ORAL ONCE
Status: COMPLETED | OUTPATIENT
Start: 2024-12-03 | End: 2024-12-03

## 2024-12-03 NOTE — DISCHARGE INSTRUCTIONS
Please increase your electrolyte rich fluids such as Gatorade, chicken broth or Pedialyte.  If your symptoms worsen in the next several hours to couple days please go to the emergency room for evaluation.

## 2024-12-03 NOTE — ED INITIAL ASSESSMENT (HPI)
Pt sts n/v/d 5 days ago. No vomiting for the past 3 days. Nausea continues today, \"tasting bile\", unable to tolerate solids, feeling dizzy/dehydrated, bloated.
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
## 2024-12-05 RX ORDER — ATORVASTATIN CALCIUM 40 MG/1
TABLET, FILM COATED ORAL
Qty: 90 TABLET | Refills: 0 | Status: SHIPPED | OUTPATIENT
Start: 2024-12-05

## 2024-12-05 NOTE — TELEPHONE ENCOUNTER
Patient takes 20mg and 40mg to equal 60mg. Please review and approve the duplicate med    Last refill: 08/02/24  Qty 90  W 0 refills  Last ov: 07/31/24    Requested Prescriptions     Pending Prescriptions Disp Refills    ATORVASTATIN 40 MG Oral Tab [Pharmacy Med Name: ATORVASTATIN 40 MG TABLET] 90 tablet 0     Sig: TAKE 1 TABLET BY MOUTH NIGHTLY WITH 20MG TABLET TO TOTAL 60MG     No future appointments.

## 2024-12-12 ENCOUNTER — LABORATORY ENCOUNTER (OUTPATIENT)
Dept: LAB | Age: 64
End: 2024-12-12
Attending: FAMILY MEDICINE
Payer: MEDICARE

## 2024-12-12 ENCOUNTER — HOSPITAL ENCOUNTER (OUTPATIENT)
Dept: GENERAL RADIOLOGY | Age: 64
Discharge: HOME | End: 2024-12-12
Attending: NEUROLOGICAL SURGERY
Payer: MEDICARE

## 2024-12-12 DIAGNOSIS — M46.1 SACROILIAC INFLAMMATION (HCC): ICD-10-CM

## 2024-12-12 DIAGNOSIS — E03.9 ACQUIRED HYPOTHYROIDISM: ICD-10-CM

## 2024-12-12 DIAGNOSIS — Z79.899 MEDICATION DOSE CHANGED: ICD-10-CM

## 2024-12-12 DIAGNOSIS — M43.16 SPONDYLOLISTHESIS OF LUMBAR REGION: ICD-10-CM

## 2024-12-12 DIAGNOSIS — E78.5 DYSLIPIDEMIA: ICD-10-CM

## 2024-12-12 DIAGNOSIS — E78.2 MIXED HYPERLIPIDEMIA: ICD-10-CM

## 2024-12-12 LAB
CHOLEST SERPL-MCNC: 130 MG/DL (ref ?–200)
FASTING PATIENT LIPID ANSWER: YES
HDLC SERPL-MCNC: 34 MG/DL (ref 40–59)
LDLC SERPL CALC-MCNC: 74 MG/DL (ref ?–100)
NONHDLC SERPL-MCNC: 96 MG/DL (ref ?–130)
T4 FREE SERPL-MCNC: 1.4 NG/DL (ref 0.8–1.7)
TRIGL SERPL-MCNC: 120 MG/DL (ref 30–149)
TSI SER-ACNC: 3.96 UIU/ML (ref 0.55–4.78)
VLDLC SERPL CALC-MCNC: 19 MG/DL (ref 0–30)

## 2024-12-12 PROCEDURE — 80061 LIPID PANEL: CPT

## 2024-12-12 PROCEDURE — 84443 ASSAY THYROID STIM HORMONE: CPT

## 2024-12-12 PROCEDURE — 36415 COLL VENOUS BLD VENIPUNCTURE: CPT

## 2024-12-12 PROCEDURE — 84439 ASSAY OF FREE THYROXINE: CPT

## 2024-12-12 PROCEDURE — 72100 X-RAY EXAM L-S SPINE 2/3 VWS: CPT | Performed by: NEUROLOGICAL SURGERY

## 2024-12-17 ENCOUNTER — TELEPHONE (OUTPATIENT)
Dept: PHYSICAL THERAPY | Facility: HOSPITAL | Age: 64
End: 2024-12-17

## 2024-12-18 ENCOUNTER — OFFICE VISIT (OUTPATIENT)
Dept: PHYSICAL THERAPY | Age: 64
End: 2024-12-18
Attending: NEUROLOGICAL SURGERY
Payer: MEDICARE

## 2024-12-18 DIAGNOSIS — M43.16 SPONDYLOLISTHESIS OF LUMBAR REGION: Primary | ICD-10-CM

## 2024-12-18 DIAGNOSIS — M46.1 SACROILIAC INFLAMMATION (HCC): ICD-10-CM

## 2024-12-18 PROCEDURE — 97161 PT EVAL LOW COMPLEX 20 MIN: CPT

## 2024-12-18 PROCEDURE — 97110 THERAPEUTIC EXERCISES: CPT
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
## 2024-12-18 NOTE — PATIENT INSTRUCTIONS
Thank you for coming to your physical therapy appt! During your appt today you were issued a home exercise program with a printed handout from [employer]. Your home exercise program can also be accessed using the information below. The selected exercises are meant to supplement the treatment you received and reinforce your progress made in physical therapy. Please complete these exercises as instructed by your physical therapist. It is important to stay consistent with your exercises to maximize your recovery!       Access Code: [email or web address]  URL: [email or web address]  Date: 12/18/2024  Prepared by: Leola Cook    Exercises  - Supine Posterior Pelvic Tilt  - 1 x daily - 7 x weekly - 2 sets - 10 reps - 2-3\" hold

## 2024-12-26 ENCOUNTER — TELEPHONE (OUTPATIENT)
Dept: PHYSICAL THERAPY | Age: 64
End: 2024-12-26

## 2025-01-03 ENCOUNTER — TELEPHONE (OUTPATIENT)
Dept: PHYSICAL THERAPY | Facility: HOSPITAL | Age: 65
End: 2025-01-03

## 2025-01-08 ENCOUNTER — OFFICE VISIT (OUTPATIENT)
Dept: PHYSICAL THERAPY | Age: 65
End: 2025-01-08
Attending: NEUROLOGICAL SURGERY
Payer: MEDICARE

## 2025-01-08 PROCEDURE — 97140 MANUAL THERAPY 1/> REGIONS: CPT

## 2025-01-08 PROCEDURE — 97110 THERAPEUTIC EXERCISES: CPT

## 2025-01-08 NOTE — PATIENT INSTRUCTIONS
Thank you for coming to your physical therapy appt! During your appt today you were issued a home exercise program with a printed handout from [employer]. Your home exercise program can also be accessed using the information below. The selected exercises are meant to supplement the treatment you received and reinforce your progress made in physical therapy. Please complete these exercises as instructed by your physical therapist. It is important to stay consistent with your exercises to maximize your recovery!       Access Code: [email or web address]  URL: [email or web address]  Date: 01/08/2025  Prepared by: Leola Cook    Exercises  - Supine Posterior Pelvic Tilt  - 1 x daily - 7 x weekly - 2 sets - 10 reps - 2-3\" hold  - Hooklying Single Knee to Chest Stretch  - 1 x daily - 7 x weekly - 1 reps - 30\" hold  - Seated Flexion Stretch  - 1 x daily - 7 x weekly - 1 sets - 1 reps - 30\" hold  - Seated Hamstring Stretch  - 1 x daily - 7 x weekly - 1 sets - 1 reps - 30\" hold  - Seated Pelvic Tilt  - 1 x daily - 7 x weekly - 1 sets - 10 reps

## 2025-01-08 NOTE — PROGRESS NOTES
Date of Service: 1/8/2025  Dx: Spondylolisthesis of lumbar region (M43.16), Sacroiliac inflammation (HCC) (M46.1)              Insurance: MEDICARE PART A&B  Insurance Limits: N/A  Visit #: 2  Authorized # of Visits: 8 recommended  POC/Auth Expiration: N/A  Date of Last PN: 12/18/24 (Visit #1)  Authorizing Physician/Provider: Kirill Dave MD visit: None Scheduled  Fall Risk: Standard         Precautions: None            Subjective: The patient reports that she has a new primary insurance company and will be going through Medicare as her primary insurance company. The patient reports that her back \"has been a mess, but most likely because of me. I tend to carry a lot bags with my computer and charts in them.\" She reports that she has tried to modify the weight of her back. She reports that when she goes to set her bag down, it will cause her back to go into spasm. And then we she stands up, it gets worse and she is unable to stand straight and walk.     The patient reports that she is still only getting \"maybe 2-3 hours of sleep a night.\" She reports that she no longer has prescription pain meds from Dr. Tamez. She reports that Advil doesn't make any impact on her pain. She reports that she shouldn't be taking ibuprofen with her other medications she's taking.     Objective:     Pain/Symptom Presentation:   Pain at rest: 5-6/10 (no change post-tx)  Pain at worst: 8-9/10    The patient is with pain lying on her right side.     HEP:   Access Code: [email or web address]  URL: [email or web address]  Date: 01/08/2025  Prepared by: Leola Cook    Exercises  - Supine Posterior Pelvic Tilt  - 1 x daily - 7 x weekly - 2 sets - 10 reps - 2-3\" hold  - Hooklying Single Knee to Chest Stretch  - 1 x daily - 7 x weekly - 1 reps - 30\" hold  - Seated Flexion Stretch  - 1 x daily - 7 x weekly - 1 sets - 1 reps - 30\" hold  - Seated Hamstring Stretch  - 1 x daily - 7 x weekly - 1 sets - 1 reps - 30\" hold  - Seated Pelvic Tilt   - 1 x daily - 7 x weekly - 1 sets - 10 reps    Treatment:    Therapeutic Exercise: x 20min  Prone quad stretch: x 30\" (B)   Seated scap retraction: 2 x 10 x 3\" (B)   DKC with SB: 1 x 10   SKC: x 30\" (B)   DKC: attempted but d/c due to pain   Seated lumbar flexion stretch: x 30\"    Seated hamstring stretch: x 30\" (B)   Seated ant/post pelvic tilt: 1 x 10 - instruction as self-mobl  Seated lumbar flex with thoracic rotation: 1 x 5 (B) - limited mobility   HEP update: Reviewed new HEP handout with new exercises and progressions, provided verbal and written instructions/cueing for proper technique and common errors/compensations as needed. Reviewed the importance of compliance with home exercise program to facilitate recovery and meet long-term goals.      Neuromuscular Re-education: x 2min  Hooklying PPT: 1 x 20 x 3\"     Manual Therapy: x 18min  Soft tissue mobilization: lumbar paraspinals, glutes, thoracic paraspinals  Lumbar PA accessory joint mobs - L1-L5: Grade 2, 4 x 10\" each   Thoracic PA accessory joint mobs -T1-T12: Grade 2, 2 x 10\" each - mildly TTP    Provider Interactions With Patient:   The patient asked about mattress firmness and was advised to try both a firm and a soft mattress and determine which one allows for better sleep.   She asks about a possible seat cushion which she was told she can try, or may consider a lumbar support pillow when sitting at her desk.   Verbally confirmed the patient's next appt date and time to ensure consistency with physical therapy attendance.     Assessment: Margarita is still with moderate to significant pain complaints that limits her tolerance to work activities, household chores, and even sleeping. She is with pain over the lateral hip when lying on her right side, which may suggest possible bursitis of the hip. The patient was taken through additional mobility and stretching exercises this date, focusing on flexion preference positions. The patient was educated on  seated ant/post pelvic tilt to make sit to stand transitions a bit easier at work. The patient reported no improvement in pain and symptom management or mobility by the end of today's session. We will continue to progress the patient as able but will monitor response to treatment interventions to determine if additional progress can be made.     Goals:   Short-Term Goals:  Patient will improve low back and hip mobility to allow for pain-free forward bending to reach for and  objects up from the floor and facilitate completion of household chores. Timeframe: 6 visits.  Patient will improve hip and thoracic spine rotational mobility to facilitate pain-free with normalized mobility for multi-segmental rotation movement pattern to facilitate twisting motions. Timeframe: 6-8 visits.  Long-Term Goals:  Patient will improve low back mobility and postural endurance to maintain proper posture with neutral lumbar spine and pelvic position while sitting to facilitate 2-3 hours of pain-free desk work for occupational activities. Timeframe: 8-10 visits.  The patient will improve low back pain to facilitate sleeping > 4 hours for improved rest. Timeframe: 10-12 visits.  Patient will improve low back and hip mobility to facilitate erect standing and walking after prolonged sitting or upon waking in the morning. Timeframe: 10-12 visits.  Patient will improve low back pain and core endurance to facilitate walking distances of 2 miles daily to facilitate household ambulation, and community ambulation,community integration Timeframe: 12 visits.  Patient will demonstrates safe and proper lifting mechanics with lifting objects of 30lbs from a lower height to facilitate household chores and occupational activities. Timeframe: 16 visits.  Patient will improve Modified Oswestry Low Back Pain Disability Questionnaire score by 10 points or 10% to indicate a true change in improved function for ADL's and restoring PLF. Timeframe: 16  visits.    Plan: Follow up on tolerance to updated HEP. Inquire about sit to stand transfers and if seated ant/post pelvic tilt helped with pain during transitions.       Charges: MT x 1, Therex x 2         Total Timed Treatment: 40min    Total Treatment Time: 40min

## 2025-01-09 ENCOUNTER — TELEPHONE (OUTPATIENT)
Dept: PHYSICAL THERAPY | Facility: HOSPITAL | Age: 65
End: 2025-01-09

## 2025-01-10 ENCOUNTER — APPOINTMENT (OUTPATIENT)
Dept: PHYSICAL THERAPY | Age: 65
End: 2025-01-10
Attending: NEUROLOGICAL SURGERY
Payer: MEDICARE

## 2025-01-15 ENCOUNTER — OFFICE VISIT (OUTPATIENT)
Dept: PHYSICAL THERAPY | Age: 65
End: 2025-01-15
Attending: NEUROLOGICAL SURGERY
Payer: MEDICARE

## 2025-01-15 PROCEDURE — 97140 MANUAL THERAPY 1/> REGIONS: CPT

## 2025-01-15 PROCEDURE — 97110 THERAPEUTIC EXERCISES: CPT

## 2025-01-15 NOTE — PROGRESS NOTES
Date of Service: 1/15/2025  Dx: Spondylolisthesis of lumbar region (M43.16), Sacroiliac inflammation (HCC) (M46.1)              Insurance: MEDICARE PART A&B  Insurance Limits: N/A  Visit #: 3  Authorized # of Visits: 8 recommended  POC/Auth Expiration: N/A  Date of Last PN: 12/18/24 (Visit #1)  Authorizing Physician/Provider: Kirill Dave MD visit: end of February  Fall Risk: Standard         Precautions: None            Subjective: The patient reports that she has been \"trying to do the stretches.\" She reports that the hamstring stretch seems to help a bit. The patient reports that she is \"getting maybe 4 hours, maybe.\" She noted some issues with managing the gas and brake pedal starting this past week. She reports that she is still getting cramps over the front of her legs. She reports that is what will prevent her from sleeping.     The patient reports that she tried using the pelvic tilt exercise before standing after sitting for a while but doesn't feel that it's helped make the transitions any easier.     Objective:     Pain/Symptom Presentation:   Pain at rest: 5/10   Pain at worst: 7-8/10 while she was driving  Resting pain post-tx: 7/10    Palpation: Patient is with TTP to gentle/light PA pressure throughout thoracic and lumbar spine.     HEP:   Access Code: [email or web address]  URL: [email or web address]  Date: 01/15/2025  Prepared by: Leola Cook    Exercises  - Supine Posterior Pelvic Tilt  - 1 x daily - 7 x weekly - 2 sets - 10 reps - 2-3\" hold  - Hooklying Single Knee to Chest Stretch  - 1 x daily - 7 x weekly - 1 reps - 30\" hold  - Seated Flexion Stretch  - 1 x daily - 7 x weekly - 1 sets - 1 reps - 30\" hold  - Seated Hamstring Stretch  - 1 x daily - 7 x weekly - 1 sets - 1 reps - 30\" hold  - Supine 90/90 Sciatic Nerve Glide with Knee Flexion/Extension  - 1 x daily - 7 x weekly - 2 sets - 10 reps  - Supine Gluteal Sets  - 1 x daily - 7 x weekly - 2 sets - 10 reps - 2-3\"  hold    Treatment:    Therapeutic Exercise: x 20min  Manual quad stretch (prone): 2 x 30\" (B)   Manual hamstring stretch (supine): 2 x 30\" (B)   Manual hip flexor stretch off EOB: x 30\" (B)   SKC: x 30\" (B)   DKC with SB: 1 x 15  Seated scap retraction: 2 x 10 x 3\" (B)   Seated lumbar flexion stretch: x 30\"    Seated hamstring stretch: x 30\" (B)   HEP update: Reviewed new HEP handout with new exercises and progressions, provided verbal and written instructions/cueing for proper technique and common errors/compensations as needed. Reviewed the importance of compliance with home exercise program to facilitate recovery and meet long-term goals.      Neuromuscular Re-education: x 6min  Supine sciatic nerve flossing (knee/ankle): 2 x 10   Hooklying PPT: 1 x 20 x 3\"   Supine glute set: 1 x 20 x 3\"     Manual Therapy: x 15min  Soft tissue mobilization: lumbar paraspinals, glutes, thoracic paraspinals  Lumbar PA accessory joint mobs - L1-L5: Grade 2, 4 x 10\" each   Thoracic PA accessory joint mobs -T1-T12: Grade 2, 2 x 10\" each - TTP    Provider Interactions With Patient:   Added therapeutic exercises as documented, with cueing provided throughout performance to ensure correct technique during exercise.  Provided patient with a written copy of exercise instruction which was also documented in patient's electronic medical chart. Educated the patient on the need for consistency with HEP to achieve the mutually established goals.  Verbally confirmed the patient's next appt date and time to ensure consistency with physical therapy attendance.     Assessment: Margarita reports new onset of radicular symptoms in her legs while driving, which may suggest some tension deficits in her sciatic nerve. We went through sciatic nerve flossing this date and included that in her HEP. The patient was still with TTP and \"soreness throughout her back and legs\" during her appt today and notes pain over the sacral area when lying supine on  treatment table. The patient reported increased pain ratings by the time she left her appt. The patient was advised that we will have to monitor her response to PT treatment closely. If her symptoms continue to be aggravated or if progress is not made in the next couple of appts, we would recommend she follow back up with her MD for other treatment options.     Goals:   Short-Term Goals:  Patient will improve low back and hip mobility to allow for pain-free forward bending to reach for and  objects up from the floor and facilitate completion of household chores. Timeframe: 6 visits.  Patient will improve hip and thoracic spine rotational mobility to facilitate pain-free with normalized mobility for multi-segmental rotation movement pattern to facilitate twisting motions. Timeframe: 6-8 visits.  Long-Term Goals:  Patient will improve low back mobility and postural endurance to maintain proper posture with neutral lumbar spine and pelvic position while sitting to facilitate 2-3 hours of pain-free desk work for occupational activities. Timeframe: 8-10 visits.  The patient will improve low back pain to facilitate sleeping > 4 hours for improved rest. Timeframe: 10-12 visits.  Patient will improve low back and hip mobility to facilitate erect standing and walking after prolonged sitting or upon waking in the morning. Timeframe: 10-12 visits.  Patient will improve low back pain and core endurance to facilitate walking distances of 2 miles daily to facilitate household ambulation, and community ambulation,community integration Timeframe: 12 visits.  Patient will demonstrates safe and proper lifting mechanics with lifting objects of 30lbs from a lower height to facilitate household chores and occupational activities. Timeframe: 16 visits.  Patient will improve Modified Oswestry Low Back Pain Disability Questionnaire score by 10 points or 10% to indicate a true change in improved function for ADL's and restoring PLF.  Timeframe: 16 visits.    Plan: Follow up on tolerance to updated HEP. Follow up on tolerance to driving with introduction of sciatic nerve flossing.       Charges: MT x 1, Therex x 2         Total Timed Treatment: 41min    Total Treatment Time: 41min

## 2025-01-15 NOTE — PATIENT INSTRUCTIONS
Thank you for coming to your physical therapy appt! During your appt today you were issued a home exercise program with a printed handout from [employer]. Your home exercise program can also be accessed using the information below. The selected exercises are meant to supplement the treatment you received and reinforce your progress made in physical therapy. Please complete these exercises as instructed by your physical therapist. It is important to stay consistent with your exercises to maximize your recovery!       Access Code: [email or web address]  URL: [email or web address]  Date: 01/15/2025  Prepared by: Leola Cook    Exercises  - Supine Posterior Pelvic Tilt  - 1 x daily - 7 x weekly - 2 sets - 10 reps - 2-3\" hold  - Hooklying Single Knee to Chest Stretch  - 1 x daily - 7 x weekly - 1 reps - 30\" hold  - Seated Flexion Stretch  - 1 x daily - 7 x weekly - 1 sets - 1 reps - 30\" hold  - Seated Hamstring Stretch  - 1 x daily - 7 x weekly - 1 sets - 1 reps - 30\" hold  - Supine 90/90 Sciatic Nerve Glide with Knee Flexion/Extension  - 1 x daily - 7 x weekly - 2 sets - 10 reps  - Supine Gluteal Sets  - 1 x daily - 7 x weekly - 2 sets - 10 reps - 2-3\" hold

## 2025-01-17 ENCOUNTER — APPOINTMENT (OUTPATIENT)
Dept: PHYSICAL THERAPY | Age: 65
End: 2025-01-17
Attending: NEUROLOGICAL SURGERY
Payer: MEDICARE

## 2025-01-22 ENCOUNTER — APPOINTMENT (OUTPATIENT)
Dept: PHYSICAL THERAPY | Age: 65
End: 2025-01-22
Attending: NEUROLOGICAL SURGERY
Payer: MEDICARE

## 2025-01-22 ENCOUNTER — TELEPHONE (OUTPATIENT)
Dept: PHYSICAL THERAPY | Facility: HOSPITAL | Age: 65
End: 2025-01-22

## 2025-01-24 ENCOUNTER — TELEPHONE (OUTPATIENT)
Dept: PHYSICAL THERAPY | Age: 65
End: 2025-01-24

## 2025-01-24 ENCOUNTER — OFFICE VISIT (OUTPATIENT)
Dept: PHYSICAL THERAPY | Age: 65
End: 2025-01-24
Attending: NEUROLOGICAL SURGERY
Payer: MEDICARE

## 2025-01-24 PROCEDURE — 97140 MANUAL THERAPY 1/> REGIONS: CPT

## 2025-01-24 PROCEDURE — 97110 THERAPEUTIC EXERCISES: CPT

## 2025-01-24 NOTE — PROGRESS NOTES
Date of Service: 2025  Dx: Spondylolisthesis of lumbar region (M43.16), Sacroiliac inflammation (HCC) (M46.1)              Insurance: MEDICARE PART A&B  Insurance Limits: N/A  Visit #: 4  Authorized # of Visits: 8 recommended  POC/Auth Expiration: N/A  Date of Last PN: 24 (Visit #1)  Authorizing Physician/Provider: Kirill Dave MD visit: end   Fall Risk: Standard         Precautions: None            Subjective: The patient reports that the exercises seem to be helping. She reports that she hasn't had any pain with driving since doing the nerve flossing exercise. She reports that she is trying to lose weight and get healthier. She reports that she got sick from somebody at work.    The patient reports that after her last appt, she had to drive to [place] for a . She tried to get out and walk a couple of times, but that \"didn't help\" the pain she had after her last appt.     The patient reports that she started to Advil and noticed some spotted bruising over her forearms, no explanation otherwise, so she stopped taking that.     Objective:     Pain/Symptom Presentation:   Pain at rest: 3/10   Pain at worst: 6-7/10     Palpation: Patient is with TTP to gentle/light PA pressure throughout thoracic and lumbar spine.     HEP:   Access Code: [email or web address]  URL: [email or web address]  Date: 01/15/2025  Prepared by: Leola Cook    Exercises  - Supine Posterior Pelvic Tilt  - 1 x daily - 7 x weekly - 2 sets - 10 reps - 2-3\" hold  - Hooklying Single Knee to Chest Stretch  - 1 x daily - 7 x weekly - 1 reps - 30\" hold  - Seated Flexion Stretch  - 1 x daily - 7 x weekly - 1 sets - 1 reps - 30\" hold  - Seated Hamstring Stretch  - 1 x daily - 7 x weekly - 1 sets - 1 reps - 30\" hold  - Supine 90/90 Sciatic Nerve Glide with Knee Flexion/Extension  - 1 x daily - 7 x weekly - 2 sets - 10 reps  - Supine Gluteal Sets  - 1 x daily - 7 x weekly - 2 sets - 10 reps - 2-3\"  hold    Treatment:    Therapeutic Exercise: x 20min  Manual quad stretch (prone): 2 x 30\" (B)   Manual hamstring stretch (supine): 2 x 30\" (B)   Manual hip flexor stretch off EOB: x 30\" (B)   SKC: 2 x 30\" (B)   DKC with SB: x 20 with GSB  Seated scap retraction: 2 x 10 x 3\" (B)   Seated lumbar flexion stretch: x 30\"    Seated hamstring stretch: x 30\" (B)     Neuromuscular Re-education: x 5min  Hooklying PPT: 1 x 20 x 3\"   Supine glute set: 1 x 20 x 3\"     Manual Therapy: x 15min  Soft tissue mobilization (prone with pillow under stomach): lumbar paraspinals, glutes, thoracic paraspinals  Lumbar PA accessory joint mobs - L1-L5: Grade 2, 4 x 10\" each - TTP    Assessment: Margarita is with improved radicular symptoms and is with improved tolerance to driving activities after adding sciatic nerve flossing to her exercise regimen. The patient reports compliance with her HEP. The patient is still with remaining TTP over the lumbar spine to PA pressure as well as throughout low back and glute musculature, with increased resting muscle tone noted. Verbal and manual cueing was provided on proper performance of the prescribed exercises. The patient would benefit from continued PT to see if additional progress in pain and symptom management and function can be made.     Goals:   Short-Term Goals:  Patient will improve low back and hip mobility to allow for pain-free forward bending to reach for and  objects up from the floor and facilitate completion of household chores. Timeframe: 6 visits.  Patient will improve hip and thoracic spine rotational mobility to facilitate pain-free with normalized mobility for multi-segmental rotation movement pattern to facilitate twisting motions. Timeframe: 6-8 visits.  Long-Term Goals:  Patient will improve low back mobility and postural endurance to maintain proper posture with neutral lumbar spine and pelvic position while sitting to facilitate 2-3 hours of pain-free desk work for  occupational activities. Timeframe: 8-10 visits.  The patient will improve low back pain to facilitate sleeping > 4 hours for improved rest. Timeframe: 10-12 visits.  Patient will improve low back and hip mobility to facilitate erect standing and walking after prolonged sitting or upon waking in the morning. Timeframe: 10-12 visits.  Patient will improve low back pain and core endurance to facilitate walking distances of 2 miles daily to facilitate household ambulation, and community ambulation,community integration Timeframe: 12 visits.  Patient will demonstrates safe and proper lifting mechanics with lifting objects of 30lbs from a lower height to facilitate household chores and occupational activities. Timeframe: 16 visits.  Patient will improve Modified Oswestry Low Back Pain Disability Questionnaire score by 10 points or 10% to indicate a true change in improved function for ADL's and restoring PLF. Timeframe: 16 visits.    Plan: Patient was advised to schedule 4 additional follow-up appts per MD recommendations.        Charges: MT x 1, Therex x 2         Total Timed Treatment: 41min    Total Treatment Time: 41min

## 2025-01-27 ENCOUNTER — TELEPHONE (OUTPATIENT)
Dept: PHYSICAL THERAPY | Age: 65
End: 2025-01-27

## 2025-02-05 ENCOUNTER — APPOINTMENT (OUTPATIENT)
Dept: PHYSICAL THERAPY | Age: 65
End: 2025-02-05
Attending: NEUROLOGICAL SURGERY
Payer: MEDICARE

## 2025-02-06 ENCOUNTER — OFFICE VISIT (OUTPATIENT)
Dept: FAMILY MEDICINE CLINIC | Facility: CLINIC | Age: 65
End: 2025-02-06
Payer: MEDICARE

## 2025-02-06 VITALS
HEART RATE: 79 BPM | OXYGEN SATURATION: 98 % | WEIGHT: 250 LBS | HEIGHT: 68 IN | SYSTOLIC BLOOD PRESSURE: 142 MMHG | DIASTOLIC BLOOD PRESSURE: 94 MMHG | RESPIRATION RATE: 20 BRPM | BODY MASS INDEX: 37.89 KG/M2 | TEMPERATURE: 100 F

## 2025-02-06 DIAGNOSIS — J11.1 INFLUENZA-LIKE ILLNESS: Primary | ICD-10-CM

## 2025-02-06 DIAGNOSIS — J02.9 SORE THROAT: ICD-10-CM

## 2025-02-06 PROCEDURE — 99213 OFFICE O/P EST LOW 20 MIN: CPT | Performed by: NURSE PRACTITIONER

## 2025-02-06 PROCEDURE — 87637 SARSCOV2&INF A&B&RSV AMP PRB: CPT | Performed by: NURSE PRACTITIONER

## 2025-02-06 PROCEDURE — 87880 STREP A ASSAY W/OPTIC: CPT | Performed by: NURSE PRACTITIONER

## 2025-02-06 RX ORDER — BENZONATATE 200 MG/1
200 CAPSULE ORAL 3 TIMES DAILY PRN
Qty: 20 CAPSULE | Refills: 0 | Status: SHIPPED | OUTPATIENT
Start: 2025-02-06

## 2025-02-06 RX ORDER — OSELTAMIVIR PHOSPHATE 75 MG/1
75 CAPSULE ORAL 2 TIMES DAILY
Qty: 10 CAPSULE | Refills: 0 | Status: SHIPPED | OUTPATIENT
Start: 2025-02-06 | End: 2025-02-11

## 2025-02-06 NOTE — PATIENT INSTRUCTIONS
1. Rest. Drink plenty of fluids.  2. Supportive care as discussed. Benzonatate as prescribed for cough. Tamiflu as prescribed. (Stop tamiflu if viral panel is negative for influenza A and influenza B).  3. Salt water gargles three times daily  4. Use humidifier at home when possible.  5. The rapid strep test was negative today. We will send a throat culture to lab and call you with results in 3-4 days.  6. Covid-19/FLU/RSV testing sent to lab.  Self quarantine at this time. If covid-19 test is positive then please follow the listed guidelines below:  Home isolation until:  Your symptoms are improving AND  You are fever free for 24 hours without using fever reducing medications  Resume normal activities, and use added prevention strategies over the next five days.  Clean your hands often  wear a well-fitting mask when around others  keep a distance from others    7. Follow up with PMD in 4-5 days for re-eval. Go to the emergency department immediately if symptoms worsen, change, you develop chest discomfort, wheezing, shortness of breath, or if you have any concerns.
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
## 2025-02-07 ENCOUNTER — APPOINTMENT (OUTPATIENT)
Dept: PHYSICAL THERAPY | Age: 65
End: 2025-02-07
Attending: NEUROLOGICAL SURGERY
Payer: MEDICARE

## 2025-02-08 ENCOUNTER — TELEPHONE (OUTPATIENT)
Dept: FAMILY MEDICINE CLINIC | Facility: CLINIC | Age: 65
End: 2025-02-08

## 2025-02-08 DIAGNOSIS — U07.1 COVID: Primary | ICD-10-CM

## 2025-02-08 LAB
FLUAV + FLUBV RNA SPEC NAA+PROBE: NOT DETECTED
FLUAV + FLUBV RNA SPEC NAA+PROBE: NOT DETECTED
RSV RNA SPEC NAA+PROBE: NOT DETECTED
SARS-COV-2 RNA RESP QL NAA+PROBE: DETECTED

## 2025-02-08 NOTE — TELEPHONE ENCOUNTER
Discussed with patient results and to stop tamiflu. Discussed paxlovid and patient would like to start. Informed pt because she is on atorvastatin to decrease dose per dhara to 10 mg and 3 days after completing paxlovid to resume normal dose. Informed pt if she develops any worsening sx's such as difficulty breathing, shortness of breath to go to ER or call 911. Pt verbalized understanding. Script sent to pharmacy.

## 2025-02-13 ENCOUNTER — NURSE TRIAGE (OUTPATIENT)
Dept: FAMILY MEDICINE CLINIC | Facility: CLINIC | Age: 65
End: 2025-02-13

## 2025-02-13 NOTE — TELEPHONE ENCOUNTER
Was seen at urgent care in [place] on 2-6-25 positive for COVID. Was prescribed tamiflu which she had a reaction to so had to stop.    Still struggling to get deep breath in, did return today. Pulse Sat 93-95%, doing albuterol treatments every 4-6 hours which are helping.  Reason for Disposition   Patient sounds very sick or weak to the triager    Answer Assessment - Initial Assessment Questions  1. RESPIRATORY STATUS: \"Describe your breathing?\" (e.g., wheezing, shortness of breath, unable to speak, severe coughing)       Chest tightness  2. ONSET: \"When did this breathing problem begin?\"       During the night  3. PATTERN \"Does the difficult breathing come and go, or has it been constant since it started?\"       Loosens with nebulizer treatment  4. SEVERITY: \"How bad is your breathing?\" (e.g., mild, moderate, severe)     - MILD: No SOB at rest, mild SOB with walking, speaks normally in sentences, can lie down, no retractions, pulse < 100.     - MODERATE: SOB at rest, SOB with minimal exertion and prefers to sit, cannot lie down flat, speaks in phrases, mild retractions, audible wheezing, pulse 100-120.     - SEVERE: Very SOB at rest, speaks in single words, struggling to breathe, sitting hunched forward, retractions, pulse > 120       Moderate   5. RECURRENT SYMPTOM: \"Have you had difficulty breathing before?\" If Yes, ask: \"When was the last time?\" and \"What happened that time?\"       No  6. CARDIAC HISTORY: \"Do you have any history of heart disease?\" (e.g., heart attack, angina, bypass surgery, angioplasty)       Heart attack in past with stent placement 5-8 years ago  7. LUNG HISTORY: \"Do you have any history of lung disease?\"  (e.g., pulmonary embolus, asthma, emphysema)      Asthma  8. CAUSE: \"What do you think is causing the breathing problem?\"       Positive COVID 2-6-25  9. OTHER SYMPTOMS: \"Do you have any other symptoms? (e.g., dizziness, runny nose, cough, chest pain, fever)      Cough, dizziness,  10.  O2 SATURATION MONITOR:  \"Do you use an oxygen saturation monitor (pulse oximeter) at home?\" If Yes, ask: \"What is your reading (oxygen level) today?\" \"What is your usual oxygen saturation reading?\" (e.g., 95%)        93-95%  11. PREGNANCY: \"Is there any chance you are pregnant?\" \"When was your last menstrual period?\"        NA  12. TRAVEL: \"Have you traveled out of the country in the last month?\" (e.g., travel history, exposures)        NA    Answer Assessment - Initial Assessment Questions  1. COVID-19 DIAGNOSIS: \"How do you know that you have COVID?\" (e.g., positive lab test or self-test, diagnosed by doctor or NP/PA, symptoms after exposure).      Urgent care 2-6-25  2. COVID-19 EXPOSURE: \"Was there any known exposure to COVID before the symptoms began?\" CDC Definition of close contact: within 6 feet (2 meters) for a total of 15 minutes or more over a 24-hour period.       Yes  3. ONSET: \"When did the COVID-19 symptoms start?\"       2-6-25  4. WORST SYMPTOM: \"What is your worst symptom?\" (e.g., cough, fever, shortness of breath, muscle aches)      Taking in deep breath  5. COUGH: \"Do you have a cough?\" If Yes, ask: \"How bad is the cough?\"        Mild cough  6. FEVER: \"Do you have a fever?\" If Yes, ask: \"What is your temperature, how was it measured, and when did it start?\"     Yes, since resolved  7. RESPIRATORY STATUS: \"Describe your breathing?\" (e.g., normal; shortness of breath, wheezing, unable to speak)       Shortness of breath  8. BETTER-SAME-WORSE: \"Are you getting better, staying the same or getting worse compared to yesterday?\"  If getting worse, ask, \"In what way?\"      Possibly getting worse, feels returning to work made it worse  9. OTHER SYMPTOMS: \"Do you have any other symptoms?\"  (e.g., chills, fatigue, headache, loss of smell or taste, muscle pain, sore throat)      Fatigue, headache off/on, loss of smell/taste, muscle pain and sore throat  10. HIGH RISK DISEASE: \"Do you have any chronic medical  problems?\" (e.g., asthma, heart or lung disease, weak immune system, obesity, etc.)        asthma  11. VACCINE: \"Have you had the COVID-19 vaccine?\" If Yes, ask: \"Which one, how many shots, when did you get it?\"        Yes  12. PREGNANCY: \"Is there any chance you are pregnant?\" \"When was your last menstrual period?\"        No  13. O2 SATURATION MONITOR:  \"Do you use an oxygen saturation monitor (pulse oximeter) at home?\" If Yes, ask \"What is your reading (oxygen level) today?\" \"What is your usual oxygen saturation reading?\" (e.g., 95%)        93-95%    Protocols used: Breathing Difficulty-A-OH, Coronavirus (COVID-19) Diagnosed or Apnaxwizn-K-NF    Patient advised should go to immediate care/ER with worsening breathing issues, declined and agrees to go to ER during the night if needed prior to appointment. Doing nebulizer treatments which are helping and using humidifier.Patient feels she returned to work too quickly which has exacerbated symptoms.    Future Appointments   Date Time Provider Department Center   2/14/2025 10:00 AM ANABELLE Tamez, DO [facility] [place]   2/19/2025  7:15 AM Leola Cook PT [facility] [place]   2/21/2025  8:00 AM Leola Cook PT [facility] [place]   2/26/2025  7:15 AM Leola Cook PT [facility] [place]   2/28/2025  8:00 AM Leola Cook PT [facility] [place]

## 2025-02-13 NOTE — TELEPHONE ENCOUNTER
Called in and scheduled appt. With Dr. Tamez tomorrow. She is still sick from being covid positive on 2-5-25 but also stating she is having a hard time breathing.

## 2025-02-14 ENCOUNTER — OFFICE VISIT (OUTPATIENT)
Dept: FAMILY MEDICINE CLINIC | Facility: CLINIC | Age: 65
End: 2025-02-14
Payer: MEDICARE

## 2025-02-14 ENCOUNTER — MOBILE ENCOUNTER (OUTPATIENT)
Dept: FAMILY MEDICINE CLINIC | Facility: CLINIC | Age: 65
End: 2025-02-14

## 2025-02-14 VITALS
BODY MASS INDEX: 38 KG/M2 | TEMPERATURE: 98 F | SYSTOLIC BLOOD PRESSURE: 136 MMHG | RESPIRATION RATE: 20 BRPM | HEART RATE: 60 BPM | OXYGEN SATURATION: 98 % | DIASTOLIC BLOOD PRESSURE: 72 MMHG | WEIGHT: 250 LBS

## 2025-02-14 DIAGNOSIS — U07.1 COVID: ICD-10-CM

## 2025-02-14 DIAGNOSIS — J01.00 SUBACUTE MAXILLARY SINUSITIS: Primary | ICD-10-CM

## 2025-02-14 PROCEDURE — 99214 OFFICE O/P EST MOD 30 MIN: CPT | Performed by: FAMILY MEDICINE
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
## 2025-02-19 ENCOUNTER — OFFICE VISIT (OUTPATIENT)
Dept: PHYSICAL THERAPY | Age: 65
End: 2025-02-19
Attending: NEUROLOGICAL SURGERY
Payer: MEDICARE

## 2025-02-19 PROCEDURE — 97112 NEUROMUSCULAR REEDUCATION: CPT

## 2025-02-19 PROCEDURE — 97110 THERAPEUTIC EXERCISES: CPT

## 2025-02-19 PROCEDURE — 97140 MANUAL THERAPY 1/> REGIONS: CPT

## 2025-02-19 NOTE — PROGRESS NOTES
PHYSICAL THERAPY PROGRESS SUMMARY     Date of Service: 2/19/2025  Dx: Spondylolisthesis of lumbar region (M43.16), Sacroiliac inflammation (HCC) (M46.1)              Insurance: MEDICARE PART A&B  Insurance Limits: N/A  Visit #: 5  Authorized # of Visits: 8 recommended  POC/Auth Expiration: N/A  Date of Last PN: 2/19/24 (Visit #5)  Authorizing Physician/Provider: Kirill Dave MD visit: March   Fall Risk: Standard         Precautions: None            Subjective: The patient reports that she was given an antibiotic because she tested positive for COVID and wasn't getting better. She reports that she was having issues breathing. The patient reports that she didn't do anything while she was sick but she has started back up. She reports that she is getting about 5 hours of sleep a night with less pain at night.     The patient reports that she had to change her doctor's appt to the end of March because she ended up having to postpone a vacation she had planned.    The patient reports that she lost 12lbs because of her sickness a loss of appetite.  She reports increased pain ratings at worst due to increased coughing.     Objective:     Pain/Symptom Presentation:   Pain at rest: 4-5/10   Pain at worst: 8/10     Activity Measures:  Sleeping: Mild Activity Limitation: Patient is able to sleep 5 hours a night, though patient admits this may be affected by recent illness.   Sitting: Mild Activity Limitation: Patient is able to sit up to 1-2 hours before disruption due to low back pain.   Bending Forward: Mild Activity Limitation: The patient no longer needs UE assist to stand erect.  Standing/Walking After Prolonged Sitting: Moderate Activity Limitation: Patient's stiffness subsides within 30 minutes (no change).  Standing: Mild Activity Limitation: Patient is able to stand up to 1 hour before disruption due to low back pain (no change).   Walking: Severe Activity Limitation: Patient is able to walk up to 15-20 minutes before  disruption due to low back pain (no change).  Pushing: Mild Activity Limitation: Patient is able to push objects up to 10lbs.   Pulling: Mild Activity Limitation: Patient is able to pull objects up to 10lbs.   Lifting Light Objects: Moderate Activity Limitation: Patient is able to lift light objects up to 10lbs.  Lifting Heavy Objects: Severe Activity Limitation: Patient is able to lift object up to 10lbs.    Outcomes Measure(s):   48% disability (improved from 54% disability)    ROM:          Hip Motion PROM AROM    Right Left Right Left   Hip Flexion 100 100 N/A N/A   Hip ER (at 90deg hip flex) 40 40 N/A N/A   Hip IR (at 90deg hip flex) 25 18 N/A N/A   *indicates activity was associated with pain    Special Tests:   Low Back Special Tests:  Forward spinal flexion: (+)  Spinal extension: (+)    HEP:   Access Code: [email or web address]  URL: [email or web address]  Date: 01/15/2025  Prepared by: Leola Cook    Exercises  - Supine Posterior Pelvic Tilt  - 1 x daily - 7 x weekly - 2 sets - 10 reps - 2-3\" hold  - Hooklying Single Knee to Chest Stretch  - 1 x daily - 7 x weekly - 1 reps - 30\" hold  - Seated Flexion Stretch  - 1 x daily - 7 x weekly - 1 sets - 1 reps - 30\" hold  - Seated Hamstring Stretch  - 1 x daily - 7 x weekly - 1 sets - 1 reps - 30\" hold  - Supine 90/90 Sciatic Nerve Glide with Knee Flexion/Extension  - 1 x daily - 7 x weekly - 2 sets - 10 reps  - Supine Gluteal Sets  - 1 x daily - 7 x weekly - 2 sets - 10 reps - 2-3\" hold    Treatment:    Therapeutic Exercise: x 15min  Discussed the patient's remaining symptoms, functional limitations, and concerns to establish updated POC.   Manual quad stretch (prone): 2 x 30\" (B)   Manual hamstring stretch (supine): 2 x 30\" (B)   S/L clamshell: x 20 (B), red theraband   S/L hip ABD: 2 x 10 (B) - harder on the right side    Neuromuscular Re-education: x 8min  Hooklying PPT: 1 x 20 x 3\"   Supine 90-90 smita hold: 2 x 20\"   Seated eccentric lean back: 1 x  10    Manual Therapy: x 17min  Soft tissue mobilization (prone with pillow under stomach): lumbar paraspinals, glutes, thoracic paraspinals    Assessment: Margarita is a 64 year old female that presented to physical therapy evaluation with complaints of low back pain and right-sided radicular symptoms and strength deficits. The patient was diagnosed with an L4-L5 spondylolisthesis. Margarita has been seen for 5 sessions since her initial evaluation on 12/18/24. The patient was recently gone from PT for about a month due to illness and reports irregular compliance with her HEP during that time, but has resumed compliance since she has been feeling better. At her last appt, the patient reported improved radicular symptoms when driving with introduction of sciatic nerve flossing, though she admits she hasn't been doing much driving recently. The patient reports some improved tolerance to sleeping, though she admits this may be impacted by fatigue from recent illness. The patient reports some improved tolerance to bending over but is still with stiffness upon standing after prolonged sitting and upon waking in the morning and is still with similar limitations for standing, walking, and lifting, pushing, and pulling. The patient's difficulty with higher level activities in limiting her household chores, social life, and occupational activities still at this time. The patient's outcomes measures has improved mildly from 54% to 48% disability over the course of the past two months. We will continue to see the patient for four additional visits as recommended by her MD and will assess if additional progress can be made in PT over the next couple of weeks.     Goals:   Short-Term Goals:  Patient will improve low back and hip mobility to allow for pain-free forward bending to reach for and  objects up from the floor and facilitate completion of household chores. Timeframe: 8 visits. (NOT YET MET 2/19/25. Updated goal  timeframe.)  Patient will improve hip and thoracic spine rotational mobility to facilitate pain-free with normalized mobility for multi-segmental rotation movement pattern to facilitate twisting motions. Timeframe: 8 visits. (IN PROGRESS 2/19/25. The patient is with improved right hip rotational mobility, but is with remaining deficits on her left side. Updated goal timeframe.)   Long-Term Goals:  Patient will improve low back mobility and postural endurance to maintain proper posture with neutral lumbar spine and pelvic position while sitting to facilitate 2-3 hours of pain-free desk work for occupational activities. Timeframe: 12 visits. (IN PROGRESS 2/19/24. Patient is able to sit up to 2 hours before disruption due to low back pain. Updated goal timeframe.)   The patient will improve low back pain to facilitate sleeping > 4 hours for improved rest. Timeframe: 10-12 visits. (MET 2/19/25)  Patient will improve low back and hip mobility to facilitate erect standing and walking after prolonged sitting or upon waking in the morning. Timeframe: 16 visits. (NOT MET 2/19/25. Updated goal timeframe.)  Patient will improve low back pain and core endurance to facilitate walking distances of 2 miles daily to facilitate household ambulation, and community ambulation,community integration Timeframe: 20 visits. (NOT MET 2/19/25. Updated goal timeframe.)  Patient will demonstrates safe and proper lifting mechanics with lifting objects of 30lbs from a lower height to facilitate household chores and occupational activities. Timeframe: 20 visits. (NOT MET 2/19/25. Updated goal timeframe.)  Patient will improve Modified Oswestry Low Back Pain Disability Questionnaire score by 10 points or 10% to indicate a true change in improved function for ADL's and restoring PLF. Timeframe: 20 visits. (NOT MET 2/19/25. Updated goal timeframe.)    Plan: We will see the patient for another 4 sessions and will re-assess progress at that time to  determine need for continued care versus referral back to MD for other treatment options.       Charges: MT x 1, Therex x 1, NMR x 1          Total Timed Treatment: 40min    Total Treatment Time: 40min

## 2025-02-21 ENCOUNTER — APPOINTMENT (OUTPATIENT)
Dept: PHYSICAL THERAPY | Age: 65
End: 2025-02-21
Attending: NEUROLOGICAL SURGERY
Payer: MEDICARE

## 2025-02-21 ENCOUNTER — TELEPHONE (OUTPATIENT)
Dept: PHYSICAL THERAPY | Facility: HOSPITAL | Age: 65
End: 2025-02-21

## 2025-02-25 ENCOUNTER — TELEPHONE (OUTPATIENT)
Dept: FAMILY MEDICINE CLINIC | Facility: CLINIC | Age: 65
End: 2025-02-25

## 2025-02-25 RX ORDER — IPRATROPIUM BROMIDE AND ALBUTEROL SULFATE 2.5; .5 MG/3ML; MG/3ML
3 SOLUTION RESPIRATORY (INHALATION)
Qty: 120 EACH | Refills: 0 | Status: SHIPPED | OUTPATIENT
Start: 2025-02-25

## 2025-02-25 RX ORDER — PREDNISONE 20 MG/1
20 TABLET ORAL 2 TIMES DAILY
Qty: 20 TABLET | Refills: 0 | Status: SHIPPED | OUTPATIENT
Start: 2025-02-25 | End: 2025-03-07

## 2025-02-25 NOTE — TELEPHONE ENCOUNTER
Spoke with Dr. Tamez.  Okay for prednisone 20mg BID x5days, okay to refill Duoneb and can cancel 2/26/25 appt.    Left a detailed message on pt's v/m re: this

## 2025-02-25 NOTE — TELEPHONE ENCOUNTER
Margarita is calling, I made her an appt for 2/26 at 3:30 pm, but she is having shortness of breath still. If you could please call and speak with her about this thank you.

## 2025-02-25 NOTE — TELEPHONE ENCOUNTER
Spoke with pt. She scheduled an appt tomorrow afternoon for continued cough and SOB.   Had + Covid on 2/5/5. Was seen here on 2/14 for sinusitis and completed the Zithromax that was prescribed that day.   States her cough is \"looser\", but still coughing a lot and SOB at times.   She is using Duoneb q4hrs (she is requesting a refill on this too---okay to refill?).  O2 sat is 96%, sometimes goes down to 93-94% by the end of the day.    Pt asks if she should start a steroid now and be seen tomorrow? Or if she even needs to come in? Of is she should get a CXR?   Reports she is going to [place] next week and her allergies tend to bother her while there, so she doesn't want her cough to worsen while there

## 2025-02-26 ENCOUNTER — OFFICE VISIT (OUTPATIENT)
Dept: PHYSICAL THERAPY | Age: 65
End: 2025-02-26
Attending: NEUROLOGICAL SURGERY
Payer: MEDICARE

## 2025-02-26 PROCEDURE — 97140 MANUAL THERAPY 1/> REGIONS: CPT

## 2025-02-26 PROCEDURE — 97112 NEUROMUSCULAR REEDUCATION: CPT

## 2025-02-26 PROCEDURE — 97110 THERAPEUTIC EXERCISES: CPT

## 2025-02-26 NOTE — PROGRESS NOTES
Date of Service: 2/26/2025  Dx: Spondylolisthesis of lumbar region (M43.16), Sacroiliac inflammation (HCC) (M46.1)              Insurance: MEDICARE PART A&B  Insurance Limits: N/A  Visit #: 6  Authorized # of Visits: 8 recommended  POC/Auth Expiration: N/A  Date of Last PN: 2/19/24 (Visit #5)  Authorizing Physician/Provider: Kirill Dave MD visit: 3/19/25  Fall Risk: Standard         Precautions: None            Subjective: The patient reports that her back is \"okay.\" She has been coughing a lot and is going to be put on prednisone. \"And now the weather is going back the other way again, so it may affect my asthma.\" The patient reports that she is sleeping a bit more because she's been taking Robitussen, which she isn't supposed to take with blood pressure medication, but she needs it because \"I needed the rest anyway.\" The patient reports that she remains active at work.     Objective:     Pain/Symptom Presentation:   Pain at rest: 4/10   Pain at worst: 6-7/10, \"after I've been coughing all day\"    HEP:   Access Code: [email or web address]  URL: [email or web address]  Date: 02/26/2025  Prepared by: Leola Cook    Exercises  - Supine Posterior Pelvic Tilt  - 1 x daily - 7 x weekly - 2 sets - 10 reps - 2-3\" hold  - Hooklying Single Knee to Chest Stretch  - 1 x daily - 7 x weekly - 1 reps - 30\" hold  - Seated Flexion Stretch  - 1 x daily - 7 x weekly - 1 sets - 1 reps - 30\" hold  - Seated Hamstring Stretch  - 1 x daily - 7 x weekly - 1 sets - 1 reps - 30\" hold  - Clam with Resistance  - 1 x daily - 7 x weekly - 2 sets - 10 reps - red theraband  - Sidelying Hip Abduction  - 1 x daily - 7 x weekly - 2 sets - 10 reps    Treatment:    Therapeutic Exercise: x 15min  Manual quad stretch (prone): 2 x 30\" (B)   Manual hamstring stretch (supine): 2 x 30\" (B)   DKC with SB: x 20 with RSB   LTR with SB: x 10 (B) with RSB   S/L clamshell: x 20 (B), red theraband   S/L hip ABD: 2 x 10 (B)   HEP update: Reviewed new HEP  handout with new exercises and progressions, provided verbal and written instructions/cueing for proper technique and common errors/compensations as needed. Reviewed the importance of compliance with home exercise program to facilitate recovery and meet long-term goals.      Neuromuscular Re-education: x 8min  Hooklying PPT: 1 x 20 x 3\"   Supine 90-90 smita hold: 1 x 5\", 1 x 8\" - caused a pulling sensation in her low back   Seated eccentric lean back: 1 x 10     Manual Therapy: x 17min  Soft tissue mobilization (prone with pillow under stomach): lumbar paraspinals, glutes, thoracic paraspinals    Assessment: Margarita arrives today with some increased complaints of low back pain due to excessive coughing due to illness. The patient was with TTP throughout her low back this date. She had some increased difficulty with the smita 90-90 hold this date. The patient was educated on improving her strength to facilitate better control and management of her own body weight, given that is the demand placed on her daily with ADL's, household chores, and occupational activities. The patient was issued an updated HEP to include additional core and hip strengthening exercises this date. The patient was scheduled for an additional appt consistent with MD recommendations prior to next MD appt 3/19/25.    Goals:   Short-Term Goals:  Patient will improve low back and hip mobility to allow for pain-free forward bending to reach for and  objects up from the floor and facilitate completion of household chores. Timeframe: 8 visits. (NOT YET MET 2/19/25. Updated goal timeframe.)  Patient will improve hip and thoracic spine rotational mobility to facilitate pain-free with normalized mobility for multi-segmental rotation movement pattern to facilitate twisting motions. Timeframe: 8 visits. (IN PROGRESS 2/19/25. The patient is with improved right hip rotational mobility, but is with remaining deficits on her left side. Updated goal  timeframe.)   Long-Term Goals:  Patient will improve low back mobility and postural endurance to maintain proper posture with neutral lumbar spine and pelvic position while sitting to facilitate 2-3 hours of pain-free desk work for occupational activities. Timeframe: 12 visits. (IN PROGRESS 2/19/24. Patient is able to sit up to 2 hours before disruption due to low back pain. Updated goal timeframe.)   The patient will improve low back pain to facilitate sleeping > 4 hours for improved rest. Timeframe: 10-12 visits. (MET 2/19/25)  Patient will improve low back and hip mobility to facilitate erect standing and walking after prolonged sitting or upon waking in the morning. Timeframe: 16 visits. (NOT MET 2/19/25. Updated goal timeframe.)  Patient will improve low back pain and core endurance to facilitate walking distances of 2 miles daily to facilitate household ambulation, and community ambulation,community integration Timeframe: 20 visits. (NOT MET 2/19/25. Updated goal timeframe.)  Patient will demonstrates safe and proper lifting mechanics with lifting objects of 30lbs from a lower height to facilitate household chores and occupational activities. Timeframe: 20 visits. (NOT MET 2/19/25. Updated goal timeframe.)  Patient will improve Modified Oswestry Low Back Pain Disability Questionnaire score by 10 points or 10% to indicate a true change in improved function for ADL's and restoring PLF. Timeframe: 20 visits. (NOT MET 2/19/25. Updated goal timeframe.)    Plan: Continue to progress core and hip strengthening to facilitate improved tolerance to ADL's. Follow up on tolerance to updated HEP.       Charges: MT x 1, Therex x 1, NMR x 1          Total Timed Treatment: 40min    Total Treatment Time: 40min

## 2025-02-26 NOTE — PATIENT INSTRUCTIONS
Thank you for coming to your physical therapy appt! During your appt today you were issued a home exercise program with a printed handout from [employer]. Your home exercise program can also be accessed using the information below. The selected exercises are meant to supplement the treatment you received and reinforce your progress made in physical therapy. Please complete these exercises as instructed by your physical therapist. It is important to stay consistent with your exercises to maximize your recovery!       Access Code: [email or web address]  URL: [email or web address]  Date: 02/26/2025  Prepared by: Leola Cook    Exercises  - Supine Posterior Pelvic Tilt  - 1 x daily - 7 x weekly - 2 sets - 10 reps - 2-3\" hold  - Hooklying Single Knee to Chest Stretch  - 1 x daily - 7 x weekly - 1 reps - 30\" hold  - Seated Flexion Stretch  - 1 x daily - 7 x weekly - 1 sets - 1 reps - 30\" hold  - Seated Hamstring Stretch  - 1 x daily - 7 x weekly - 1 sets - 1 reps - 30\" hold  - Clam with Resistance  - 1 x daily - 7 x weekly - 2 sets - 10 reps - red theraband  - Sidelying Hip Abduction  - 1 x daily - 7 x weekly - 2 sets - 10 reps

## 2025-02-28 ENCOUNTER — OFFICE VISIT (OUTPATIENT)
Dept: PHYSICAL THERAPY | Age: 65
End: 2025-02-28
Attending: NEUROLOGICAL SURGERY
Payer: MEDICARE

## 2025-02-28 PROCEDURE — 97110 THERAPEUTIC EXERCISES: CPT

## 2025-02-28 PROCEDURE — 97140 MANUAL THERAPY 1/> REGIONS: CPT

## 2025-02-28 NOTE — PROGRESS NOTES
Date of Service: 2/26/2025  Dx: Spondylolisthesis of lumbar region (M43.16), Sacroiliac inflammation (HCC) (M46.1)              Insurance: MEDICARE PART A&B  Insurance Limits: N/A  Visit #: 6  Authorized # of Visits: 8 recommended  POC/Auth Expiration: N/A  Date of Last PN: 2/19/24 (Visit #5)  Authorizing Physician/Provider: Kirill Dave MD visit: 3/19/25  Fall Risk: Standard         Precautions: None            Subjective: The patient reports that her back is \"not so hot. I didn't life anything or do anything. I notice it since doing the standing leg kicks. It's only on the right side.\" The patient repots that she had another coughing fit. She has started prednisone.    Objective:     Pain/Symptom Presentation:   Pain at rest: 7-8/10, the patient is managing with Advil  Pain at worst: 7-8/10  Pain at rest (post-tx): 6/10     HEP:  Access Code: [email or web address]  URL: [email or web address]  Date: 02/26/2025  Prepared by: Leola Cook    Exercises  - Supine Posterior Pelvic Tilt  - 1 x daily - 7 x weekly - 2 sets - 10 reps - 2-3\" hold  - Hooklying Single Knee to Chest Stretch  - 1 x daily - 7 x weekly - 1 reps - 30\" hold  - Seated Flexion Stretch  - 1 x daily - 7 x weekly - 1 sets - 1 reps - 30\" hold  - Seated Hamstring Stretch  - 1 x daily - 7 x weekly - 1 sets - 1 reps - 30\" hold  - Clam with Resistance  - 1 x daily - 7 x weekly - 2 sets - 10 reps - red theraband  - Sidelying Hip Abduction  - 1 x daily - 7 x weekly - 2 sets - 10 reps    Treatment:    Therapeutic Exercise: x 12min  Manual quad stretch (prone): 2 x 30\" (B)   Manual hamstring stretch (supine): 2 x 30\" (B)   DKC with SB: x 20 with RSB   LTR with SB: x 10 (B) with RSB   Seated lumbar flex stretch: x 30\"   Seated hamstring stretch: x 30\" (B)     Neuromuscular Re-education: x 3min  Hooklying PPT: 1 x 20 x 3\"   Glute set: 1 x 20 x 3\"     Manual Therapy: x 25min  Soft tissue mobilization (prone with pillow under stomach): lumbar paraspinals,  glutes, thoracic paraspinals  Lumbar PA accessory joint mobs - L1-L5: Grade 2, 2 x 10\" each (limited pressure to avoid exacerbation)  Thoracic PA accessory joint mobs - T6-T12: Grade 2, 2 x 10\" each (limited pressure to avoid exacerbation)    Assessment: Margarita arrives today with reports of increased glute and back pain after adding S/L hip and glute strengthening exercise to her exercise regimen. She arrived today reporting 7-8/10 pain ratings. The patient's tolerance to exercise progressions for core and hip strengthening has been limited throughout her care, due to easy aggravation of LBP symptoms. The patient has one remaining appt at which point we will progress her to discharge to follow up with MD due to lack of significant progress in physical therapy.     Goals:   Short-Term Goals:  Patient will improve low back and hip mobility to allow for pain-free forward bending to reach for and  objects up from the floor and facilitate completion of household chores. Timeframe: 8 visits. (NOT YET MET 2/19/25. Updated goal timeframe.)  Patient will improve hip and thoracic spine rotational mobility to facilitate pain-free with normalized mobility for multi-segmental rotation movement pattern to facilitate twisting motions. Timeframe: 8 visits. (IN PROGRESS 2/19/25. The patient is with improved right hip rotational mobility, but is with remaining deficits on her left side. Updated goal timeframe.)   Long-Term Goals:  Patient will improve low back mobility and postural endurance to maintain proper posture with neutral lumbar spine and pelvic position while sitting to facilitate 2-3 hours of pain-free desk work for occupational activities. Timeframe: 12 visits. (IN PROGRESS 2/19/24. Patient is able to sit up to 2 hours before disruption due to low back pain. Updated goal timeframe.)   The patient will improve low back pain to facilitate sleeping > 4 hours for improved rest. Timeframe: 10-12 visits. (MET  2/19/25)  Patient will improve low back and hip mobility to facilitate erect standing and walking after prolonged sitting or upon waking in the morning. Timeframe: 16 visits. (NOT MET 2/19/25. Updated goal timeframe.)  Patient will improve low back pain and core endurance to facilitate walking distances of 2 miles daily to facilitate household ambulation, and community ambulation,community integration Timeframe: 20 visits. (NOT MET 2/19/25. Updated goal timeframe.)  Patient will demonstrates safe and proper lifting mechanics with lifting objects of 30lbs from a lower height to facilitate household chores and occupational activities. Timeframe: 20 visits. (NOT MET 2/19/25. Updated goal timeframe.)  Patient will improve Modified Oswestry Low Back Pain Disability Questionnaire score by 10 points or 10% to indicate a true change in improved function for ADL's and restoring PLF. Timeframe: 20 visits. (NOT MET 2/19/25. Updated goal timeframe.)    Plan: Progress to discharge next session to follow-up with MD.       Charges: MT x 2, TE x 1         Total Timed Treatment: 40min    Total Treatment Time: 40min

## 2025-03-11 ENCOUNTER — APPOINTMENT (OUTPATIENT)
Dept: GENERAL RADIOLOGY | Age: 65
End: 2025-03-11
Attending: PHYSICIAN ASSISTANT
Payer: MEDICARE

## 2025-03-11 ENCOUNTER — HOSPITAL ENCOUNTER (OUTPATIENT)
Age: 65
Discharge: HOME | End: 2025-03-11
Payer: MEDICARE

## 2025-03-11 ENCOUNTER — TELEPHONE (OUTPATIENT)
Dept: FAMILY MEDICINE CLINIC | Facility: CLINIC | Age: 65
End: 2025-03-11

## 2025-03-11 VITALS
RESPIRATION RATE: 18 BRPM | TEMPERATURE: 98 F | SYSTOLIC BLOOD PRESSURE: 224 MMHG | HEART RATE: 60 BPM | DIASTOLIC BLOOD PRESSURE: 100 MMHG | OXYGEN SATURATION: 100 %

## 2025-03-11 DIAGNOSIS — S80.02XA CONTUSION OF LEFT KNEE, INITIAL ENCOUNTER: Primary | ICD-10-CM

## 2025-03-11 DIAGNOSIS — S89.92XA INJURY OF LEFT KNEE, INITIAL ENCOUNTER: ICD-10-CM

## 2025-03-11 DIAGNOSIS — M79.605 PAIN OF LEFT LOWER EXTREMITY: ICD-10-CM

## 2025-03-11 DIAGNOSIS — R60.0 PEDAL EDEMA: ICD-10-CM

## 2025-03-11 DIAGNOSIS — S89.92XA INJURY OF LEFT LOWER LEG, INITIAL ENCOUNTER: ICD-10-CM

## 2025-03-11 PROCEDURE — 73590 X-RAY EXAM OF LOWER LEG: CPT | Performed by: PHYSICIAN ASSISTANT

## 2025-03-11 PROCEDURE — 73562 X-RAY EXAM OF KNEE 3: CPT | Performed by: PHYSICIAN ASSISTANT

## 2025-03-11 PROCEDURE — 99214 OFFICE O/P EST MOD 30 MIN: CPT | Performed by: PHYSICIAN ASSISTANT

## 2025-03-11 NOTE — TELEPHONE ENCOUNTER
Called pt. Just got back from vacation. Fell hard on left knee on first day of trip out of state. Left leg has been swollen the last week, still bruised. Bought an elastic stretch brace and wearing that, but \"something isn't right\". Notified of no appointments in office today, can be seen in urgent care today and imaging can be done if needed. Pt states she was supposed to be back to work today and was thinking of going there before calling, will go to  for evaluation.
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
## 2025-03-11 NOTE — DISCHARGE INSTRUCTIONS
Continue rest ice and elevate  Report to the emergency room as we are unable to perform a ultrasound  Close follow-up with orthopedist  Continue to monitor blood pressure at home
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
## 2025-03-11 NOTE — TELEPHONE ENCOUNTER
FELL A WEEK AGO, LEFT KNEE BRUISED & SWOLLEN, WANTS APPOINTMENT TODAY, NO OPENINGS, CALL PATIENT BACK

## 2025-03-12 ENCOUNTER — APPOINTMENT (OUTPATIENT)
Dept: PHYSICAL THERAPY | Age: 65
End: 2025-03-12
Attending: NEUROLOGICAL SURGERY
Payer: MEDICARE

## 2025-03-25 ENCOUNTER — OFFICE VISIT (OUTPATIENT)
Dept: FAMILY MEDICINE CLINIC | Facility: CLINIC | Age: 65
End: 2025-03-25
Payer: MEDICARE

## 2025-03-25 VITALS
HEART RATE: 72 BPM | BODY MASS INDEX: 38 KG/M2 | DIASTOLIC BLOOD PRESSURE: 70 MMHG | WEIGHT: 250 LBS | TEMPERATURE: 98 F | OXYGEN SATURATION: 97 % | RESPIRATION RATE: 18 BRPM | SYSTOLIC BLOOD PRESSURE: 120 MMHG

## 2025-03-25 DIAGNOSIS — M71.50 TRAUMATIC BURSITIS: Primary | ICD-10-CM

## 2025-03-25 PROCEDURE — 99213 OFFICE O/P EST LOW 20 MIN: CPT | Performed by: FAMILY MEDICINE

## 2025-03-25 RX ORDER — METHYLPREDNISOLONE 4 MG/1
TABLET ORAL
Qty: 1 EACH | Refills: 0 | Status: SHIPPED | OUTPATIENT
Start: 2025-03-25
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
## 2025-04-26 RX ORDER — ATORVASTATIN CALCIUM 40 MG/1
TABLET, FILM COATED ORAL
Qty: 90 TABLET | Refills: 0 | Status: SHIPPED | OUTPATIENT
Start: 2025-04-26

## 2025-04-29 DIAGNOSIS — E03.9 ACQUIRED HYPOTHYROIDISM: ICD-10-CM

## 2025-04-29 RX ORDER — LEVOTHYROXINE SODIUM 125 UG/1
125 TABLET ORAL
Qty: 90 TABLET | Refills: 1 | Status: SHIPPED | OUTPATIENT
Start: 2025-04-29

## 2025-04-29 NOTE — TELEPHONE ENCOUNTER
OV 03/25/25 acute  LABS 12/12/24    REFILL dispensed 02/01/25 #90    No future appointments. Tsh yearly recheck

## 2025-05-08 ENCOUNTER — TELEPHONE (OUTPATIENT)
Dept: FAMILY MEDICINE CLINIC | Facility: CLINIC | Age: 65
End: 2025-05-08

## 2025-05-08 ENCOUNTER — OFFICE VISIT (OUTPATIENT)
Dept: FAMILY MEDICINE CLINIC | Facility: CLINIC | Age: 65
End: 2025-05-08
Payer: MEDICARE

## 2025-05-08 DIAGNOSIS — I10 PRIMARY HYPERTENSION: Primary | ICD-10-CM

## 2025-05-08 RX ORDER — METOPROLOL SUCCINATE 25 MG/1
25 TABLET, EXTENDED RELEASE ORAL DAILY
Qty: 14 TABLET | Refills: 0 | Status: SHIPPED | OUTPATIENT
Start: 2025-05-08 | End: 2025-05-22
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
## 2025-05-12 VITALS
OXYGEN SATURATION: 100 % | RESPIRATION RATE: 20 BRPM | DIASTOLIC BLOOD PRESSURE: 90 MMHG | HEART RATE: 63 BPM | BODY MASS INDEX: 38 KG/M2 | HEIGHT: 68 IN | TEMPERATURE: 98 F | SYSTOLIC BLOOD PRESSURE: 150 MMHG

## 2025-05-12 LAB
ATRIAL RATE: 59 BPM
P AXIS: 14 DEGREES
P-R INTERVAL: 156 MS
Q-T INTERVAL: 432 MS
QRS DURATION: 84 MS
QTC CALCULATION (BEZET): 427 MS
R AXIS: 3 DEGREES
T AXIS: 35 DEGREES
VENTRICULAR RATE: 59 BPM

## 2025-05-14 ENCOUNTER — MED REC SCAN ONLY (OUTPATIENT)
Dept: FAMILY MEDICINE CLINIC | Facility: CLINIC | Age: 65
End: 2025-05-14
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
## 2025-07-22 ENCOUNTER — LABORATORY ENCOUNTER (OUTPATIENT)
Dept: LAB | Age: 65
End: 2025-07-22
Attending: FAMILY MEDICINE
Payer: MEDICARE

## 2025-07-22 ENCOUNTER — OFFICE VISIT (OUTPATIENT)
Dept: FAMILY MEDICINE CLINIC | Facility: CLINIC | Age: 65
End: 2025-07-22
Payer: MEDICARE

## 2025-07-22 VITALS
TEMPERATURE: 97 F | WEIGHT: 244 LBS | DIASTOLIC BLOOD PRESSURE: 70 MMHG | SYSTOLIC BLOOD PRESSURE: 116 MMHG | HEIGHT: 67.25 IN | BODY MASS INDEX: 37.85 KG/M2 | OXYGEN SATURATION: 95 % | HEART RATE: 67 BPM

## 2025-07-22 DIAGNOSIS — E78.00 HYPERCHOLESTEREMIA: ICD-10-CM

## 2025-07-22 DIAGNOSIS — Z00.00 ENCOUNTER FOR ANNUAL HEALTH EXAMINATION: ICD-10-CM

## 2025-07-22 DIAGNOSIS — G90.521 REFLEX SYMPATHETIC DYSTROPHY OF RIGHT LOWER EXTREMITY: ICD-10-CM

## 2025-07-22 DIAGNOSIS — E78.2 MIXED HYPERLIPIDEMIA: ICD-10-CM

## 2025-07-22 DIAGNOSIS — I25.10 ATHEROSCLEROSIS OF NATIVE CORONARY ARTERY OF NATIVE HEART WITHOUT ANGINA PECTORIS: ICD-10-CM

## 2025-07-22 DIAGNOSIS — E03.9 ACQUIRED HYPOTHYROIDISM: ICD-10-CM

## 2025-07-22 DIAGNOSIS — Z12.11 COLON CANCER SCREENING: ICD-10-CM

## 2025-07-22 DIAGNOSIS — Z00.00 MEDICARE ANNUAL WELLNESS VISIT, SUBSEQUENT: ICD-10-CM

## 2025-07-22 DIAGNOSIS — K21.9 GASTROESOPHAGEAL REFLUX DISEASE, UNSPECIFIED WHETHER ESOPHAGITIS PRESENT: ICD-10-CM

## 2025-07-22 DIAGNOSIS — L23.7 POISON IVY DERMATITIS: ICD-10-CM

## 2025-07-22 DIAGNOSIS — I10 PRIMARY HYPERTENSION: ICD-10-CM

## 2025-07-22 DIAGNOSIS — M48.061 LUMBAR STENOSIS WITHOUT NEUROGENIC CLAUDICATION: ICD-10-CM

## 2025-07-22 DIAGNOSIS — J45.20 MILD INTERMITTENT ASTHMA, UNSPECIFIED WHETHER COMPLICATED (HCC): ICD-10-CM

## 2025-07-22 DIAGNOSIS — Z12.31 BREAST CANCER SCREENING BY MAMMOGRAM: ICD-10-CM

## 2025-07-22 DIAGNOSIS — Z23 NEED FOR VACCINATION: Primary | ICD-10-CM

## 2025-07-22 DIAGNOSIS — Z78.0 POSTMENOPAUSAL ESTROGEN DEFICIENCY: ICD-10-CM

## 2025-07-22 DIAGNOSIS — M31.0 LEUCOCYTOCLASTIC VASCULITIS (HCC): ICD-10-CM

## 2025-07-22 DIAGNOSIS — G47.00 INSOMNIA, UNSPECIFIED TYPE: ICD-10-CM

## 2025-07-22 LAB
ALBUMIN SERPL-MCNC: 4.5 G/DL (ref 3.2–4.8)
ALBUMIN/GLOB SERPL: 1.5 {RATIO} (ref 1–2)
ALP LIVER SERPL-CCNC: 112 U/L (ref 55–142)
ALT SERPL-CCNC: 12 U/L (ref 10–49)
ANION GAP SERPL CALC-SCNC: 10 MMOL/L (ref 0–18)
AST SERPL-CCNC: 24 U/L (ref ?–34)
BASOPHILS # BLD AUTO: 0.05 X10(3) UL (ref 0–0.2)
BASOPHILS NFR BLD AUTO: 1 %
BILIRUB SERPL-MCNC: 0.7 MG/DL (ref 0.2–1.1)
BUN BLD-MCNC: 11 MG/DL (ref 9–23)
CALCIUM BLD-MCNC: 9 MG/DL (ref 8.7–10.6)
CHLORIDE SERPL-SCNC: 107 MMOL/L (ref 98–112)
CHOLEST SERPL-MCNC: 135 MG/DL (ref ?–200)
CO2 SERPL-SCNC: 25 MMOL/L (ref 21–32)
CREAT BLD-MCNC: 1.16 MG/DL (ref 0.55–1.02)
EGFRCR SERPLBLD CKD-EPI 2021: 52 ML/MIN/1.73M2 (ref 60–?)
EOSINOPHIL # BLD AUTO: 0.14 X10(3) UL (ref 0–0.7)
EOSINOPHIL NFR BLD AUTO: 2.8 %
ERYTHROCYTE [DISTWIDTH] IN BLOOD BY AUTOMATED COUNT: 13.3 %
FASTING PATIENT LIPID ANSWER: YES
FASTING STATUS PATIENT QL REPORTED: YES
GLOBULIN PLAS-MCNC: 3.1 G/DL (ref 2–3.5)
GLUCOSE BLD-MCNC: 102 MG/DL (ref 70–99)
HCT VFR BLD AUTO: 40.6 % (ref 35–48)
HDLC SERPL-MCNC: 36 MG/DL (ref 40–59)
HGB BLD-MCNC: 13.2 G/DL (ref 12–16)
IMM GRANULOCYTES # BLD AUTO: 0.01 X10(3) UL (ref 0–1)
IMM GRANULOCYTES NFR BLD: 0.2 %
LDLC SERPL CALC-MCNC: 79 MG/DL (ref ?–100)
LYMPHOCYTES # BLD AUTO: 1.45 X10(3) UL (ref 1–4)
LYMPHOCYTES NFR BLD AUTO: 28.5 %
MCH RBC QN AUTO: 28 PG (ref 26–34)
MCHC RBC AUTO-ENTMCNC: 32.5 G/DL (ref 31–37)
MCV RBC AUTO: 86 FL (ref 80–100)
MONOCYTES # BLD AUTO: 0.57 X10(3) UL (ref 0.1–1)
MONOCYTES NFR BLD AUTO: 11.2 %
NEUTROPHILS # BLD AUTO: 2.86 X10 (3) UL (ref 1.5–7.7)
NEUTROPHILS # BLD AUTO: 2.86 X10(3) UL (ref 1.5–7.7)
NEUTROPHILS NFR BLD AUTO: 56.3 %
NONHDLC SERPL-MCNC: 99 MG/DL (ref ?–130)
OSMOLALITY SERPL CALC.SUM OF ELEC: 294 MOSM/KG (ref 275–295)
PLATELET # BLD AUTO: 201 10(3)UL (ref 150–450)
POTASSIUM SERPL-SCNC: 3.9 MMOL/L (ref 3.5–5.1)
PROT SERPL-MCNC: 7.6 G/DL (ref 5.7–8.2)
RBC # BLD AUTO: 4.72 X10(6)UL (ref 3.8–5.3)
SODIUM SERPL-SCNC: 142 MMOL/L (ref 136–145)
TRIGL SERPL-MCNC: 107 MG/DL (ref 30–149)
TSI SER-ACNC: 1.03 UIU/ML (ref 0.55–4.78)
VLDLC SERPL CALC-MCNC: 17 MG/DL (ref 0–30)
WBC # BLD AUTO: 5.1 X10(3) UL (ref 4–11)

## 2025-07-22 PROCEDURE — 80061 LIPID PANEL: CPT

## 2025-07-22 PROCEDURE — 80053 COMPREHEN METABOLIC PANEL: CPT

## 2025-07-22 PROCEDURE — 36415 COLL VENOUS BLD VENIPUNCTURE: CPT

## 2025-07-22 PROCEDURE — 84443 ASSAY THYROID STIM HORMONE: CPT

## 2025-07-22 PROCEDURE — 85025 COMPLETE CBC W/AUTO DIFF WBC: CPT

## 2025-07-22 RX ORDER — TRIAMCINOLONE ACETONIDE 1 MG/G
1 CREAM TOPICAL 2 TIMES DAILY PRN
Qty: 60 G | Refills: 0 | Status: SHIPPED | OUTPATIENT
Start: 2025-07-22

## 2025-07-22 RX ORDER — IPRATROPIUM BROMIDE AND ALBUTEROL SULFATE 2.5; .5 MG/3ML; MG/3ML
3 SOLUTION RESPIRATORY (INHALATION)
Qty: 120 EACH | Refills: 0 | Status: SHIPPED | OUTPATIENT
Start: 2025-07-22

## 2025-07-22 RX ORDER — TORSEMIDE 20 MG/1
20 TABLET ORAL DAILY
Qty: 90 TABLET | Refills: 1 | Status: SHIPPED | OUTPATIENT
Start: 2025-07-22

## 2025-07-22 RX ORDER — METOPROLOL SUCCINATE 25 MG/1
25 TABLET, EXTENDED RELEASE ORAL DAILY
COMMUNITY
Start: 2025-05-28 | End: 2025-07-22

## 2025-07-22 RX ORDER — PREDNISONE 20 MG/1
20 TABLET ORAL 2 TIMES DAILY
Qty: 10 TABLET | Refills: 0 | Status: SHIPPED | OUTPATIENT
Start: 2025-07-22 | End: 2025-07-27

## 2025-07-22 RX ORDER — LEVOTHYROXINE SODIUM 125 UG/1
125 TABLET ORAL
Qty: 90 TABLET | Refills: 3 | Status: SHIPPED | OUTPATIENT
Start: 2025-07-22

## 2025-07-22 RX ORDER — FLUTICASONE PROPIONATE AND SALMETEROL XINAFOATE 230; 21 UG/1; UG/1
1 AEROSOL, METERED RESPIRATORY (INHALATION) 2 TIMES DAILY
Qty: 1 EACH | Refills: 3 | Status: SHIPPED | OUTPATIENT
Start: 2025-07-22

## 2025-07-22 RX ORDER — CARVEDILOL 12.5 MG/1
12.5 TABLET ORAL 2 TIMES DAILY WITH MEALS
Qty: 180 TABLET | Refills: 3 | Status: SHIPPED | OUTPATIENT
Start: 2025-07-22

## 2025-07-22 RX ORDER — ATORVASTATIN CALCIUM 40 MG/1
TABLET, FILM COATED ORAL
Qty: 90 TABLET | Refills: 0 | OUTPATIENT
Start: 2025-07-22

## 2025-07-22 RX ORDER — METOPROLOL SUCCINATE 25 MG/1
25 TABLET, EXTENDED RELEASE ORAL DAILY
Qty: 90 TABLET | Refills: 3 | Status: SHIPPED | OUTPATIENT
Start: 2025-07-22 | End: 2025-07-22

## 2025-07-22 RX ORDER — ATORVASTATIN CALCIUM 20 MG/1
TABLET, FILM COATED ORAL
Qty: 90 TABLET | Refills: 0 | Status: SHIPPED | OUTPATIENT
Start: 2025-07-22

## 2025-07-22 NOTE — TELEPHONE ENCOUNTER
Last refill: 04/26/25  Qty 90  W/ 0 refills  Last ov: 05/08/25    Requested Prescriptions     Pending Prescriptions Disp Refills    ATORVASTATIN 40 MG Oral Tab [Pharmacy Med Name: ATORVASTATIN 40 MG TABLET] 90 tablet 0     Sig: TAKE 1 TABLET BY MOUTH NIGHTLY WITH 20MG TABLET TO TOTAL 60MG     Future Appointments   Date Time Provider Department Center   7/22/2025  9:15 AM Ambar Tamez DO [facility] [place]

## 2025-08-04 ENCOUNTER — TELEPHONE (OUTPATIENT)
Dept: FAMILY MEDICINE CLINIC | Facility: CLINIC | Age: 65
End: 2025-08-04

## (undated) NOTE — Clinical Note
ASTHMA ACTION PLAN for Phill Jamison     : 3/29/1960          Date: 3/7/2017    [facility], [facility], [address] [place] [id number] [phone] 1.   GREEN - GO!  % Personal Best Pea a copy of the plan was given to the patient/caregiver. Asthma Action Plan reviewed with patient (and caregiver if necessary) on the phone and mailed copy to patient.          Physician Patient Caretaker (if necessary)   DO Ammon Parsons

## (undated) NOTE — LETTER
ASTHMA ACTION PLAN for Del Newell     : 3/29/1960     Date: 23  Doctor:  Jess Ochoa DO  Phone for doctor or clinic: [facility], [facility], [address] [address] [phone]      ACT Score: 16    ACT Goal: 20 or greater    Call your provider if you require your rescue/quick reliever medication more than 2-3 times in a 24 hour period. If you require your rescue inhaler/medication more than 2-3 times weekly, your asthma may not be under proper control and you should seek medical attention. *Quick Relievers are Xopenex and Albuterol*    You can use the colors of a traffic light to help learn about your asthma medicines. Year Round       1. Green - Go! % of Personal Best Peak Flow   Use controller medicine. Breathing is good  No cough or wheeze  Can work and play Medicine How much to take When to take it    Medications       Steroid Inhalants Instructions     Fluticasone Propionate  MCG/ACT Inhalation Aerosol    Inhale 2 puffs into the lungs 2 (two) times daily. Leukotriene Modulators Instructions     montelukast (SINGULAIR) 10 MG Oral Tab    Take 1 tablet (10 mg total) by mouth daily. Montelukast Sodium (SINGULAIR) 10 MG Oral Tab    Take 1 tablet (10 mg total) by mouth every evening. Patient not taking: Reported on 2022      Sympathomimetics Instructions     albuterol (2.5 MG/3ML) 0.083% Inhalation Nebu Soln    Take 3 mL (2.5 mg total) by nebulization every 4 (four) hours as needed for Wheezing. Albuterol Sulfate HFA (PROAIR HFA) 108 (90 Base) MCG/ACT Inhalation Aero Soln    Inhale 2 puffs into the lungs every 4 (four) hours as needed for Wheezing. 2. Yellow - Caution. 50-79% Personal Best Peak Flow  Use reliever medicine to keep an asthma attack from getting bad.    Cough  Quick Relievers  Wheezing  Tight Chest  Wake up at night Medicine How much to take When to take it    If symptoms are not improving in 24-48 hrs, call office for further instructions  Medications       Steroid Inhalants Instructions     Fluticasone Propionate  MCG/ACT Inhalation Aerosol    Inhale 2 puffs into the lungs 2 (two) times daily. Leukotriene Modulators Instructions     montelukast (SINGULAIR) 10 MG Oral Tab    Take 1 tablet (10 mg total) by mouth daily. Montelukast Sodium (SINGULAIR) 10 MG Oral Tab    Take 1 tablet (10 mg total) by mouth every evening. Patient not taking: Reported on 8/29/2022      Sympathomimetics Instructions     albuterol (2.5 MG/3ML) 0.083% Inhalation Nebu Soln    Take 3 mL (2.5 mg total) by nebulization every 4 (four) hours as needed for Wheezing. Albuterol Sulfate HFA (PROAIR HFA) 108 (90 Base) MCG/ACT Inhalation Aero Soln    Inhale 2 puffs into the lungs every 4 (four) hours as needed for Wheezing. 3. Red - Stop! Danger! <50% Personal Best Peak Flow  Continue Controller Medications But ADD:   Medicine not helping  Breathing is hard and fast  Nose opens wide  Can't walk  Ribs show  Can't talk well Medicine How much to take When to take it    If your symptoms do not improve in ONE hour -  go to the emergency room or call 911 immediately! If symptoms improve, call office for appointment immediately. Albuterol inhaler 2 puffs every 20 minutes for three treatments       Don't forget:  Rinse mouth after using inhaler  Use spacer for inhaler  Remember to get your Flu vaccine every fall! [x] Asthma Action Plan reviewed with the caregiver and patient, and a copy of the plan was given to the patient/caregiver. [] Asthma Action Plan reviewed with the caregiver and patient on the phone, and copy mailed to patient/caregiver or sent via [address]      Signatures:   Provider  Nina Delarosa, DO Patient  Aaron Carr

## (undated) NOTE — MR AVS SNAPSHOT
Braden Brown  [address] [facility] [address]  [phone]               Thank you for choosing us for your health care visit with Rena Gibbs 21., DO.   We are glad to serve you and happy to provide you with this summary repair surgery is a type of surgery to fix a damaged Achilles tendon. The damage may be a tear or rupture from a sudden (acute) injury. Or the damage may be from overuse, or wear and tear, or from other conditions.  This long-term (chronic) injury is also k © 0305-7706 [address], [address] [place] [address]. All rights reserved. This information is not intended as a substitute for professional medical care. Always follow your healthcare professional's instructions.              Al Take 1 tablet (25 mcg total) by mouth before breakfast.   What changed:  additional instructions   Commonly known as:  SYNTHROID, LEVOTHROID           * Levothyroxine Sodium 200 MCG Tabs   Take 1 tablet (200 mcg total) by mouth before breakfast.   What celestino Call [phone] for help. [facility] is NOT to be used for urgent needs. For medical emergencies, dial 911.            Visit [facility] online at  [email or web address]

## (undated) NOTE — LETTER
Date: 7/27/2021    Patient Name: Everette Prajapati          To Whom it may concern: This letter has been written at the patient's request. The above patient was seen at the [facility] for treatment of a medical condition.     This patient shoul

## (undated) NOTE — LETTER
Patient Name: Margarita Summers        : 3/29/1960       Medical Record #: [id number]    CONSENT FOR PROCEDURES/SEDATION    Date: 2024       Time: 2:15 PM        1. I authorize the performance upon Margarita Summers the following:    _____right greater trochanter bursitis_____________________________________________________________________    2. I authorize Dr. Tamez (and whomever is designated as the doctor’s assistant), to perform the above mentioned procedures.    3. If any unforeseen conditions arise during this procedure calling for additional procedures, operations, or medications (including anesthesia and blood transfusion), I  further request and authorize the doctor to do whatever he/she deems advisable in my interest.    4. I consent to the taking and reproduction of any photographs in the course of this procedure for professional purposes.    5. I consent to the administration of such sedation as may be considered necessary or advisable by the physician responsible for this service, with the exception of  _____________________________.    6. I have been informed by my doctor of the nature and purpose of this procedure/sedation, possible alternative methods of treatment, risk involved and possible complications.      Signature of Patient:  ___________________________    Signature of person authorized to consent for patient: Relationship to patient:  ___________________________    ___________________    Witness: ____________________     Date: ______________    Provider: ____________________     Date: ______________

## (undated) NOTE — Clinical Note
ASTHMA ACTION PLAN for Everette Prajapati     : 3/29/1960          Date: 3/21/2017    David Sha[facility], [facility], [address] [place] [id number] [phone] 1.   GREEN - GO!  % Personal Best Pe Asthma Action Plan reviewed with patient (and caregiver if necessary) on the phone and mailed copy to patient.          Physician Patient Caretaker (if necessary)   Rena Gibbs 21., DO Maggi Joseph

## (undated) NOTE — MR AVS SNAPSHOT
Braden Brown  [address] [facility] [address]  [phone]               Thank you for choosing us for your health care visit with Rena Gibbs 21., DO.   We are glad to serve you and happy to provide you with this summary Take 1 tablet by mouth 2 (two) times daily. Commonly known as:  AUGMENTIN           cefdinir 300 MG Caps   Take 1 capsule (300 mg total) by mouth 2 (two) times daily.    Commonly known as:  OMNICEF           CloNIDine HCl 0.1 MG Tabs   Take 1 tablet (0.1 Commonly known as:  ZOCOR           * Notice: This list has 4 medication(s) that are the same as other medications prescribed for you. Read the directions carefully, and ask your doctor or other care provider to review them with you.          Where to Get

## (undated) NOTE — MR AVS SNAPSHOT
Braden Brown  [address] [facility] [address] [phone]               Thank you for choosing us for your health care visit with Rena Gibbs 21., DO.   We are glad to serve you and happy to provide you with this summary Imaging:  XR CHEST AP/PA (1 VIEW) (CPT=71010)    Instructions: To schedule an appointment for your radiology test please call Kevin Hernandez Scheduling at [phone].          Reason for Today's Visit     Cough           Medical Issues Discuss What changed: You were already taking a medication with the same name, and this prescription was added. Make sure you understand how and when to take each.    Commonly known as:  TESSALON PERLES           clarithromycin 500 MG Tabs   Take 1 tablet (500 mg Commonly known as:  ZOCOR           * Notice: This list has 6 medication(s) that are the same as other medications prescribed for you. Read the directions carefully, and ask your doctor or other care provider to review them with you.          Where to Get

## (undated) NOTE — LETTER
Date & Time: 9/2/2021, 1:39 PM  Patient: Chantal Dow  Encounter Provider(s):    Yemi Landry PA-C       To Whom It May Concern:    Chauncey Corrales was seen and treated in our department on 9/2/2021. COVID-19 PCR testing performed and negative.   If y

## (undated) NOTE — LETTER
ASTHMA ACTION PLAN for Brian Barber     : 3/29/1960     Date: 2019  Provider:  Damion Shaikh DO  Phone for doctor or clinic: [address] [place] [id number] [phone]    ACT Sc

## (undated) NOTE — LETTER
09/20/19      [employer]  [address]            Dear Yoselin Quintanilla,           Your Mammogram order is active and ready to be fulfilled.  Please call our central scheduling number [phone] If you would like this test done at an [employer]

## (undated) NOTE — ED AVS SNAPSHOT
[facility] [address] [address]    Phone:  [phone]    Fax:  [phone]           Mrs. Everette Prajapati   MRN: [id number]    Department:  [facility] [place]   Date of Visit:  1/19/2017           REINALDO Take 1 tablet (8 mg total) by mouth every 12 (twelve) hours as needed for Nausea.             Where to Get Your Medications      These medications were sent to [address] [place] [address],  Place Robin Magallanes [address] [place] [address] ([address]), [phone]- a substitute for ongoing medical care. Often, one Immediate Care visit does not uncover every injury or illness.  If you have been referred to a primary care or a specialist physician for a follow-up visit, please tell this physician (or your personal docto Maame Valencia [address]) [phone]                Additional Information       We are concerned for your overall well being:    - If you are a smoker or have smoked in the last 12 months, we encourage you to explore options for SONAL RODRIGUEZ [facility]) NR[facility] ([address] [place] [address]) which includes the Dose Index Registry. PATIENT STATED HISTORY:  Diarrhea, generalized abdominal cramping, fever and nausea for four days.         FINDINGS:    LIVER:  Normal.  No enlargement, atrophy office, you can view your past visit information in [facility] by going to Visits < Visit Summaries. [facility] questions? Call [phone] for help. [facility] is NOT to be used for urgent needs. For medical emergencies, dial 911.

## (undated) NOTE — LETTER
Date & Time: 12/3/2024, 3:24 PM  Patient: Margarita Summers  Encounter Provider(s):    Pam Mistry APRN       To Whom It May Concern:    Margarita Summers was seen and treated in our department on 12/3/2024. She can return to work 12/5/24.    If you have any questions or concerns, please do not hesitate to call.        _____________________________  Physician/APC Signature

## (undated) NOTE — LETTER
Date: 5/8/2025    Patient Name: Margarita Summers          To Whom it may concern:    The above patient was seen at Located within [facility] for treatment of a medical condition.    This patient should be excused from attending work from 5/7 through 5/8.          Sincerely,    ELISEO Moran

## (undated) NOTE — LETTER
Date: 8/7/2018    Patient Name: Everette Prajapati          To Whom it may concern: This letter has been written at the patient's request. The above patient was seen at the [facility] for treatment of a medical condition.     This patient should

## (undated) NOTE — LETTER
ASTHMA ACTION PLAN for Phill Jamison     : 3/29/1960          Date: 2018    Diana G[facility], [facility], [address] [place] [address] [phone] 1.   GREEN - GO!  % [employer] and a copy of the plan was given to the patient/caregiver. [] Asthma Action Plan reviewed with patient (and caregiver if necessary) on the phone and mailed copy to patient.          Physician Patient Caretaker (if necessary)   DO Margarita Altamirano

## (undated) NOTE — LETTER
[address] [place] [address], [facility] Darrell Guillen [address]  [phone]          Date: 1/29/2019      Patient Name: Chantal Cochrancr            To Whom it may concern:     This letter has been written at the pa

## (undated) NOTE — MR AVS SNAPSHOT
Braden Mensah  [address] [facility] [address]  [phone]               Thank you for choosing us for your health care visit with Rena Gibbs 21., DO.   We are glad to serve you and happy to provide you with this summary (Approximate)    Assoc Dx:  Encounter for screening mammogram for breast cancer [Z12.31], Encounter for therapeutic drug monitoring [Z51.81]           Referral to Podiatry- Lul Tyler    Complete by:  As directed    Assoc Dx:   Mass of Achilles tendon Reason for Today's Visit     Musculoskeletal Problem           Medical Issues Discussed Today     GERD (gastroesophageal reflux disease)    Hypothyroidism    Mass of Achilles tendon        Encounter for screening mammogram for breast cancer        Hy provider. This is most often 1 pill a day on an empty stomach. Use a pillbox labeled with the days of the week. This will help you remember to take your pill each day.   · Don’t take products that contain iron and calcium or antacids within 4 hours of takin for 1 year. From that point on, you are in postmenopause. Perimenopause  In the years leading up to menopause, your ovaries make less estrogen. You release fewer eggs and your periods become less regular.   Symptoms you may have:  · Heavier or lighter daquan Commonly known as:  CATAPRES           Cyclobenzaprine HCl 10 MG Tabs   Take 1 tablet (10 mg total) by mouth 3 (three) times daily.    Commonly known as:  FLEXERIL           fluticasone-salmeterol 250-50 MCG/DOSE Aepb   Inhale 1 puff into the lungs every 12 Results of Recent Testing       MyChart     Visit MyChart  You can access your MyChart to more actively manage your health care and view more details from this visit by going to [email or web address].   If you've recently had a stay at the [facility] Don’t forget strength training with weights and resistance Set goals and track your progress   You don’t need to join a gym. Home exercises work great.  Put more priority on exercise in your life                    Visit [facility] online at

## (undated) NOTE — LETTER
06/15/21        Margarita [address]      Dear Ricki Ramirez,    [address] [facility] records indicate that you have outstanding lab work and or testing that was ordered for you and has not yet been completed:  Orders Placed This Encounter       ENRIQUE SCR

## (undated) NOTE — MR AVS SNAPSHOT
Braden Mensah  [address] [facility] [place] [address]  [phone]               Thank you for choosing us for your health care visit with Rena Gibbs 21., DO.   We are glad to serve you and happy to provide you with this summary Referral Orders      Normal Orders This Visit    DERM - EXTERNAL [220502 CPT(R)]  Order #:  [id number]                  **REFERRAL REQUEST**    Your physician has referred you to a specialist.  Your physician or the clinic staff will provide you with the ph 5. Acute recurrent maxillary sinusitis  - finish zithromax  - nasal saline  - flonase daily         Allergies as of May 23, 2017     Adhesive Tape Other (See Comments)    Blistering      Sulfa Antibiotics Rash    Biaxin [Clarithromycin]     Gnp Iodine Commonly known as:  SYNTHROID           lisinopril 10 MG Tabs   Take 1 tablet (10 mg total) by mouth 2 (two) times daily. Commonly known as:  PRINIVIL,ZESTRIL           Montelukast Sodium 10 MG Tabs   Take 1 tablet (10 mg total) by mouth every evening.

## (undated) NOTE — LETTER
Date: 10/27/2017    Patient Name: Loni Caceres          To Whom it may concern: This letter has been written at the patient's request. Sylvia Rios needs clearance that she can wear am N91 mask at work. She had an EKG  Done 6/6/2017.  It shows low voltage most

## (undated) NOTE — LETTER
Date: 12/5/2023    Patient Name: Epi Navarrete          To Whom it may concern: This letter has been written at the patient's request. The above patient was seen at the [facility] for treatment of a medical condition. This patient should be excused from attending work from 12/5/2023 through 12/6/2023.     The patient may return to work on 12/7/2023         Sincerely,          Nina Delarosa DO